# Patient Record
Sex: FEMALE | Race: BLACK OR AFRICAN AMERICAN | NOT HISPANIC OR LATINO | Employment: OTHER | URBAN - METROPOLITAN AREA
[De-identification: names, ages, dates, MRNs, and addresses within clinical notes are randomized per-mention and may not be internally consistent; named-entity substitution may affect disease eponyms.]

---

## 2017-05-02 ENCOUNTER — HISTORICAL (OUTPATIENT)
Dept: RADIOLOGY | Facility: HOSPITAL | Age: 82
End: 2017-05-02

## 2017-05-09 ENCOUNTER — HISTORICAL (OUTPATIENT)
Dept: INTERNAL MEDICINE | Facility: CLINIC | Age: 82
End: 2017-05-09

## 2017-09-11 ENCOUNTER — HISTORICAL (OUTPATIENT)
Dept: INTERNAL MEDICINE | Facility: CLINIC | Age: 82
End: 2017-09-11

## 2017-09-18 ENCOUNTER — HISTORICAL (OUTPATIENT)
Dept: RADIOLOGY | Facility: HOSPITAL | Age: 82
End: 2017-09-18

## 2017-09-29 ENCOUNTER — HISTORICAL (OUTPATIENT)
Dept: RADIOLOGY | Facility: HOSPITAL | Age: 82
End: 2017-09-29

## 2017-10-30 ENCOUNTER — HISTORICAL (OUTPATIENT)
Dept: INTERNAL MEDICINE | Facility: CLINIC | Age: 82
End: 2017-10-30

## 2017-11-10 ENCOUNTER — HISTORICAL (OUTPATIENT)
Dept: INTERNAL MEDICINE | Facility: CLINIC | Age: 82
End: 2017-11-10

## 2017-12-27 ENCOUNTER — HISTORICAL (OUTPATIENT)
Dept: LAB | Facility: HOSPITAL | Age: 82
End: 2017-12-27

## 2018-05-28 ENCOUNTER — HISTORICAL (OUTPATIENT)
Dept: INTERNAL MEDICINE | Facility: CLINIC | Age: 83
End: 2018-05-28

## 2018-07-06 ENCOUNTER — HISTORICAL (OUTPATIENT)
Dept: RADIOLOGY | Facility: HOSPITAL | Age: 83
End: 2018-07-06

## 2018-11-26 ENCOUNTER — HISTORICAL (OUTPATIENT)
Dept: INTERNAL MEDICINE | Facility: CLINIC | Age: 83
End: 2018-11-26

## 2018-12-18 ENCOUNTER — HISTORICAL (OUTPATIENT)
Dept: INTERNAL MEDICINE | Facility: CLINIC | Age: 83
End: 2018-12-18

## 2018-12-19 ENCOUNTER — HISTORICAL (OUTPATIENT)
Dept: LAB | Facility: HOSPITAL | Age: 83
End: 2018-12-19

## 2019-02-11 ENCOUNTER — HISTORICAL (OUTPATIENT)
Dept: ADMINISTRATIVE | Facility: HOSPITAL | Age: 84
End: 2019-02-11

## 2019-05-27 ENCOUNTER — HISTORICAL (OUTPATIENT)
Dept: INTERNAL MEDICINE | Facility: CLINIC | Age: 84
End: 2019-05-27

## 2019-06-07 ENCOUNTER — HISTORICAL (OUTPATIENT)
Dept: INTERNAL MEDICINE | Facility: CLINIC | Age: 84
End: 2019-06-07

## 2019-08-30 ENCOUNTER — HISTORICAL (OUTPATIENT)
Dept: RADIOLOGY | Facility: HOSPITAL | Age: 84
End: 2019-08-30

## 2019-12-02 ENCOUNTER — HISTORICAL (OUTPATIENT)
Dept: RADIOLOGY | Facility: HOSPITAL | Age: 84
End: 2019-12-02

## 2020-01-27 ENCOUNTER — HISTORICAL (OUTPATIENT)
Dept: INTERNAL MEDICINE | Facility: CLINIC | Age: 85
End: 2020-01-27

## 2020-07-29 ENCOUNTER — HISTORICAL (OUTPATIENT)
Dept: INTERNAL MEDICINE | Facility: CLINIC | Age: 85
End: 2020-07-29

## 2020-07-31 LAB — FINAL CULTURE: NO GROWTH

## 2020-08-12 ENCOUNTER — HISTORICAL (OUTPATIENT)
Dept: RADIOLOGY | Facility: HOSPITAL | Age: 85
End: 2020-08-12

## 2020-12-24 ENCOUNTER — HISTORICAL (OUTPATIENT)
Dept: INTERNAL MEDICINE | Facility: CLINIC | Age: 85
End: 2020-12-24

## 2021-01-27 ENCOUNTER — HISTORICAL (OUTPATIENT)
Dept: RADIOLOGY | Facility: HOSPITAL | Age: 86
End: 2021-01-27

## 2021-04-21 ENCOUNTER — HISTORICAL (OUTPATIENT)
Dept: INTERNAL MEDICINE | Facility: CLINIC | Age: 86
End: 2021-04-21

## 2021-05-17 ENCOUNTER — HISTORICAL (OUTPATIENT)
Dept: RADIOLOGY | Facility: HOSPITAL | Age: 86
End: 2021-05-17

## 2021-06-03 ENCOUNTER — HISTORICAL (OUTPATIENT)
Dept: CARDIOLOGY | Facility: HOSPITAL | Age: 86
End: 2021-06-03

## 2021-06-11 ENCOUNTER — HISTORICAL (OUTPATIENT)
Dept: CARDIOLOGY | Facility: HOSPITAL | Age: 86
End: 2021-06-11

## 2021-08-26 ENCOUNTER — HISTORICAL (OUTPATIENT)
Dept: INTERNAL MEDICINE | Facility: CLINIC | Age: 86
End: 2021-08-26

## 2022-01-25 ENCOUNTER — HISTORICAL (OUTPATIENT)
Dept: LAB | Facility: HOSPITAL | Age: 87
End: 2022-01-25

## 2022-04-09 ENCOUNTER — HISTORICAL (OUTPATIENT)
Dept: ADMINISTRATIVE | Facility: HOSPITAL | Age: 87
End: 2022-04-09
Payer: MEDICARE

## 2022-04-26 VITALS
DIASTOLIC BLOOD PRESSURE: 70 MMHG | BODY MASS INDEX: 34.36 KG/M2 | SYSTOLIC BLOOD PRESSURE: 160 MMHG | OXYGEN SATURATION: 99 % | HEIGHT: 64 IN | WEIGHT: 201.25 LBS

## 2022-05-06 ENCOUNTER — LAB VISIT (OUTPATIENT)
Dept: LAB | Facility: HOSPITAL | Age: 87
End: 2022-05-06
Attending: NURSE PRACTITIONER
Payer: MEDICARE

## 2022-05-06 DIAGNOSIS — E78.5 HYPERLIPIDEMIA, UNSPECIFIED HYPERLIPIDEMIA TYPE: ICD-10-CM

## 2022-05-06 DIAGNOSIS — C82.03 FOLLICULAR LYMPHOMA GRADE I OF INTRA-ABDOMINAL LYMPH NODES: ICD-10-CM

## 2022-05-06 DIAGNOSIS — C61 MALIGNANT NEOPLASM OF PROSTATE: ICD-10-CM

## 2022-05-06 DIAGNOSIS — I48.91 ATRIAL FIBRILLATION: ICD-10-CM

## 2022-05-06 DIAGNOSIS — C83.31 RETICULOSARCOMA OF LYMPH NODES OF HEAD, FACE, AND NECK: Primary | ICD-10-CM

## 2022-05-06 LAB
ALBUMIN SERPL-MCNC: 3.6 GM/DL (ref 3.4–4.8)
ALBUMIN/GLOB SERPL: 1 RATIO (ref 1.1–2)
ALP SERPL-CCNC: 63 UNIT/L (ref 40–150)
ALT SERPL-CCNC: 11 UNIT/L (ref 0–55)
APPEARANCE UR: CLEAR
AST SERPL-CCNC: 18 UNIT/L (ref 5–34)
BACTERIA #/AREA URNS AUTO: ABNORMAL /HPF
BASOPHILS # BLD AUTO: 0.05 X10(3)/MCL (ref 0–0.2)
BASOPHILS NFR BLD AUTO: 0.7 %
BILIRUB UR QL STRIP.AUTO: NEGATIVE MG/DL
BILIRUBIN DIRECT+TOT PNL SERPL-MCNC: 0.4 MG/DL (ref 0–0.5)
BILIRUBIN DIRECT+TOT PNL SERPL-MCNC: 0.5 MG/DL (ref 0–0.8)
BILIRUBIN DIRECT+TOT PNL SERPL-MCNC: 0.9 MG/DL
BUN SERPL-MCNC: 20.2 MG/DL (ref 9.8–20.1)
CALCIUM SERPL-MCNC: 9.6 MG/DL (ref 8.4–10.2)
CHLORIDE SERPL-SCNC: 105 MMOL/L (ref 98–107)
CHOLEST SERPL-MCNC: 159 MG/DL
CHOLEST/HDLC SERPL: 3 {RATIO} (ref 0–5)
CO2 SERPL-SCNC: 29 MMOL/L (ref 23–31)
COLOR UR AUTO: YELLOW
CREAT SERPL-MCNC: 0.81 MG/DL (ref 0.55–1.02)
DEPRECATED CALCIDIOL+CALCIFEROL SERPL-MC: 39.5 NG/ML (ref 30–80)
EOSINOPHIL # BLD AUTO: 0.77 X10(3)/MCL (ref 0–0.9)
EOSINOPHIL NFR BLD AUTO: 11.1 %
ERYTHROCYTE [DISTWIDTH] IN BLOOD BY AUTOMATED COUNT: 13.5 % (ref 11.5–17)
GLOBULIN SER-MCNC: 3.6 GM/DL (ref 2.4–3.5)
GLUCOSE SERPL-MCNC: 102 MG/DL (ref 82–115)
GLUCOSE UR QL STRIP.AUTO: NEGATIVE MG/DL
HCT VFR BLD AUTO: 36.6 % (ref 37–47)
HDLC SERPL-MCNC: 46 MG/DL (ref 35–60)
HGB BLD-MCNC: 11.2 GM/DL (ref 12–16)
IMM GRANULOCYTES # BLD AUTO: 0.01 X10(3)/MCL (ref 0–0.02)
IMM GRANULOCYTES NFR BLD AUTO: 0.1 % (ref 0–0.43)
KETONES UR QL STRIP.AUTO: NEGATIVE MG/DL
LDLC SERPL CALC-MCNC: 90 MG/DL (ref 50–140)
LEUKOCYTE ESTERASE UR QL STRIP.AUTO: NEGATIVE UNIT/L
LYMPHOCYTES # BLD AUTO: 2.26 X10(3)/MCL (ref 0.6–4.6)
LYMPHOCYTES NFR BLD AUTO: 32.7 %
MCH RBC QN AUTO: 29.6 PG (ref 27–31)
MCHC RBC AUTO-ENTMCNC: 30.6 MG/DL (ref 33–36)
MCV RBC AUTO: 96.8 FL (ref 80–94)
MONOCYTES # BLD AUTO: 0.58 X10(3)/MCL (ref 0.1–1.3)
MONOCYTES NFR BLD AUTO: 8.4 %
NEUTROPHILS # BLD AUTO: 3.2 X10(3)/MCL (ref 2.1–9.2)
NEUTROPHILS NFR BLD AUTO: 47 %
NITRITE UR QL STRIP.AUTO: NEGATIVE
PH UR STRIP.AUTO: 7 [PH]
PLATELET # BLD AUTO: 322 X10(3)/MCL (ref 130–400)
PMV BLD AUTO: 9.7 FL (ref 9.4–12.4)
POTASSIUM SERPL-SCNC: 3.6 MMOL/L (ref 3.5–5.1)
PROT SERPL-MCNC: 7.2 GM/DL (ref 5.8–7.6)
PROT UR QL STRIP.AUTO: NEGATIVE MG/DL
RBC # BLD AUTO: 3.78 X10(6)/MCL (ref 4.2–5.4)
RBC #/AREA URNS AUTO: ABNORMAL /HPF
RBC UR QL AUTO: ABNORMAL UNIT/L
SODIUM SERPL-SCNC: 144 MMOL/L (ref 136–145)
SP GR UR STRIP.AUTO: 1.02
SQUAMOUS #/AREA URNS AUTO: ABNORMAL /LPF
TRIGL SERPL-MCNC: 114 MG/DL (ref 37–140)
UROBILINOGEN UR STRIP-ACNC: 0.2 MG/DL
VLDLC SERPL CALC-MCNC: 23 MG/DL
WBC # SPEC AUTO: 6.9 X10(3)/MCL (ref 4.5–11.5)
WBC #/AREA URNS AUTO: ABNORMAL /HPF

## 2022-05-06 PROCEDURE — 81003 URINALYSIS AUTO W/O SCOPE: CPT

## 2022-05-06 PROCEDURE — 80061 LIPID PANEL: CPT

## 2022-05-06 PROCEDURE — 85025 COMPLETE CBC W/AUTO DIFF WBC: CPT

## 2022-05-06 PROCEDURE — 81015 MICROSCOPIC EXAM OF URINE: CPT

## 2022-05-06 PROCEDURE — 36415 COLL VENOUS BLD VENIPUNCTURE: CPT

## 2022-05-06 PROCEDURE — 80053 COMPREHEN METABOLIC PANEL: CPT

## 2022-05-06 PROCEDURE — 82306 VITAMIN D 25 HYDROXY: CPT

## 2022-05-27 ENCOUNTER — OFFICE VISIT (OUTPATIENT)
Dept: INTERNAL MEDICINE | Facility: CLINIC | Age: 87
End: 2022-05-27
Payer: MEDICARE

## 2022-05-27 VITALS
DIASTOLIC BLOOD PRESSURE: 60 MMHG | SYSTOLIC BLOOD PRESSURE: 142 MMHG | RESPIRATION RATE: 16 BRPM | HEART RATE: 50 BPM | TEMPERATURE: 98 F | WEIGHT: 204.19 LBS | BODY MASS INDEX: 35.29 KG/M2

## 2022-05-27 DIAGNOSIS — I10 HYPERTENSION, UNSPECIFIED TYPE: ICD-10-CM

## 2022-05-27 DIAGNOSIS — E66.9 OBESITY, UNSPECIFIED CLASSIFICATION, UNSPECIFIED OBESITY TYPE, UNSPECIFIED WHETHER SERIOUS COMORBIDITY PRESENT: ICD-10-CM

## 2022-05-27 DIAGNOSIS — E55.9 VITAMIN D DEFICIENCY: ICD-10-CM

## 2022-05-27 DIAGNOSIS — E78.5 HYPERLIPIDEMIA, UNSPECIFIED HYPERLIPIDEMIA TYPE: ICD-10-CM

## 2022-05-27 DIAGNOSIS — N28.89 RENAL MASS, LEFT: ICD-10-CM

## 2022-05-27 PROCEDURE — 3288F PR FALLS RISK ASSESSMENT DOCUMENTED: ICD-10-PCS | Mod: CPTII,,, | Performed by: NURSE PRACTITIONER

## 2022-05-27 PROCEDURE — 1159F PR MEDICATION LIST DOCUMENTED IN MEDICAL RECORD: ICD-10-PCS | Mod: CPTII,,, | Performed by: NURSE PRACTITIONER

## 2022-05-27 PROCEDURE — 3077F SYST BP >= 140 MM HG: CPT | Mod: CPTII,,, | Performed by: NURSE PRACTITIONER

## 2022-05-27 PROCEDURE — 1126F AMNT PAIN NOTED NONE PRSNT: CPT | Mod: CPTII,,, | Performed by: NURSE PRACTITIONER

## 2022-05-27 PROCEDURE — 1159F MED LIST DOCD IN RCRD: CPT | Mod: CPTII,,, | Performed by: NURSE PRACTITIONER

## 2022-05-27 PROCEDURE — 1126F PR PAIN SEVERITY QUANTIFIED, NO PAIN PRESENT: ICD-10-PCS | Mod: CPTII,,, | Performed by: NURSE PRACTITIONER

## 2022-05-27 PROCEDURE — 3288F FALL RISK ASSESSMENT DOCD: CPT | Mod: CPTII,,, | Performed by: NURSE PRACTITIONER

## 2022-05-27 PROCEDURE — 1101F PT FALLS ASSESS-DOCD LE1/YR: CPT | Mod: CPTII,,, | Performed by: NURSE PRACTITIONER

## 2022-05-27 PROCEDURE — 3078F PR MOST RECENT DIASTOLIC BLOOD PRESSURE < 80 MM HG: ICD-10-PCS | Mod: CPTII,,, | Performed by: NURSE PRACTITIONER

## 2022-05-27 PROCEDURE — 99214 OFFICE O/P EST MOD 30 MIN: CPT | Mod: S$PBB,,, | Performed by: NURSE PRACTITIONER

## 2022-05-27 PROCEDURE — 99214 OFFICE O/P EST MOD 30 MIN: CPT | Mod: PBBFAC | Performed by: NURSE PRACTITIONER

## 2022-05-27 PROCEDURE — 1160F RVW MEDS BY RX/DR IN RCRD: CPT | Mod: CPTII,,, | Performed by: NURSE PRACTITIONER

## 2022-05-27 PROCEDURE — 3077F PR MOST RECENT SYSTOLIC BLOOD PRESSURE >= 140 MM HG: ICD-10-PCS | Mod: CPTII,,, | Performed by: NURSE PRACTITIONER

## 2022-05-27 PROCEDURE — 1160F PR REVIEW ALL MEDS BY PRESCRIBER/CLIN PHARMACIST DOCUMENTED: ICD-10-PCS | Mod: CPTII,,, | Performed by: NURSE PRACTITIONER

## 2022-05-27 PROCEDURE — 99214 PR OFFICE/OUTPT VISIT, EST, LEVL IV, 30-39 MIN: ICD-10-PCS | Mod: S$PBB,,, | Performed by: NURSE PRACTITIONER

## 2022-05-27 PROCEDURE — 3078F DIAST BP <80 MM HG: CPT | Mod: CPTII,,, | Performed by: NURSE PRACTITIONER

## 2022-05-27 PROCEDURE — 1101F PR PT FALLS ASSESS DOC 0-1 FALLS W/OUT INJ PAST YR: ICD-10-PCS | Mod: CPTII,,, | Performed by: NURSE PRACTITIONER

## 2022-05-27 RX ORDER — AMLODIPINE BESYLATE 10 MG/1
10 TABLET ORAL
COMMUNITY
Start: 2022-01-27 | End: 2022-05-27 | Stop reason: SDUPTHER

## 2022-05-27 RX ORDER — FAMOTIDINE 20 MG/1
20 TABLET, FILM COATED ORAL
COMMUNITY
Start: 2021-09-22 | End: 2022-05-27 | Stop reason: SDUPTHER

## 2022-05-27 RX ORDER — DICLOFENAC SODIUM 50 MG/1
50 TABLET, DELAYED RELEASE ORAL 2 TIMES DAILY PRN
Qty: 45 TABLET | Refills: 0 | Status: SHIPPED | OUTPATIENT
Start: 2022-05-27 | End: 2023-01-23 | Stop reason: SDUPTHER

## 2022-05-27 RX ORDER — FAMOTIDINE 20 MG/1
20 TABLET, FILM COATED ORAL NIGHTLY PRN
Qty: 30 TABLET | Refills: 2 | Status: SHIPPED | OUTPATIENT
Start: 2022-05-27 | End: 2022-09-22 | Stop reason: SDUPTHER

## 2022-05-27 RX ORDER — SIMVASTATIN 40 MG/1
TABLET, FILM COATED ORAL
COMMUNITY
Start: 2022-05-02 | End: 2022-05-27 | Stop reason: SDUPTHER

## 2022-05-27 RX ORDER — AMLODIPINE BESYLATE 10 MG/1
10 TABLET ORAL DAILY
Qty: 90 TABLET | Refills: 1 | Status: SHIPPED | OUTPATIENT
Start: 2022-05-27 | End: 2023-01-23 | Stop reason: SDUPTHER

## 2022-05-27 RX ORDER — TRIAMTERENE AND HYDROCHLOROTHIAZIDE 37.5; 25 MG/1; MG/1
CAPSULE ORAL
COMMUNITY
Start: 2022-01-27 | End: 2022-05-27 | Stop reason: SDUPTHER

## 2022-05-27 RX ORDER — SIMVASTATIN 40 MG/1
40 TABLET, FILM COATED ORAL NIGHTLY
Qty: 90 TABLET | Refills: 1 | Status: SHIPPED | OUTPATIENT
Start: 2022-05-27 | End: 2022-09-22 | Stop reason: SDUPTHER

## 2022-05-27 RX ORDER — BENAZEPRIL HYDROCHLORIDE 40 MG/1
40 TABLET ORAL
COMMUNITY
Start: 2022-01-27 | End: 2022-05-27 | Stop reason: SDUPTHER

## 2022-05-27 RX ORDER — TRIAMTERENE AND HYDROCHLOROTHIAZIDE 37.5; 25 MG/1; MG/1
1 CAPSULE ORAL DAILY
Qty: 90 CAPSULE | Refills: 1 | Status: SHIPPED | OUTPATIENT
Start: 2022-05-27 | End: 2022-09-22 | Stop reason: SDUPTHER

## 2022-05-27 RX ORDER — BENAZEPRIL HYDROCHLORIDE 40 MG/1
40 TABLET ORAL DAILY
Qty: 90 TABLET | Refills: 1 | Status: SHIPPED | OUTPATIENT
Start: 2022-05-27 | End: 2022-09-22 | Stop reason: SDUPTHER

## 2022-05-27 NOTE — PROGRESS NOTES
Patient, Leda Gallardo (MRN #[id number]), presented with a recorded BMI of 35.29 kg/m^2 and a documented comorbidity(s):  - Hypertension  - Hyperlipidemia  to which the severe obesity is a contributing factor. This is consistent with the definition of severe obesity (BMI 35.0-39.9) with comorbidity (ICD-10 E66.01, Z68.35). The patient's severe obesity was monitored, evaluated, addressed and/or treated. This addendum to the medical record is made on 05/27/2022.

## 2022-05-27 NOTE — PROGRESS NOTES
[facility] Medicine Clinic  [facility]     Patient Name: Leda Gallardo   : 1935  MRN:[id number]     CC:  Chief Complaint   Patient presents with    Follow-up     Lab results        HPI  86 year old AAF, presents in clinic for routine lab f/u. No acute complaints.   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 Nonsmoker. On diclofenac prn for chronic david foot/toe /ankle pain. Not taking diclofenac currently. Pain to left great toe only with walking. Denies chest pain, shortness of breath at rest, cough, fever, headache, dizziness, weakness, abdominal pain, nausea, vomiting, diarrhea, constipation, edema, dysuria, depression, anxiety.  Pneumo vac UTD.  2nd COVD 19 vaccine completed 2021  She was referred to UROLOGY at [facility] for bosniak IIF or III lesion in L superior pole.  Asymptomatic. The Urologist recommended refer to [facility] for surgical eval.  She continues to refuse urology referral to [facility].  Surveillance with CT/MRI regardless every 6 months. CT ordered at last apt and was never scheduled. Will reorder; note there is shortage of contrast.  Family history of primary malignant neoplasm of breast: Mother and Sister  MMG 2019 BIRADS 1; scheduled 2021;no showed and declines MMG in future    ECHO  EF 65%  ECHO  EF 60-65%  2021:coronary angiogram  Coronary stenosis:  discussed the option of CABG versus high risk PCI versus medical management.  Started on Imdur 20 mg p.o. daily; no longer taking. Missed f/u cardio apt and advised to reschedule.      Mild aortic stenosis/mild aortic regurgitation per TTE 8.12.20  LVEF -60-65% (TTE 8..20)    Abnormal nuclear stress test  Chest pain/DONAHUE  Lexiscan stress test -Apical defect, medium size, moderate intensity, partially reversible (1.27.21)  LVEF-60-65%  (TTE 8..20)    CT abd with contrast 2021;Enhancing mass again seen involving the posterior aspect of the  superior pole of  the left kidney. While this could represent a  neoplasm, it has not changed in size or appearance when compared to  the exam from 08/30/2019.            ROS  Review of Systems   Constitutional: Negative.    HENT: Negative.    Eyes: Negative.    Respiratory: Negative.    Cardiovascular: Negative.    Gastrointestinal: Negative.    Endocrine: Negative.    Genitourinary: Negative.    Musculoskeletal: Negative.    Integumentary:  Negative.   Allergic/Immunologic: Negative.    Neurological: Negative.    Hematological: Negative.    Psychiatric/Behavioral: Negative.    All other systems reviewed and are negative.       Physical Examination:  Vitals:    05/27/22 0930   BP: (!) 142/60   Pulse: (!) 50   Resp: 16   Temp: 97.5 °F (36.4 °C)          Physical Exam  Vitals and nursing note reviewed.   Constitutional:       Appearance: Normal appearance.   HENT:      Head: Normocephalic and atraumatic.      Right Ear: Tympanic membrane, ear canal and external ear normal.      Left Ear: Tympanic membrane, ear canal and external ear normal.      Nose: Nose normal.      Mouth/Throat:      Mouth: Mucous membranes are moist.      Pharynx: Oropharynx is clear.   Eyes:      Extraocular Movements: Extraocular movements intact.      Conjunctiva/sclera: Conjunctivae normal.      Pupils: Pupils are equal, round, and reactive to light.   Cardiovascular:      Rate and Rhythm: Normal rate and regular rhythm.   Pulmonary:      Effort: Pulmonary effort is normal.      Breath sounds: Normal breath sounds.   Abdominal:      General: Abdomen is flat. Bowel sounds are normal.      Palpations: Abdomen is soft.   Musculoskeletal:         General: Normal range of motion.      Cervical back: Normal range of motion and neck supple.   Skin:     General: Skin is warm and dry.      Capillary Refill: Capillary refill takes less than 2 seconds.   Neurological:      General: No focal deficit present.      Mental Status: She is alert and oriented to person, place,  and time. Mental status is at baseline.   Psychiatric:         Mood and Affect: Mood normal.         Behavior: Behavior normal.         Thought Content: Thought content normal.         Judgment: Judgment normal.            Labs/Imaging:  Reviewed    Assessment/Plan:  1. Renal mass, left  - Ambulatory referral/consult to Urology; Future  - Urinalysis, Reflex to Urine Culture Urine, Clean Catch; Future  Re-refer to UROLOGY at [facility]. Refusing URO referral to [facility].  F/u CT abd was ordered at prior apt and not scheduled, will have staff check with scheduling.  Asymptomatic    2. Hyperlipidemia, unspecified hyperlipidemia type  - CBC Auto Differential; Future  - Comprehensive Metabolic Panel; Future  - Lipid Panel; Future  - Urinalysis, Reflex to Urine Culture Urine, Clean Catch; Future  Lab Results   Component Value Date    LDL 90.00 05/06/2022    HDL 46 05/06/2022    TRIG 114 05/06/2022       Cont RX daily; refills given. Take Omega 3 daily.   Stressed importance of dietary modifications. Follow a low cholesterol, low saturated fat diet with less that 200mg of cholesterol a day.  Avoid fried foods and high saturated fats (high saturated fats less than 7% of calories).  Add Flax Seed/Fish Oil supplements to diet. Increase dietary fiber.  Regular exercise can reduce LDL and raise HDL. Stressed importance of physical activity 5 times per week for 30 minutes per day.     3. Hypertension, unspecified type  - CBC Auto Differential; Future  - Comprehensive Metabolic Panel; Future  - Lipid Panel; Future  - Urinalysis, Reflex to Urine Culture Urine, Clean Catch; Future  Meds continued.  DASH diet: Eat more fruits, vegetables, and low fat dairy foods.  (Less than 2 grams of sodium per day).  Maintain healthy weight with goal BMI <30.   Exercise 30 minutes per day 5 days per week.  Home medications refilled and continued.   Home BP monitoring encouraged with BP parameters given.       4. Obesity, unspecified classification,  unspecified obesity type, unspecified whether serious comorbidity present  Goal BMI <30.  Exercise 5 times a week for 30 minutes per day.  Avoid soda, simple sugars, excessive rice, potatoes or bread. Limit fast foods and fried foods.  Choose complex carbs in moderation (example: green vegetables, beans, oatmeal). Eat plenty of fresh fruits and vegetables with lean meats daily.  Do not skip meals. Eat a balanced portion size.  Avoid fad diets. Consider permanent healthy life style changes.     5. Vitamin D deficiency  - Vitamin D; Future  Vit D at goal, continue otc vit d    Medication List with Changes/Refills   Changed and/or Refilled Medications    Modified Medication Previous Medication    AMLODIPINE (NORVASC) 10 MG TABLET amLODIPine (NORVASC) 10 MG tablet       Take 1 tablet (10 mg total) by mouth once daily.    Take 10 mg by mouth.    BENAZEPRIL (LOTENSIN) 40 MG TABLET benazepriL (LOTENSIN) 40 MG tablet       Take 1 tablet (40 mg total) by mouth once daily.    Take 40 mg by mouth.    DICLOFENAC (VOLTAREN) 50 MG EC TABLET diclofenac (VOLTAREN) 50 MG EC tablet       Take 1 tablet (50 mg total) by mouth 2 (two) times daily as needed (pain).    TAKE 1 TABLET BY MOUTH TWICE DAILY AS NEEDED FOR PAIN WITH FOOD    FAMOTIDINE (PEPCID) 20 MG TABLET famotidine (PEPCID) 20 MG tablet       Take 1 tablet (20 mg total) by mouth nightly as needed for Heartburn.    Take 20 mg by mouth.    SIMVASTATIN (ZOCOR) 40 MG TABLET simvastatin (ZOCOR) 40 MG tablet       Take 1 tablet (40 mg total) by mouth every evening.    TAKE 1 TABLET BY MOUTH ONCE DAILY AT BEDTIME FOR 90 DAYS    TRIAMTERENE-HYDROCHLOROTHIAZIDE 37.5-25 MG (DYAZIDE) 37.5-25 MG PER CAPSULE triamterene-hydrochlorothiazide 37.5-25 mg (DYAZIDE) 37.5-25 mg per capsule       Take 1 capsule by mouth once daily.    Take by mouth.        Labs thoroughly reviewed with patient. Medication refills addressed today.  RTC prn and 3-4 months, with labs 1 week prior to the apt.  COVID  19 precautions given to patient.  Patient voices understanding of all discharge instructions.

## 2022-07-01 ENCOUNTER — HOSPITAL ENCOUNTER (EMERGENCY)
Facility: HOSPITAL | Age: 87
Discharge: HOME | End: 2022-07-01
Attending: STUDENT IN AN ORGANIZED HEALTH CARE EDUCATION/TRAINING PROGRAM
Payer: MEDICARE

## 2022-07-01 VITALS
HEIGHT: 64 IN | TEMPERATURE: 98 F | WEIGHT: 203 LBS | OXYGEN SATURATION: 95 % | SYSTOLIC BLOOD PRESSURE: 117 MMHG | BODY MASS INDEX: 34.66 KG/M2 | RESPIRATION RATE: 18 BRPM | HEART RATE: 87 BPM | DIASTOLIC BLOOD PRESSURE: 63 MMHG

## 2022-07-01 DIAGNOSIS — B34.9 VIRAL SYNDROME: Primary | ICD-10-CM

## 2022-07-01 DIAGNOSIS — R50.9 FEVER: ICD-10-CM

## 2022-07-01 LAB
ALBUMIN SERPL-MCNC: 3.5 GM/DL (ref 3.4–4.8)
ALBUMIN/GLOB SERPL: 1.2 RATIO (ref 1.1–2)
ALP SERPL-CCNC: 72 UNIT/L (ref 40–150)
ALT SERPL-CCNC: 21 UNIT/L (ref 0–55)
APPEARANCE UR: ABNORMAL
AST SERPL-CCNC: 36 UNIT/L (ref 5–34)
BACTERIA #/AREA URNS AUTO: ABNORMAL /HPF
BASOPHILS # BLD AUTO: 0.03 X10(3)/MCL (ref 0–0.2)
BASOPHILS NFR BLD AUTO: 0.3 %
BILIRUB UR QL STRIP.AUTO: NEGATIVE MG/DL
BILIRUBIN DIRECT+TOT PNL SERPL-MCNC: 1.3 MG/DL
BUN SERPL-MCNC: 24.2 MG/DL (ref 9.8–20.1)
CALCIUM SERPL-MCNC: 9 MG/DL (ref 8.4–10.2)
CHLORIDE SERPL-SCNC: 105 MMOL/L (ref 98–107)
CO2 SERPL-SCNC: 26 MMOL/L (ref 23–31)
COLOR UR AUTO: YELLOW
CREAT SERPL-MCNC: 0.81 MG/DL (ref 0.55–1.02)
EOSINOPHIL # BLD AUTO: 0.17 X10(3)/MCL (ref 0–0.9)
EOSINOPHIL NFR BLD AUTO: 1.9 %
ERYTHROCYTE [DISTWIDTH] IN BLOOD BY AUTOMATED COUNT: 13.4 % (ref 11.5–17)
FLUAV AG UPPER RESP QL IA.RAPID: NOT DETECTED
FLUBV AG UPPER RESP QL IA.RAPID: NOT DETECTED
GLOBULIN SER-MCNC: 2.9 GM/DL (ref 2.4–3.5)
GLUCOSE SERPL-MCNC: 122 MG/DL (ref 82–115)
GLUCOSE UR QL STRIP.AUTO: NEGATIVE MG/DL
HCT VFR BLD AUTO: 34.4 % (ref 37–47)
HGB BLD-MCNC: 10.7 GM/DL (ref 12–16)
IMM GRANULOCYTES # BLD AUTO: 0.02 X10(3)/MCL (ref 0–0.04)
IMM GRANULOCYTES NFR BLD AUTO: 0.2 %
KETONES UR QL STRIP.AUTO: NEGATIVE MG/DL
LACTATE SERPL-SCNC: 2.1 MMOL/L (ref 0.5–2.2)
LEUKOCYTE ESTERASE UR QL STRIP.AUTO: NEGATIVE UNIT/L
LYMPHOCYTES # BLD AUTO: 0.47 X10(3)/MCL (ref 0.6–4.6)
LYMPHOCYTES NFR BLD AUTO: 5.4 %
MCH RBC QN AUTO: 29.5 PG (ref 27–31)
MCHC RBC AUTO-ENTMCNC: 31.1 MG/DL (ref 33–36)
MCV RBC AUTO: 94.8 FL (ref 80–94)
MONOCYTES # BLD AUTO: 0.17 X10(3)/MCL (ref 0.1–1.3)
MONOCYTES NFR BLD AUTO: 1.9 %
NEUTROPHILS # BLD AUTO: 7.9 X10(3)/MCL (ref 2.1–9.2)
NEUTROPHILS NFR BLD AUTO: 90.3 %
NITRITE UR QL STRIP.AUTO: NEGATIVE
PH UR STRIP.AUTO: 7 [PH]
PLATELET # BLD AUTO: 270 X10(3)/MCL (ref 130–400)
PMV BLD AUTO: 9.9 FL (ref 7.4–10.4)
POC CARDIAC TROPONIN I: 0 NG/ML
POCT GLUCOSE: 128 MG/DL (ref 70–110)
POTASSIUM SERPL-SCNC: 3.7 MMOL/L (ref 3.5–5.1)
PROT SERPL-MCNC: 6.4 GM/DL (ref 5.8–7.6)
PROT UR QL STRIP.AUTO: NEGATIVE MG/DL
RBC # BLD AUTO: 3.63 X10(6)/MCL (ref 4.2–5.4)
RBC #/AREA URNS AUTO: ABNORMAL /HPF
RBC UR QL AUTO: ABNORMAL UNIT/L
SAMPLE: NORMAL
SARS-COV-2 RNA RESP QL NAA+PROBE: NOT DETECTED
SODIUM SERPL-SCNC: 144 MMOL/L (ref 136–145)
SP GR UR STRIP.AUTO: 1.02
SQUAMOUS #/AREA URNS AUTO: ABNORMAL /HPF
UROBILINOGEN UR STRIP-ACNC: 1 MG/DL
WBC # SPEC AUTO: 8.8 X10(3)/MCL (ref 4.5–11.5)
WBC #/AREA URNS AUTO: ABNORMAL /HPF

## 2022-07-01 PROCEDURE — 87636 SARSCOV2 & INF A&B AMP PRB: CPT | Performed by: STUDENT IN AN ORGANIZED HEALTH CARE EDUCATION/TRAINING PROGRAM

## 2022-07-01 PROCEDURE — 81001 URINALYSIS AUTO W/SCOPE: CPT | Performed by: STUDENT IN AN ORGANIZED HEALTH CARE EDUCATION/TRAINING PROGRAM

## 2022-07-01 PROCEDURE — 99285 EMERGENCY DEPT VISIT HI MDM: CPT | Mod: 25

## 2022-07-01 PROCEDURE — 96360 HYDRATION IV INFUSION INIT: CPT

## 2022-07-01 PROCEDURE — 83605 ASSAY OF LACTIC ACID: CPT | Performed by: STUDENT IN AN ORGANIZED HEALTH CARE EDUCATION/TRAINING PROGRAM

## 2022-07-01 PROCEDURE — 80053 COMPREHEN METABOLIC PANEL: CPT | Performed by: STUDENT IN AN ORGANIZED HEALTH CARE EDUCATION/TRAINING PROGRAM

## 2022-07-01 PROCEDURE — 25000003 PHARM REV CODE 250: Performed by: STUDENT IN AN ORGANIZED HEALTH CARE EDUCATION/TRAINING PROGRAM

## 2022-07-01 PROCEDURE — 87184 SC STD DISK METHOD PER PLATE: CPT | Performed by: STUDENT IN AN ORGANIZED HEALTH CARE EDUCATION/TRAINING PROGRAM

## 2022-07-01 PROCEDURE — 81003 URINALYSIS AUTO W/O SCOPE: CPT | Performed by: STUDENT IN AN ORGANIZED HEALTH CARE EDUCATION/TRAINING PROGRAM

## 2022-07-01 PROCEDURE — 87077 CULTURE AEROBIC IDENTIFY: CPT | Performed by: STUDENT IN AN ORGANIZED HEALTH CARE EDUCATION/TRAINING PROGRAM

## 2022-07-01 PROCEDURE — 85025 COMPLETE CBC W/AUTO DIFF WBC: CPT | Performed by: STUDENT IN AN ORGANIZED HEALTH CARE EDUCATION/TRAINING PROGRAM

## 2022-07-01 PROCEDURE — 36415 COLL VENOUS BLD VENIPUNCTURE: CPT | Performed by: STUDENT IN AN ORGANIZED HEALTH CARE EDUCATION/TRAINING PROGRAM

## 2022-07-01 RX ORDER — LIDOCAINE HYDROCHLORIDE 20 MG/ML
10 SOLUTION OROPHARYNGEAL ONCE
Status: COMPLETED | OUTPATIENT
Start: 2022-07-01 | End: 2022-07-01

## 2022-07-01 RX ORDER — SODIUM CHLORIDE 9 MG/ML
1000 INJECTION, SOLUTION INTRAVENOUS
Status: COMPLETED | OUTPATIENT
Start: 2022-07-01 | End: 2022-07-01

## 2022-07-01 RX ORDER — MAG HYDROX/ALUMINUM HYD/SIMETH 200-200-20
30 SUSPENSION, ORAL (FINAL DOSE FORM) ORAL ONCE
Status: COMPLETED | OUTPATIENT
Start: 2022-07-01 | End: 2022-07-01

## 2022-07-01 RX ORDER — ACETAMINOPHEN 500 MG
1000 TABLET ORAL
Status: COMPLETED | OUTPATIENT
Start: 2022-07-01 | End: 2022-07-01

## 2022-07-01 RX ADMIN — ALUMINUM HYDROXIDE, MAGNESIUM HYDROXIDE, AND SIMETHICONE 30 ML: 200; 200; 20 SUSPENSION ORAL at 03:07

## 2022-07-01 RX ADMIN — SODIUM CHLORIDE 1000 ML: 9 INJECTION, SOLUTION INTRAVENOUS at 02:07

## 2022-07-01 RX ADMIN — ACETAMINOPHEN 1000 MG: 500 TABLET, FILM COATED ORAL at 12:07

## 2022-07-01 RX ADMIN — LIDOCAINE HYDROCHLORIDE 10 ML: 20 SOLUTION ORAL; TOPICAL at 04:07

## 2022-07-01 NOTE — DISCHARGE INSTRUCTIONS
Continue to monitor fevers at home and for any new or worsening symptoms  FU with PCP  Er precautions given

## 2022-07-01 NOTE — ED NOTES
Pt c/o shaking and chills that started this morning. Daughter at bed side states pt was sob upon arrival to her house.  Pt denies CP, or sob. No distress noted. Pt AAO x 3.

## 2022-07-01 NOTE — ED PROVIDER NOTES
Encounter Date: 7/1/2022       History     Chief Complaint   Patient presents with    Chills     Reports she started with chills this morning around 8:00 am. Denies pain or any other symptoms     87-year-old female presents to ED after EMS was called when family noted that she was having chills.  Patient's daughter states that she was shaking a lot this morning however temperature without taking.  Denies any other issues or complaints.  Denies any fevers, chills, nausea, vomiting, abdominal pain, headaches, cough, sinus condition.  Patient is very active for age cares for 2 small children who live with her.  Live with numerous other family members however denies any known sick contacts.    Patients daughter states she has chronic issues with constiaption however took some mag citrate yesterday and had soft BM this morning        Review of patient's allergies indicates:  No Known Allergies  No past medical history on file.  Past Surgical History:   Procedure Laterality Date    HYSTERECTOMY      LEFT HEART CATHETERIZATION  06/11/2021     Family History   Problem Relation Age of Onset    Breast cancer Mother     Breast cancer Sister      Social History     Tobacco Use    Smoking status: Never Smoker    Smokeless tobacco: Never Used     Review of Systems   Constitutional: Positive for chills. Negative for fever.   HENT: Negative for sore throat.    Respiratory: Negative for shortness of breath.    Cardiovascular: Negative for chest pain.   Gastrointestinal: Negative for nausea.   Genitourinary: Negative for dysuria.   Musculoskeletal: Negative for back pain.   Skin: Negative for rash.   Neurological: Negative for weakness.   Hematological: Does not bruise/bleed easily.       Physical Exam     Initial Vitals [07/01/22 1127]   BP Pulse Resp Temp SpO2   (!) 177/75 93 16 (!) 100.5 °F (38.1 °C) 98 %      MAP       --         Physical Exam    Nursing note and vitals reviewed.  Constitutional: She appears well-developed  and well-nourished. No distress.   In good spirits, laughing, talkative   HENT:   Head: Normocephalic and atraumatic.   Eyes: EOM are normal. Pupils are equal, round, and reactive to light.   Neck: Neck supple.   Normal range of motion.  Cardiovascular: Normal rate, regular rhythm, normal heart sounds and intact distal pulses.   Pulmonary/Chest: Breath sounds normal. No respiratory distress. She has no wheezes.   Abdominal: Abdomen is soft. Bowel sounds are normal. There is no abdominal tenderness. There is no rebound and no guarding.   Musculoskeletal:         General: No tenderness or edema. Normal range of motion.      Cervical back: Normal range of motion and neck supple.     Neurological: She is alert and oriented to person, place, and time. She has normal strength.   Skin: Skin is warm and dry. No erythema.   Psychiatric: She has a normal mood and affect. Her behavior is normal. Judgment and thought content normal.         ED Course   Procedures  Labs Reviewed   URINALYSIS, REFLEX TO URINE CULTURE - Abnormal; Notable for the following components:       Result Value    Appearance, UA Slightly Cloudy (*)     Blood, UA Small (*)     All other components within normal limits   COMPREHENSIVE METABOLIC PANEL - Abnormal; Notable for the following components:    Glucose Level 122 (*)     Blood Urea Nitrogen 24.2 (*)     Aspartate Aminotransferase 36 (*)     All other components within normal limits   CBC WITH DIFFERENTIAL - Abnormal; Notable for the following components:    RBC 3.63 (*)     Hgb 10.7 (*)     Hct 34.4 (*)     MCV 94.8 (*)     MCHC 31.1 (*)     Lymph # 0.47 (*)     All other components within normal limits   URINALYSIS, MICROSCOPIC - Abnormal; Notable for the following components:    Squamous Epithelial Cells, UA Many (*)     All other components within normal limits   POCT GLUCOSE - Abnormal; Notable for the following components:    POCT Glucose 128 (*)     All other components within normal limits    COVID/FLU A&B PCR - Normal   LACTIC ACID, PLASMA - Normal   BLOOD CULTURE [facility]   BLOOD CULTURE [facility]   CBC W/ AUTO DIFFERENTIAL    Narrative:     The following orders were created for panel order CBC Auto Differential.  Procedure                               Abnormality         Status                     ---------                               -----------         ------                     CBC with Differential[[id number]]        Abnormal            Final result                 Please view results for these tests on the individual orders.   POCT TROPONIN     EKG Readings: (Independently Interpreted)   Initial Reading: No STEMI. Rhythm: Normal Sinus Rhythm. Heart Rate: 81. Ectopy: No Ectopy.   nonspecific t wave abnormality       Imaging Results          CT Abdomen Pelvis  Without Contrast (Final result)  Result time 07/01/22 14:55:35    Final result by Deja Thompson MD (07/01/22 14:55:35)                 Impression:      No acute abnormality of the abdomen or pelvis.      Electronically signed by: Deja Thompson  Date:    07/01/2022  Time:    14:55             Narrative:    EXAMINATION:  CT ABDOMEN PELVIS WITHOUT CONTRAST    CLINICAL HISTORY:  fever, constipation;    TECHNIQUE:  CT imaging was performed of the abdomen and pelvis without intravenous contrast. Dose length product is 1004 mGycm. Automatic exposure control, adjustment of mA/kV or iterative reconstruction technique was used to limit radiation dose.    COMPARISON:  CT abdomen pelvis dated 05/17/2021    FINDINGS:  Assessment of the visceral organs and vasculature is limited by the lack of IV contrast.    Liver/biliary: No concerning hepatic findings. No radiodense gallstone or biliary dilatation appreciated.    Pancreas: Normal.    Spleen: Normal.    Adrenals: Normal.    Kidneys, ureters and bladder: There are bilateral renal cysts better seen on comparison CT.  The bladder is within normal limits.    Reproductive organs: The uterus has been  surgically resected.    Stomach/bowel: No evidence of bowel obstruction. Normal appendix. Colonic diverticulosis without inflammatory changes.    Lymph nodes: No pathologically enlarged lymph node identified with noncontrast technique.    Peritoneum: No ascites or free air.    Vessels: Moderate scattered vascular calcifications.    Abdominal wall: Normal.    Lung bases: No consolidation or pleural effusion.    Bones: No acute osseous findings.                               X-Ray Chest 1 View (Final result)  Result time 07/01/22 12:33:37    Final result by Shasta White MD (07/01/22 12:33:37)                 Impression:      No acute cardiopulmonary abnormality.      Electronically signed by: Shasta White  Date:    07/01/2022  Time:    12:33             Narrative:    EXAMINATION:  XR CHEST 1 VIEW    CLINICAL HISTORY:  Fever, unspecified    TECHNIQUE:  Single frontal view of the chest was performed.    COMPARISON:  08/12/2020    FINDINGS:  LINES AND TUBES: EKG/telemetry leads overlie the chest.    MEDIASTINUM AND POLINA: The cardiac silhouette is normal. Aortic atherosclerosis.    LUNGS: No lobar consolidation. No edema.    PLEURA:No pleural effusion. No pneumothorax.    BONES: No acute osseous abnormality.                              X-Rays:   Independently Interpreted Readings:   Chest X-Ray: No acute abnormalities.   Abdomen: No acute changes.     Medications   acetaminophen tablet 1,000 mg (1,000 mg Oral Given 7/1/22 1255)   0.9%  NaCl infusion (0 mLs Intravenous Stopped 7/1/22 1537)   aluminum-magnesium hydroxide-simethicone 200-200-20 mg/5 mL suspension 30 mL (30 mLs Oral Given 7/1/22 1521)     And   LIDOcaine HCl 2% oral solution 10 mL (10 mLs Oral Given 7/1/22 1630)     Medical Decision Making:   Differential Diagnosis:   Viral illness, covid, flu, UTI  ED Management:  Workup for fever currently negative, discussed results with patient and daughter at bedside- deny any open wounds, bedsores, other  sources of skin infection.  CT abdomen pelvis negative.  Patient still has her spleen, not on any steroid medications or immunosuppresants.  Inquired regarding kidney lesion on imaging noted in patient's chart- referred to UROLOGY at [facility] for bosniak IIF or III lesion in L superior pole.  Asymptomatic. The Urologist recommended refer to [facility] for surgical eval. Pt continues to refuse urology referral to [facility]. Surveillance with CT/MRI recommended every 6 months. Patient's daughter states  due to patient's age they were not going to further investigate.  Patient has not had any night sweats or weight loss or signs of malignancy.  Patient does live with multiple people in her home including small children so discussed with the patient's daughter that this could be a source of viral illness and to continue to monitor fevers for the next 24 hours as well as for any new symptoms.  patient advised to follow-up with PCP for any concerns and strict ER precaution given.All questions and concerns addressed. VSS, pt  afebrile at time of DC             ED Course as of 07/01/22 1644   Fri Jul 01, 2022   1438 Given acetaminophen 1000mg for fever [MG]      ED Course User Index  [MG] Lacy Bain MD             Clinical Impression:   Final diagnoses:  [R50.9] Fever  [B34.9] Viral syndrome (Primary)          ED Disposition Condition    Discharge Stable        ED Prescriptions     None        Follow-up Information     Follow up With Specialties Details Why Contact Info    [facility] Nurse Practitioner In 1 week As needed, If symptoms worsen [address] [facility] [id number] [phone]      [facility] - Emergency Dept Emergency Medicine  As needed [address] [facility] [id number] [phone]           Lacy Bain MD  07/01/22 1644       Lacy Bain MD  07/01/22 1640

## 2022-07-06 LAB
BACTERIA BLD CULT: ABNORMAL
BACTERIA BLD CULT: NORMAL
GRAM STN SPEC: ABNORMAL

## 2022-07-25 ENCOUNTER — OFFICE VISIT (OUTPATIENT)
Dept: UROLOGY | Facility: CLINIC | Age: 87
End: 2022-07-25
Attending: NURSE PRACTITIONER
Payer: MEDICARE

## 2022-07-25 VITALS
DIASTOLIC BLOOD PRESSURE: 82 MMHG | HEIGHT: 64 IN | RESPIRATION RATE: 18 BRPM | WEIGHT: 200.38 LBS | TEMPERATURE: 98 F | SYSTOLIC BLOOD PRESSURE: 160 MMHG | HEART RATE: 68 BPM | OXYGEN SATURATION: 97 % | BODY MASS INDEX: 34.21 KG/M2

## 2022-07-25 DIAGNOSIS — N28.89 RENAL MASS, LEFT: ICD-10-CM

## 2022-07-25 DIAGNOSIS — N28.89 OTHER SPECIFIED DISORDERS OF KIDNEY AND URETER: ICD-10-CM

## 2022-07-25 DIAGNOSIS — N28.1 BILATERAL RENAL CYSTS: Primary | ICD-10-CM

## 2022-07-25 PROCEDURE — 99214 OFFICE O/P EST MOD 30 MIN: CPT | Mod: PBBFAC

## 2022-07-25 NOTE — PROGRESS NOTES
Chief Complaint:   Chief Complaint   Patient presents with    left renal mass       HPI:   86 yo F HTN, HLD presented to the Urology Clinic to follow up on bilateral renal cysts,     She was referred to UROLOGY at [facility] for bosniak IIF or III lesion in L superior pole. Asymptomatic. Surveillance with CT/MRI regardless every 6 months.    Patient has been asymptomatic thus far. Denies fatigue, weight loss, change in appetite.       Allergies:  Review of patient's allergies indicates:  No Known Allergies    Medications:  Current Outpatient Medications   Medication Sig Dispense Refill    amLODIPine (NORVASC) 10 MG tablet Take 1 tablet (10 mg total) by mouth once daily. 90 tablet 1    benazepriL (LOTENSIN) 40 MG tablet Take 1 tablet (40 mg total) by mouth once daily. 90 tablet 1    diclofenac (VOLTAREN) 50 MG EC tablet Take 1 tablet (50 mg total) by mouth 2 (two) times daily as needed (pain). 45 tablet 0    famotidine (PEPCID) 20 MG tablet Take 1 tablet (20 mg total) by mouth nightly as needed for Heartburn. 30 tablet 2    simvastatin (ZOCOR) 40 MG tablet Take 1 tablet (40 mg total) by mouth every evening. 90 tablet 1    triamterene-hydrochlorothiazide 37.5-25 mg (DYAZIDE) 37.5-25 mg per capsule Take 1 capsule by mouth once daily. 90 capsule 1     No current facility-administered medications for this visit.       Review of Systems:  General: No fever, chills, fatigability, or weight loss.  Skin: No rashes, itching, or changes in color or texture of skin.  Chest: Denies DONAHUE, cyanosis, wheezing, cough, and sputum production.  Abdomen: Appetite fine. No weight loss. Denies diarrhea, abdominal pain, hematemesis, or blood in stool.  Musculoskeletal: No joint stiffness or swelling. Denies back pain.  : As above.  All other review of systems negative.    PMH:  Past Medical History:   Diagnosis Date    Essential (primary) hypertension     Heart disease        PSH:  Past Surgical History:   Procedure Laterality Date     HYSTERECTOMY      LEFT HEART CATHETERIZATION  06/11/2021       FamHx:  Family History   Problem Relation Age of Onset    Breast cancer Mother     Breast cancer Sister        SocHx:  Social History     Socioeconomic History    Marital status:     Number of children: 10   Occupational History    Occupation: retired   Tobacco Use    Smoking status: Never Smoker    Smokeless tobacco: Never Used       Physical Exam:  Vitals:    07/25/22 0851   BP: (!) 160/82   Pulse: 68   Resp: 18   Temp: 97.9 °F (36.6 °C)     General: A&Ox3, no apparent distress, no deformities  Neck: No masses, normal thyroid  Lungs: CTA david, no use of accessory muscles  Heart: RRR, no arrhythmias  Abdomen: Soft, NT, ND, no masses, no hernias, no hepatosplenomegaly  Lymphatic: Neck and groin nodes negative  Skin: The skin is warm and dry. No jaundice.  Ext: No c/c/e.      Labs:     Imaging:   CT abdomen pelvis w wo contrast: 1. Enhancing mass again seen involving the posterior aspect of the superior pole of the left kidney. While this could represent a neoplasm, it has not changed in size or appearance when compared to the exam from 08/30/2019. 2. Chronic findings as above.       Impression:  Bilateral kidney cyst/mass    Plan:  - Surveillance in 6 months with repeat CT abdomen/pelvis with and without contrast.   - RTC in 6 months

## 2022-09-19 ENCOUNTER — LAB VISIT (OUTPATIENT)
Dept: LAB | Facility: HOSPITAL | Age: 87
End: 2022-09-19
Attending: NURSE PRACTITIONER
Payer: MEDICARE

## 2022-09-19 DIAGNOSIS — E78.5 HYPERLIPIDEMIA, UNSPECIFIED HYPERLIPIDEMIA TYPE: ICD-10-CM

## 2022-09-19 DIAGNOSIS — N28.89 RENAL MASS, LEFT: ICD-10-CM

## 2022-09-19 DIAGNOSIS — I10 HYPERTENSION, UNSPECIFIED TYPE: ICD-10-CM

## 2022-09-19 DIAGNOSIS — E55.9 VITAMIN D DEFICIENCY: ICD-10-CM

## 2022-09-19 LAB
ALBUMIN SERPL-MCNC: 3.5 GM/DL (ref 3.4–4.8)
ALBUMIN/GLOB SERPL: 0.9 RATIO (ref 1.1–2)
ALP SERPL-CCNC: 67 UNIT/L (ref 40–150)
ALT SERPL-CCNC: 12 UNIT/L (ref 0–55)
AST SERPL-CCNC: 18 UNIT/L (ref 5–34)
BASOPHILS # BLD AUTO: 0.04 X10(3)/MCL (ref 0–0.2)
BASOPHILS NFR BLD AUTO: 0.6 %
BILIRUBIN DIRECT+TOT PNL SERPL-MCNC: 0.8 MG/DL
BUN SERPL-MCNC: 27.9 MG/DL (ref 9.8–20.1)
CALCIUM SERPL-MCNC: 9.7 MG/DL (ref 8.4–10.2)
CHLORIDE SERPL-SCNC: 105 MMOL/L (ref 98–107)
CHOLEST SERPL-MCNC: 165 MG/DL
CHOLEST/HDLC SERPL: 4 {RATIO} (ref 0–5)
CO2 SERPL-SCNC: 28 MMOL/L (ref 23–31)
CREAT SERPL-MCNC: 0.99 MG/DL (ref 0.55–1.02)
DEPRECATED CALCIDIOL+CALCIFEROL SERPL-MC: 38 NG/ML (ref 30–80)
EOSINOPHIL # BLD AUTO: 0.78 X10(3)/MCL (ref 0–0.9)
EOSINOPHIL NFR BLD AUTO: 11.5 %
ERYTHROCYTE [DISTWIDTH] IN BLOOD BY AUTOMATED COUNT: 13.5 % (ref 11.5–17)
GFR SERPLBLD CREATININE-BSD FMLA CKD-EPI: 56 MLS/MIN/1.73/M2
GLOBULIN SER-MCNC: 3.7 GM/DL (ref 2.4–3.5)
GLUCOSE SERPL-MCNC: 104 MG/DL (ref 82–115)
HCT VFR BLD AUTO: 36.2 % (ref 37–47)
HDLC SERPL-MCNC: 42 MG/DL (ref 35–60)
HGB BLD-MCNC: 11 GM/DL (ref 12–16)
IMM GRANULOCYTES # BLD AUTO: 0.01 X10(3)/MCL (ref 0–0.04)
IMM GRANULOCYTES NFR BLD AUTO: 0.1 %
LDLC SERPL CALC-MCNC: 99 MG/DL (ref 50–140)
LYMPHOCYTES # BLD AUTO: 2.33 X10(3)/MCL (ref 0.6–4.6)
LYMPHOCYTES NFR BLD AUTO: 34.2 %
MCH RBC QN AUTO: 28.6 PG (ref 27–31)
MCHC RBC AUTO-ENTMCNC: 30.4 MG/DL (ref 33–36)
MCV RBC AUTO: 94.3 FL (ref 80–94)
MONOCYTES # BLD AUTO: 0.56 X10(3)/MCL (ref 0.1–1.3)
MONOCYTES NFR BLD AUTO: 8.2 %
NEUTROPHILS # BLD AUTO: 3.1 X10(3)/MCL (ref 2.1–9.2)
NEUTROPHILS NFR BLD AUTO: 45.4 %
PLATELET # BLD AUTO: 335 X10(3)/MCL (ref 130–400)
PMV BLD AUTO: 9.5 FL (ref 7.4–10.4)
POTASSIUM SERPL-SCNC: 3.6 MMOL/L (ref 3.5–5.1)
PROT SERPL-MCNC: 7.2 GM/DL (ref 5.8–7.6)
RBC # BLD AUTO: 3.84 X10(6)/MCL (ref 4.2–5.4)
SODIUM SERPL-SCNC: 142 MMOL/L (ref 136–145)
TRIGL SERPL-MCNC: 120 MG/DL (ref 37–140)
VLDLC SERPL CALC-MCNC: 24 MG/DL
WBC # SPEC AUTO: 6.8 X10(3)/MCL (ref 4.5–11.5)

## 2022-09-19 PROCEDURE — 80061 LIPID PANEL: CPT

## 2022-09-19 PROCEDURE — 36415 COLL VENOUS BLD VENIPUNCTURE: CPT

## 2022-09-19 PROCEDURE — 85025 COMPLETE CBC W/AUTO DIFF WBC: CPT

## 2022-09-19 PROCEDURE — 82306 VITAMIN D 25 HYDROXY: CPT

## 2022-09-19 PROCEDURE — 80053 COMPREHEN METABOLIC PANEL: CPT

## 2022-09-22 ENCOUNTER — OFFICE VISIT (OUTPATIENT)
Dept: INTERNAL MEDICINE | Facility: CLINIC | Age: 87
End: 2022-09-22
Payer: MEDICARE

## 2022-09-22 VITALS
DIASTOLIC BLOOD PRESSURE: 56 MMHG | SYSTOLIC BLOOD PRESSURE: 133 MMHG | WEIGHT: 202.63 LBS | BODY MASS INDEX: 34.59 KG/M2 | HEART RATE: 60 BPM | RESPIRATION RATE: 16 BRPM | HEIGHT: 64 IN | TEMPERATURE: 98 F

## 2022-09-22 DIAGNOSIS — E66.9 OBESITY, UNSPECIFIED CLASSIFICATION, UNSPECIFIED OBESITY TYPE, UNSPECIFIED WHETHER SERIOUS COMORBIDITY PRESENT: ICD-10-CM

## 2022-09-22 DIAGNOSIS — N28.89 RENAL MASS: ICD-10-CM

## 2022-09-22 DIAGNOSIS — E55.9 VITAMIN D DEFICIENCY: ICD-10-CM

## 2022-09-22 DIAGNOSIS — E78.5 HYPERLIPIDEMIA, UNSPECIFIED HYPERLIPIDEMIA TYPE: ICD-10-CM

## 2022-09-22 DIAGNOSIS — I10 HYPERTENSION, UNSPECIFIED TYPE: ICD-10-CM

## 2022-09-22 DIAGNOSIS — R09.82 PND (POST-NASAL DRIP): ICD-10-CM

## 2022-09-22 PROCEDURE — 3078F PR MOST RECENT DIASTOLIC BLOOD PRESSURE < 80 MM HG: ICD-10-PCS | Mod: CPTII,,, | Performed by: NURSE PRACTITIONER

## 2022-09-22 PROCEDURE — 1126F PR PAIN SEVERITY QUANTIFIED, NO PAIN PRESENT: ICD-10-PCS | Mod: CPTII,,, | Performed by: NURSE PRACTITIONER

## 2022-09-22 PROCEDURE — 3075F SYST BP GE 130 - 139MM HG: CPT | Mod: CPTII,,, | Performed by: NURSE PRACTITIONER

## 2022-09-22 PROCEDURE — 1160F PR REVIEW ALL MEDS BY PRESCRIBER/CLIN PHARMACIST DOCUMENTED: ICD-10-PCS | Mod: CPTII,,, | Performed by: NURSE PRACTITIONER

## 2022-09-22 PROCEDURE — 1159F PR MEDICATION LIST DOCUMENTED IN MEDICAL RECORD: ICD-10-PCS | Mod: CPTII,,, | Performed by: NURSE PRACTITIONER

## 2022-09-22 PROCEDURE — 1126F AMNT PAIN NOTED NONE PRSNT: CPT | Mod: CPTII,,, | Performed by: NURSE PRACTITIONER

## 2022-09-22 PROCEDURE — 99214 OFFICE O/P EST MOD 30 MIN: CPT | Mod: PBBFAC | Performed by: NURSE PRACTITIONER

## 2022-09-22 PROCEDURE — 1160F RVW MEDS BY RX/DR IN RCRD: CPT | Mod: CPTII,,, | Performed by: NURSE PRACTITIONER

## 2022-09-22 PROCEDURE — 1101F PT FALLS ASSESS-DOCD LE1/YR: CPT | Mod: CPTII,,, | Performed by: NURSE PRACTITIONER

## 2022-09-22 PROCEDURE — 99214 PR OFFICE/OUTPT VISIT, EST, LEVL IV, 30-39 MIN: ICD-10-PCS | Mod: S$PBB,,, | Performed by: NURSE PRACTITIONER

## 2022-09-22 PROCEDURE — 99214 OFFICE O/P EST MOD 30 MIN: CPT | Mod: S$PBB,,, | Performed by: NURSE PRACTITIONER

## 2022-09-22 PROCEDURE — 3288F FALL RISK ASSESSMENT DOCD: CPT | Mod: CPTII,,, | Performed by: NURSE PRACTITIONER

## 2022-09-22 PROCEDURE — 1159F MED LIST DOCD IN RCRD: CPT | Mod: CPTII,,, | Performed by: NURSE PRACTITIONER

## 2022-09-22 PROCEDURE — 3075F PR MOST RECENT SYSTOLIC BLOOD PRESS GE 130-139MM HG: ICD-10-PCS | Mod: CPTII,,, | Performed by: NURSE PRACTITIONER

## 2022-09-22 PROCEDURE — 1101F PR PT FALLS ASSESS DOC 0-1 FALLS W/OUT INJ PAST YR: ICD-10-PCS | Mod: CPTII,,, | Performed by: NURSE PRACTITIONER

## 2022-09-22 PROCEDURE — 3288F PR FALLS RISK ASSESSMENT DOCUMENTED: ICD-10-PCS | Mod: CPTII,,, | Performed by: NURSE PRACTITIONER

## 2022-09-22 PROCEDURE — 3078F DIAST BP <80 MM HG: CPT | Mod: CPTII,,, | Performed by: NURSE PRACTITIONER

## 2022-09-22 RX ORDER — TRIAMTERENE AND HYDROCHLOROTHIAZIDE 37.5; 25 MG/1; MG/1
1 CAPSULE ORAL DAILY
Qty: 90 CAPSULE | Refills: 1 | Status: SHIPPED | OUTPATIENT
Start: 2022-09-22 | End: 2023-01-23 | Stop reason: SDUPTHER

## 2022-09-22 RX ORDER — SIMVASTATIN 40 MG/1
40 TABLET, FILM COATED ORAL NIGHTLY
Qty: 90 TABLET | Refills: 1 | Status: SHIPPED | OUTPATIENT
Start: 2022-09-22 | End: 2023-01-23 | Stop reason: SDUPTHER

## 2022-09-22 RX ORDER — BENAZEPRIL HYDROCHLORIDE 40 MG/1
40 TABLET ORAL DAILY
Qty: 90 TABLET | Refills: 1 | Status: SHIPPED | OUTPATIENT
Start: 2022-09-22 | End: 2023-01-23 | Stop reason: SDUPTHER

## 2022-09-22 RX ORDER — FLUTICASONE PROPIONATE 50 MCG
1 SPRAY, SUSPENSION (ML) NASAL DAILY
Qty: 16 G | Refills: 4 | Status: SHIPPED | OUTPATIENT
Start: 2022-09-22 | End: 2023-01-23

## 2022-09-22 RX ORDER — FAMOTIDINE 20 MG/1
20 TABLET, FILM COATED ORAL NIGHTLY PRN
Qty: 30 TABLET | Refills: 2 | Status: SHIPPED | OUTPATIENT
Start: 2022-09-22 | End: 2023-01-23 | Stop reason: SDUPTHER

## 2022-09-22 NOTE — PROGRESS NOTES
Internal Medicine Clinic  [facility]     Patient Name: Leda Gallardo   : 1935  MRN:[id number]     CC:  Chief Complaint   Patient presents with    Follow-up     Labs results        HPI  87 year old AAF, presents in clinic with daughter for routine lab f/u. C/o hoarseness for several days, having to clear throat often. Afebrile.  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 Nonsmoker. On diclofenac prn for chronic david foot/toe /ankle pain. Not taking diclofenac currently. Pain to left great toe only with walking. Denies chest pain, shortness of breath at rest, cough, fever, headache, dizziness, weakness, abdominal pain, nausea, vomiting, diarrhea, constipation, edema, dysuria, depression, anxiety.  Pneumo vac UTD.    Following UROLOGY at [facility] for bosniak IIF or III lesion in L superior pole.  Asymptomatic. The Urologist recommended refer to [facility] for surgical eval.  She continues to refuse urology referral to [facility].  Surveillance with CT/MRI regardless every 6 months.     Family history of primary malignant neoplasm of breast: Mother and Sister  MMG 2019 BIRADS 1; scheduled 2021;no showed and declines MMG in future     ECHO  EF 65%  ECHO  EF 60-65%  2021:coronary angiogram  Coronary stenosis:  discussed the option of CABG versus high risk PCI versus medical management.  Started on Imdur 20 mg p.o. daily; no longer taking. Missed f/u cardio apt and advised to reschedule.        Mild aortic stenosis/mild aortic regurgitation per TTE 8.12.20  LVEF -60-65% (TTE 8.12.20)     Abnormal nuclear stress test  Chest pain/DONAHUE  Lexiscan stress test -Apical defect, medium size, moderate intensity, partially reversible (1.27.21)  LVEF-60-65%  (TTE 8.12.20)     CT abd with contrast 2021;Enhancing mass again seen involving the posterior aspect of the  superior pole of the left kidney. While this could represent a  neoplasm, it has not  changed in size or appearance when compared to  the exam from 08/30/2019.           ROS  Review of Systems   Constitutional: Negative.    HENT: Negative.     Eyes: Negative.    Respiratory: Negative.     Cardiovascular: Negative.    Gastrointestinal: Negative.    Endocrine: Negative.    Genitourinary: Negative.    Musculoskeletal: Negative.    Integumentary:  Negative.   Allergic/Immunologic: Negative.    Neurological: Negative.    Hematological: Negative.    Psychiatric/Behavioral: Negative.     All other systems reviewed and are negative.     Physical Examination:  Vitals:    09/22/22 0911   BP: (!) 133/56   Pulse: 60   Resp: 16   Temp: 97.6 °F (36.4 °C)          Physical Exam  Vitals and nursing note reviewed.   Constitutional:       Appearance: Normal appearance.   HENT:      Head: Normocephalic and atraumatic.      Right Ear: Tympanic membrane, ear canal and external ear normal.      Left Ear: Tympanic membrane, ear canal and external ear normal.      Nose: Nose normal.      Mouth/Throat:      Mouth: Mucous membranes are moist.      Pharynx: Oropharynx is clear.   Eyes:      Extraocular Movements: Extraocular movements intact.      Conjunctiva/sclera: Conjunctivae normal.      Pupils: Pupils are equal, round, and reactive to light.   Cardiovascular:      Rate and Rhythm: Normal rate and regular rhythm.      Pulses: Normal pulses.      Heart sounds: Normal heart sounds.   Pulmonary:      Effort: Pulmonary effort is normal.      Breath sounds: Normal breath sounds.   Abdominal:      General: Abdomen is flat. Bowel sounds are normal.      Palpations: Abdomen is soft.   Musculoskeletal:         General: Normal range of motion.      Cervical back: Normal range of motion and neck supple.   Skin:     General: Skin is warm and dry.      Capillary Refill: Capillary refill takes less than 2 seconds.   Neurological:      General: No focal deficit present.      Mental Status: She is alert and oriented to person, place, and  time. Mental status is at baseline.   Psychiatric:         Mood and Affect: Mood normal.         Behavior: Behavior normal.         Thought Content: Thought content normal.         Judgment: Judgment normal.          Labs/Imaging:  Reviewed    EXAMINATION:  CT ABDOMEN PELVIS WITHOUT CONTRAST     CLINICAL HISTORY:  fever, constipation;     TECHNIQUE:  CT imaging was performed of the abdomen and pelvis without intravenous contrast. Dose length product is 1004 mGycm. Automatic exposure control, adjustment of mA/kV or iterative reconstruction technique was used to limit radiation dose.     COMPARISON:  CT abdomen pelvis dated 05/17/2021     FINDINGS:  Assessment of the visceral organs and vasculature is limited by the lack of IV contrast.     Liver/biliary: No concerning hepatic findings. No radiodense gallstone or biliary dilatation appreciated.     Pancreas: Normal.     Spleen: Normal.     Adrenals: Normal.     Kidneys, ureters and bladder: There are bilateral renal cysts better seen on comparison CT.  The bladder is within normal limits.     Reproductive organs: The uterus has been surgically resected.     Stomach/bowel: No evidence of bowel obstruction. Normal appendix. Colonic diverticulosis without inflammatory changes.     Lymph nodes: No pathologically enlarged lymph node identified with noncontrast technique.     Peritoneum: No ascites or free air.     Vessels: Moderate scattered vascular calcifications.     Abdominal wall: Normal.     Lung bases: No consolidation or pleural effusion.     Bones: No acute osseous findings.     Impression:     No acute abnormality of the abdomen or pelvis.        Electronically signed by: Deja Thompson  Date:                                            07/01/2022    Assessment/Plan:  1. Hyperlipidemia, unspecified hyperlipidemia type  - CBC Auto Differential; Future  - Comprehensive Metabolic Panel; Future  - Lipid Panel; Future  Lab Results   Component Value Date    LDL 99.00  09/19/2022    HDL 42 09/19/2022    TRIG 120 09/19/2022       Cont RX daily; refills given. Take Omega 3 daily.   Stressed importance of dietary modifications. Follow a low cholesterol, low saturated fat diet with less that 200mg of cholesterol a day.  Avoid fried foods and high saturated fats (high saturated fats less than 7% of calories).  Add Flax Seed/Fish Oil supplements to diet. Increase dietary fiber.  Regular exercise can reduce LDL and raise HDL. Stressed importance of physical activity 5 times per week for 30 minutes per day.      2. Hypertension, unspecified type  - CBC Auto Differential; Future  - Comprehensive Metabolic Panel; Future  - Lipid Panel; Future    BP stable. Med refills.  DASH diet: Eat more fruits, vegetables, and low fat dairy foods.  (Less than 2 grams of sodium per day).  Maintain healthy weight with goal BMI <30.   Exercise 30 minutes per day 5 days per week.  Home medications refilled and continued.   Home BP monitoring encouraged with BP parameters given.    Sodium Level   Date Value Ref Range Status   09/19/2022 142 136 - 145 mmol/L Final     Potassium Level   Date Value Ref Range Status   09/19/2022 3.6 3.5 - 5.1 mmol/L Final     Blood Urea Nitrogen   Date Value Ref Range Status   09/19/2022 27.9 (H) 9.8 - 20.1 mg/dL Final     Creatinine   Date Value Ref Range Status   09/19/2022 0.99 0.55 - 1.02 mg/dL Final       3. Renal mass  Following UROLOGY, CT every 6 months    4. PND (post-nasal drip)  Flonase started  Notify clinic if no improvement or change in symptoms    5. Obesity, unspecified classification, unspecified obesity type, unspecified whether serious comorbidity present  Goal BMI <30.  Exercise 5 times a week for 30 minutes per day.  Avoid soda, simple sugars, excessive rice, potatoes or bread. Limit fast foods and fried foods.  Choose complex carbs in moderation (example: green vegetables, beans, oatmeal). Eat plenty of fresh fruits and vegetables with lean meats daily.  Do  not skip meals. Eat a balanced portion size.  Avoid fad diets. Consider permanent healthy life style changes.     6. Vitamin D deficiency  - Vitamin D; Future   VIT D at goal. Take otc vit d once a day.    Medication List with Changes/Refills   New Medications    FLUTICASONE PROPIONATE (FLONASE) 50 MCG/ACTUATION NASAL SPRAY    1 spray (50 mcg total) by Each Nostril route once daily.   Current Medications    AMLODIPINE (NORVASC) 10 MG TABLET    Take 1 tablet (10 mg total) by mouth once daily.    DICLOFENAC (VOLTAREN) 50 MG EC TABLET    Take 1 tablet (50 mg total) by mouth 2 (two) times daily as needed (pain).   Changed and/or Refilled Medications    Modified Medication Previous Medication    BENAZEPRIL (LOTENSIN) 40 MG TABLET benazepriL (LOTENSIN) 40 MG tablet       Take 1 tablet (40 mg total) by mouth once daily.    Take 1 tablet (40 mg total) by mouth once daily.    FAMOTIDINE (PEPCID) 20 MG TABLET famotidine (PEPCID) 20 MG tablet       Take 1 tablet (20 mg total) by mouth nightly as needed for Heartburn.    Take 1 tablet (20 mg total) by mouth nightly as needed for Heartburn.    SIMVASTATIN (ZOCOR) 40 MG TABLET simvastatin (ZOCOR) 40 MG tablet       Take 1 tablet (40 mg total) by mouth every evening.    Take 1 tablet (40 mg total) by mouth every evening.    TRIAMTERENE-HYDROCHLOROTHIAZIDE 37.5-25 MG (DYAZIDE) 37.5-25 MG PER CAPSULE triamterene-hydrochlorothiazide 37.5-25 mg (DYAZIDE) 37.5-25 mg per capsule       Take 1 capsule by mouth once daily.    Take 1 capsule by mouth once daily.        Orders Placed This Encounter   Procedures    CBC Auto Differential    Comprehensive Metabolic Panel    Vitamin D    Lipid Panel           Future Appointments   Date Time Provider Department Center   1/23/2023  8:15 AM [facility] Albarado [facility]   1/30/2023 10:00 AM Blaire Mendez DO [facility] ALFRED [facility]         Labs thoroughly reviewed with patient. Medication refills addressed today.  RTC prn and 4  months, with labs 1 week prior to the apt.  COVID 19 precautions given to patient.  Patient voices understanding of all discharge instructions.

## 2023-01-11 ENCOUNTER — LAB VISIT (OUTPATIENT)
Dept: LAB | Facility: HOSPITAL | Age: 88
End: 2023-01-11
Attending: NURSE PRACTITIONER
Payer: MEDICARE

## 2023-01-11 DIAGNOSIS — I10 HYPERTENSION, UNSPECIFIED TYPE: ICD-10-CM

## 2023-01-11 DIAGNOSIS — E78.5 HYPERLIPIDEMIA, UNSPECIFIED HYPERLIPIDEMIA TYPE: ICD-10-CM

## 2023-01-11 DIAGNOSIS — E55.9 VITAMIN D DEFICIENCY: ICD-10-CM

## 2023-01-11 LAB
ALBUMIN SERPL-MCNC: 3.7 G/DL (ref 3.4–4.8)
ALBUMIN/GLOB SERPL: 1.2 RATIO (ref 1.1–2)
ALP SERPL-CCNC: 64 UNIT/L (ref 40–150)
ALT SERPL-CCNC: 15 UNIT/L (ref 0–55)
AST SERPL-CCNC: 18 UNIT/L (ref 5–34)
BASOPHILS # BLD AUTO: 0.03 X10(3)/MCL (ref 0–0.2)
BASOPHILS NFR BLD AUTO: 0.4 %
BILIRUBIN DIRECT+TOT PNL SERPL-MCNC: 0.8 MG/DL
BUN SERPL-MCNC: 29.4 MG/DL (ref 9.8–20.1)
CALCIUM SERPL-MCNC: 9.2 MG/DL (ref 8.4–10.2)
CHLORIDE SERPL-SCNC: 102 MMOL/L (ref 98–107)
CHOLEST SERPL-MCNC: 143 MG/DL
CHOLEST/HDLC SERPL: 3 {RATIO} (ref 0–5)
CO2 SERPL-SCNC: 29 MMOL/L (ref 23–31)
CREAT SERPL-MCNC: 0.86 MG/DL (ref 0.55–1.02)
DEPRECATED CALCIDIOL+CALCIFEROL SERPL-MC: 42.2 NG/ML (ref 30–80)
EOSINOPHIL # BLD AUTO: 0.81 X10(3)/MCL (ref 0–0.9)
EOSINOPHIL NFR BLD AUTO: 11.1 %
ERYTHROCYTE [DISTWIDTH] IN BLOOD BY AUTOMATED COUNT: 13.3 % (ref 11.5–17)
GFR SERPLBLD CREATININE-BSD FMLA CKD-EPI: >60 MLS/MIN/1.73/M2
GLOBULIN SER-MCNC: 3.2 GM/DL (ref 2.4–3.5)
GLUCOSE SERPL-MCNC: 107 MG/DL (ref 82–115)
HCT VFR BLD AUTO: 35.8 % (ref 37–47)
HDLC SERPL-MCNC: 46 MG/DL (ref 35–60)
HGB BLD-MCNC: 10.7 GM/DL (ref 12–16)
IMM GRANULOCYTES # BLD AUTO: 0.01 X10(3)/MCL (ref 0–0.04)
IMM GRANULOCYTES NFR BLD AUTO: 0.1 %
LDLC SERPL CALC-MCNC: 78 MG/DL (ref 50–140)
LYMPHOCYTES # BLD AUTO: 2.71 X10(3)/MCL (ref 0.6–4.6)
LYMPHOCYTES NFR BLD AUTO: 37.1 %
MCH RBC QN AUTO: 28.3 PG
MCHC RBC AUTO-ENTMCNC: 29.9 MG/DL (ref 33–36)
MCV RBC AUTO: 94.7 FL (ref 80–94)
MONOCYTES # BLD AUTO: 0.65 X10(3)/MCL (ref 0.1–1.3)
MONOCYTES NFR BLD AUTO: 8.9 %
NEUTROPHILS # BLD AUTO: 3.1 X10(3)/MCL (ref 2.1–9.2)
NEUTROPHILS NFR BLD AUTO: 42.4 %
PLATELET # BLD AUTO: 328 X10(3)/MCL (ref 130–400)
PMV BLD AUTO: 9.4 FL (ref 7.4–10.4)
POTASSIUM SERPL-SCNC: 4 MMOL/L (ref 3.5–5.1)
PROT SERPL-MCNC: 6.9 GM/DL (ref 5.8–7.6)
RBC # BLD AUTO: 3.78 X10(6)/MCL (ref 4.2–5.4)
SODIUM SERPL-SCNC: 140 MMOL/L (ref 136–145)
TRIGL SERPL-MCNC: 96 MG/DL (ref 37–140)
VLDLC SERPL CALC-MCNC: 19 MG/DL
WBC # SPEC AUTO: 7.3 X10(3)/MCL (ref 4.5–11.5)

## 2023-01-11 PROCEDURE — 36415 COLL VENOUS BLD VENIPUNCTURE: CPT

## 2023-01-11 PROCEDURE — 80061 LIPID PANEL: CPT

## 2023-01-11 PROCEDURE — 82306 VITAMIN D 25 HYDROXY: CPT

## 2023-01-11 PROCEDURE — 85025 COMPLETE CBC W/AUTO DIFF WBC: CPT

## 2023-01-11 PROCEDURE — 80053 COMPREHEN METABOLIC PANEL: CPT

## 2023-01-23 ENCOUNTER — OFFICE VISIT (OUTPATIENT)
Dept: INTERNAL MEDICINE | Facility: CLINIC | Age: 88
End: 2023-01-23
Payer: MEDICARE

## 2023-01-23 VITALS
HEIGHT: 64 IN | SYSTOLIC BLOOD PRESSURE: 129 MMHG | BODY MASS INDEX: 35.34 KG/M2 | HEART RATE: 60 BPM | DIASTOLIC BLOOD PRESSURE: 62 MMHG | WEIGHT: 207 LBS | TEMPERATURE: 98 F | RESPIRATION RATE: 16 BRPM

## 2023-01-23 DIAGNOSIS — Z23 NEED FOR INFLUENZA VACCINATION: ICD-10-CM

## 2023-01-23 DIAGNOSIS — E66.9 OBESITY, UNSPECIFIED CLASSIFICATION, UNSPECIFIED OBESITY TYPE, UNSPECIFIED WHETHER SERIOUS COMORBIDITY PRESENT: ICD-10-CM

## 2023-01-23 DIAGNOSIS — N28.89 RENAL MASS: ICD-10-CM

## 2023-01-23 DIAGNOSIS — M25.572 CHRONIC PAIN OF LEFT ANKLE: ICD-10-CM

## 2023-01-23 DIAGNOSIS — I10 HYPERTENSION, UNSPECIFIED TYPE: ICD-10-CM

## 2023-01-23 DIAGNOSIS — Z78.0 OSTEOPENIA AFTER MENOPAUSE: ICD-10-CM

## 2023-01-23 DIAGNOSIS — E78.5 HYPERLIPIDEMIA, UNSPECIFIED HYPERLIPIDEMIA TYPE: ICD-10-CM

## 2023-01-23 DIAGNOSIS — E55.9 VITAMIN D DEFICIENCY: ICD-10-CM

## 2023-01-23 DIAGNOSIS — M85.80 OSTEOPENIA AFTER MENOPAUSE: ICD-10-CM

## 2023-01-23 DIAGNOSIS — G89.29 CHRONIC PAIN OF LEFT ANKLE: ICD-10-CM

## 2023-01-23 PROCEDURE — 99214 OFFICE O/P EST MOD 30 MIN: CPT | Mod: PBBFAC,25 | Performed by: NURSE PRACTITIONER

## 2023-01-23 PROCEDURE — 1159F PR MEDICATION LIST DOCUMENTED IN MEDICAL RECORD: ICD-10-PCS | Mod: CPTII,,, | Performed by: NURSE PRACTITIONER

## 2023-01-23 PROCEDURE — 1126F AMNT PAIN NOTED NONE PRSNT: CPT | Mod: CPTII,,, | Performed by: NURSE PRACTITIONER

## 2023-01-23 PROCEDURE — 1126F PR PAIN SEVERITY QUANTIFIED, NO PAIN PRESENT: ICD-10-PCS | Mod: CPTII,,, | Performed by: NURSE PRACTITIONER

## 2023-01-23 PROCEDURE — 3288F PR FALLS RISK ASSESSMENT DOCUMENTED: ICD-10-PCS | Mod: CPTII,,, | Performed by: NURSE PRACTITIONER

## 2023-01-23 PROCEDURE — 1159F MED LIST DOCD IN RCRD: CPT | Mod: CPTII,,, | Performed by: NURSE PRACTITIONER

## 2023-01-23 PROCEDURE — 1101F PT FALLS ASSESS-DOCD LE1/YR: CPT | Mod: CPTII,,, | Performed by: NURSE PRACTITIONER

## 2023-01-23 PROCEDURE — 3288F FALL RISK ASSESSMENT DOCD: CPT | Mod: CPTII,,, | Performed by: NURSE PRACTITIONER

## 2023-01-23 PROCEDURE — 3078F DIAST BP <80 MM HG: CPT | Mod: CPTII,,, | Performed by: NURSE PRACTITIONER

## 2023-01-23 PROCEDURE — 90694 VACC AIIV4 NO PRSRV 0.5ML IM: CPT | Mod: PBBFAC

## 2023-01-23 PROCEDURE — 1160F RVW MEDS BY RX/DR IN RCRD: CPT | Mod: CPTII,,, | Performed by: NURSE PRACTITIONER

## 2023-01-23 PROCEDURE — 3074F SYST BP LT 130 MM HG: CPT | Mod: CPTII,,, | Performed by: NURSE PRACTITIONER

## 2023-01-23 PROCEDURE — 1160F PR REVIEW ALL MEDS BY PRESCRIBER/CLIN PHARMACIST DOCUMENTED: ICD-10-PCS | Mod: CPTII,,, | Performed by: NURSE PRACTITIONER

## 2023-01-23 PROCEDURE — 99215 PR OFFICE/OUTPT VISIT, EST, LEVL V, 40-54 MIN: ICD-10-PCS | Mod: S$PBB,,, | Performed by: NURSE PRACTITIONER

## 2023-01-23 PROCEDURE — 1101F PR PT FALLS ASSESS DOC 0-1 FALLS W/OUT INJ PAST YR: ICD-10-PCS | Mod: CPTII,,, | Performed by: NURSE PRACTITIONER

## 2023-01-23 PROCEDURE — 99215 OFFICE O/P EST HI 40 MIN: CPT | Mod: S$PBB,,, | Performed by: NURSE PRACTITIONER

## 2023-01-23 PROCEDURE — 3074F PR MOST RECENT SYSTOLIC BLOOD PRESSURE < 130 MM HG: ICD-10-PCS | Mod: CPTII,,, | Performed by: NURSE PRACTITIONER

## 2023-01-23 PROCEDURE — 3078F PR MOST RECENT DIASTOLIC BLOOD PRESSURE < 80 MM HG: ICD-10-PCS | Mod: CPTII,,, | Performed by: NURSE PRACTITIONER

## 2023-01-23 PROCEDURE — G0008 ADMIN INFLUENZA VIRUS VAC: HCPCS | Mod: PBBFAC

## 2023-01-23 RX ORDER — TRIAMTERENE AND HYDROCHLOROTHIAZIDE 37.5; 25 MG/1; MG/1
1 CAPSULE ORAL DAILY
Qty: 90 CAPSULE | Refills: 1 | Status: SHIPPED | OUTPATIENT
Start: 2023-01-23 | End: 2023-07-26 | Stop reason: SDUPTHER

## 2023-01-23 RX ORDER — DICLOFENAC SODIUM 50 MG/1
50 TABLET, DELAYED RELEASE ORAL 2 TIMES DAILY PRN
Qty: 45 TABLET | Refills: 0 | Status: SHIPPED | OUTPATIENT
Start: 2023-01-23 | End: 2023-07-26 | Stop reason: SDUPTHER

## 2023-01-23 RX ORDER — AMLODIPINE BESYLATE 10 MG/1
10 TABLET ORAL DAILY
Qty: 90 TABLET | Refills: 1 | Status: SHIPPED | OUTPATIENT
Start: 2023-01-23 | End: 2023-07-26 | Stop reason: SDUPTHER

## 2023-01-23 RX ORDER — FAMOTIDINE 20 MG/1
20 TABLET, FILM COATED ORAL NIGHTLY PRN
Qty: 30 TABLET | Refills: 2 | Status: SHIPPED | OUTPATIENT
Start: 2023-01-23 | End: 2024-01-31 | Stop reason: SDUPTHER

## 2023-01-23 RX ORDER — DICLOFENAC SODIUM 10 MG/G
2 GEL TOPICAL 3 TIMES DAILY PRN
Qty: 100 G | Refills: 1 | Status: SHIPPED | OUTPATIENT
Start: 2023-01-23 | End: 2023-07-26 | Stop reason: SDUPTHER

## 2023-01-23 RX ORDER — SIMVASTATIN 40 MG/1
40 TABLET, FILM COATED ORAL NIGHTLY
Qty: 90 TABLET | Refills: 1 | Status: SHIPPED | OUTPATIENT
Start: 2023-01-23 | End: 2023-07-26 | Stop reason: SDUPTHER

## 2023-01-23 RX ORDER — BENAZEPRIL HYDROCHLORIDE 40 MG/1
40 TABLET ORAL DAILY
Qty: 90 TABLET | Refills: 1 | Status: SHIPPED | OUTPATIENT
Start: 2023-01-23 | End: 2023-07-26 | Stop reason: SDUPTHER

## 2023-01-23 RX ADMIN — INFLUENZA A VIRUS A/VICTORIA/2570/2019 IVR-215 (H1N1) ANTIGEN (FORMALDEHYDE INACTIVATED), INFLUENZA A VIRUS A/DARWIN/6/2021 IVR-227 (H3N2) ANTIGEN (FORMALDEHYDE INACTIVATED), INFLUENZA B VIRUS B/AUSTRIA/1359417/2021 BVR-26 ANTIGEN (FORMALDEHYDE INACTIVATED), INFLUENZA B VIRUS B/PHUKET/3073/2013 BVR-1B ANTIGEN (FORMALDEHYDE INACTIVATED) 0.5 ML: 15; 15; 15; 15 INJECTION, SUSPENSION INTRAMUSCULAR at 08:01

## 2023-01-23 NOTE — PROGRESS NOTES
Patient, Leda Gallardo (MRN #[id number]), presented with a recorded BMI of 35.53 kg/m^2 and a documented comorbidity(s):  - Hypertension  - Hyperlipidemia  to which the severe obesity is a contributing factor. This is consistent with the definition of severe obesity (BMI 35.0-39.9) with comorbidity (ICD-10 E66.01, Z68.35). The patient's severe obesity was monitored, evaluated, addressed and/or treated. This addendum to the medical record is made on 01/23/2023.

## 2023-01-23 NOTE — PROGRESS NOTES
Internal Medicine Clinic  [facility]     Patient Name: Leda Gallardo   : 1935  MRN:[id number]     CC:  Chief Complaint   Patient presents with    Follow-up     Lab results        HPI  87 year old AAF, presents in clinic with granddaughter for routine lab f/u. Reports left ankle pain, chronic without swelling. 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 Nonsmoker. On diclofenac prn for chronic david foot/toe /ankle pain. Not taking diclofenac currently. Pain to left great toe only with walking. Denies chest pain, shortness of breath at rest, cough, fever, headache, dizziness, weakness, abdominal pain, nausea, vomiting, diarrhea, constipation, edema, dysuria, depression, anxiety.  Pneumo vac UTD.    Following UROLOGY at [facility] for bosniak IIF or III lesion in L superior pole.  Asymptomatic. The Urologist recommended refer to [facility] for surgical eval.  She continues to refuse urology referral to [facility].  Surveillance with CT/MRI regardless every 6 months.      Family history of primary malignant neoplasm of breast: Mother and Sister  MMG 2019 BIRADS 1; scheduled 2021;no showed and declines MMG in future     ECHO  EF 65%  ECHO  EF 60-65%  2021:coronary angiogram  Coronary stenosis:  discussed the option of CABG versus high risk PCI versus medical management.  Started on Imdur 20 mg p.o. daily; no longer taking. Missed f/u cardio apt and advised to reschedule.        Mild aortic stenosis/mild aortic regurgitation per TTE 8.12.20  LVEF -60-65% (TTE 8.12.20)     Abnormal nuclear stress test  Chest pain/DONAHUE  Lexiscan stress test -Apical defect, medium size, moderate intensity, partially reversible (1..21)  LVEF-60-65%  (TTE 8.12.20)     CT abd with contrast 2021;Enhancing mass again seen involving the posterior aspect of the  superior pole of the left kidney. While this could represent a  neoplasm, it has not changed in size or  appearance when compared to  the exam from 08/30/2019.        ROS  Review of Systems   Constitutional: Negative.    HENT: Negative.     Eyes: Negative.    Respiratory: Negative.     Cardiovascular: Negative.    Gastrointestinal: Negative.    Endocrine: Negative.    Genitourinary: Negative.    Musculoskeletal: Negative.    Integumentary:  Negative.   Allergic/Immunologic: Negative.    Neurological: Negative.    Hematological: Negative.    Psychiatric/Behavioral: Negative.     All other systems reviewed and are negative.     Physical Examination:  Vitals:    01/23/23 0826   BP: 129/62   Pulse: 60   Resp: 16   Temp: 97.7 °F (36.5 °C)          Physical Exam  Vitals and nursing note reviewed.   Constitutional:       Appearance: Normal appearance.   HENT:      Head: Normocephalic and atraumatic.      Right Ear: Tympanic membrane, ear canal and external ear normal.      Left Ear: Tympanic membrane, ear canal and external ear normal.      Nose: Nose normal.      Mouth/Throat:      Mouth: Mucous membranes are moist.      Pharynx: Oropharynx is clear.   Eyes:      Extraocular Movements: Extraocular movements intact.      Conjunctiva/sclera: Conjunctivae normal.      Pupils: Pupils are equal, round, and reactive to light.   Cardiovascular:      Rate and Rhythm: Normal rate and regular rhythm.      Pulses: Normal pulses.      Heart sounds: Normal heart sounds.   Pulmonary:      Effort: Pulmonary effort is normal.      Breath sounds: Normal breath sounds.   Abdominal:      General: Abdomen is flat. Bowel sounds are normal.      Palpations: Abdomen is soft.   Musculoskeletal:         General: Normal range of motion.      Cervical back: Normal range of motion and neck supple.   Skin:     General: Skin is warm and dry.      Capillary Refill: Capillary refill takes less than 2 seconds.   Neurological:      General: No focal deficit present.      Mental Status: She is alert and oriented to person, place, and time. Mental status is at  baseline.   Psychiatric:         Mood and Affect: Mood normal.         Behavior: Behavior normal.         Thought Content: Thought content normal.         Judgment: Judgment normal.          Labs/Imaging:    Chemistry:  Lab Results   Component Value Date     01/11/2023    K 4.0 01/11/2023    CHLORIDE 102 01/11/2023    BUN 29.4 (H) 01/11/2023    CREATININE 0.86 01/11/2023    EGFRNORACEVR >60 01/11/2023    GLUCOSE 107 01/11/2023    CALCIUM 9.2 01/11/2023    ALKPHOS 64 01/11/2023    LABPROT 6.9 01/11/2023    ALBUMIN 3.7 01/11/2023    BILIDIR 0.4 05/06/2022    IBILI 0.50 05/06/2022    AST 18 01/11/2023    ALT 15 01/11/2023    OIVBNORJ48IK 42.2 01/11/2023        No results found for: HGBA1C, MICROALBCREA     Hematology:  Lab Results   Component Value Date    WBC 7.3 01/11/2023    HGB 10.7 (L) 01/11/2023    HCT 35.8 (L) 01/11/2023     01/11/2023       Lipid Panel:  Lab Results   Component Value Date    CHOL 143 01/11/2023    HDL 46 01/11/2023    LDL 78.00 01/11/2023    TRIG 96 01/11/2023    TOTALCHOLEST 3 01/11/2023        Urine:  Lab Results   Component Value Date    COLORUA Yellow 09/19/2022    APPEARANCEUA Clear 09/19/2022    SGUA 1.015 09/19/2022    PHUA 6.5 09/19/2022    PROTEINUA Negative 09/19/2022    GLUCOSEUA Negative 09/19/2022    KETONESUA Negative 09/19/2022    BLOODUA Small (A) 09/19/2022    NITRITESUA Negative 09/19/2022    LEUKOCYTESUR Negative 09/19/2022    RBCUA 0-5 09/19/2022    WBCUA None Seen 09/19/2022    BACTERIA None Seen 09/19/2022        Assessment/Plan:  1. Hyperlipidemia, unspecified hyperlipidemia type  - CBC Auto Differential; Future  - Comprehensive Metabolic Panel; Future  - Lipid Panel; Future  - TSH; Future  - Urinalysis; Future  - Urinalysis  Lab Results   Component Value Date    LDL 78.00 01/11/2023    HDL 46 01/11/2023    TRIG 96 01/11/2023       Cont RX daily; refills given. Take Omega 3 daily.   Stressed importance of dietary modifications. Follow a low cholesterol, low  saturated fat diet with less that 200mg of cholesterol a day.  Avoid fried foods and high saturated fats (high saturated fats less than 7% of calories).  Add Flax Seed/Fish Oil supplements to diet. Increase dietary fiber.  Regular exercise can reduce LDL and raise HDL. Stressed importance of physical activity 5 times per week for 30 minutes per day.      2. Hypertension, unspecified type  - CBC Auto Differential; Future  - Comprehensive Metabolic Panel; Future  - Lipid Panel; Future  - TSH; Future  - Urinalysis; Future  - Urinalysis  BP at goal.  Med refills.  DASH diet: Eat more fruits, vegetables, and low fat dairy foods.  (Less than 2 grams of sodium per day).  Maintain healthy weight with goal BMI <30.   Exercise 30 minutes per day 5 days per week.  Home medications refilled and continued.   Home BP monitoring encouraged with BP parameters given.     3. Chronic pain of left ankle  - Uric Acid; Future  - diclofenac sodium (VOLTAREN) 1 % Gel; Apply 2 g topically 3 (three) times daily as needed (ankle pain).  Dispense: 100 g; Refill: 1    Stay active. Having strong muscles takes the strain off of your joints, which can help reduce your pain.  Rest for several minutes when your pain is at its worst.  Goal BMI <30.   Alternate hot and cold packs as needed for pain relief.     4. 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 Avoid smoking, no more than 2 ETOH drinks per day, limit caffeine to 1 serving per day, adequate protein intake, regular weight bearing/muscle strengthening exercise as tolerated.    Examples of weight bearing exercises include; walking, hiking, jogging, climbing stairs, playing tennis, and dancing.     5. Renal mass  Keep URO apt as scheduled this month    6. Obesity, unspecified classification, unspecified obesity type, unspecified whether serious comorbidity present  Goal BMI <30.  Exercise 5 times a week for 30  minutes per day.  Avoid soda, simple sugars, excessive rice, potatoes or bread. Limit fast foods and fried foods.  Choose complex carbs in moderation (example: green vegetables, beans, oatmeal). Eat plenty of fresh fruits and vegetables with lean meats daily.  Do not skip meals. Eat a balanced portion size.  Avoid fad diets. Consider permanent healthy life style changes.     7. Vitamin D deficiency  - Vitamin D; Future  Vitamin D level reviewed and is currently at goal, between 30-80 ng/mL. Continue OTC Vitamin D3 2000 IU daily.     8. Need for influenza vaccination  - influenza 65up-adj (QUADRIVALENT ADJUVANTED PF) vaccine 0.5 mL   Admin today      Medication List with Changes/Refills   New Medications    DICLOFENAC SODIUM (VOLTAREN) 1 % GEL    Apply 2 g topically 3 (three) times daily as needed (ankle pain).   Current Medications    MAGNESIUM HYDROXIDE (MILK OF MAGNESIA ORAL)    Take by mouth.   Changed and/or Refilled Medications    Modified Medication Previous Medication    AMLODIPINE (NORVASC) 10 MG TABLET amLODIPine (NORVASC) 10 MG tablet       Take 1 tablet (10 mg total) by mouth once daily.    Take 1 tablet (10 mg total) by mouth once daily.    BENAZEPRIL (LOTENSIN) 40 MG TABLET benazepriL (LOTENSIN) 40 MG tablet       Take 1 tablet (40 mg total) by mouth once daily.    Take 1 tablet (40 mg total) by mouth once daily.    DICLOFENAC (VOLTAREN) 50 MG EC TABLET diclofenac (VOLTAREN) 50 MG EC tablet       Take 1 tablet (50 mg total) by mouth 2 (two) times daily as needed (pain).    Take 1 tablet (50 mg total) by mouth 2 (two) times daily as needed (pain).    FAMOTIDINE (PEPCID) 20 MG TABLET famotidine (PEPCID) 20 MG tablet       Take 1 tablet (20 mg total) by mouth nightly as needed for Heartburn.    Take 1 tablet (20 mg total) by mouth nightly as needed for Heartburn.    SIMVASTATIN (ZOCOR) 40 MG TABLET simvastatin (ZOCOR) 40 MG tablet       Take 1 tablet (40 mg total) by mouth every evening.    Take 1 tablet  (40 mg total) by mouth every evening.    TRIAMTERENE-HYDROCHLOROTHIAZIDE 37.5-25 MG (DYAZIDE) 37.5-25 MG PER CAPSULE triamterene-hydrochlorothiazide 37.5-25 mg (DYAZIDE) 37.5-25 mg per capsule       Take 1 capsule by mouth once daily.    Take 1 capsule by mouth once daily.   Discontinued Medications    FLUTICASONE PROPIONATE (FLONASE) 50 MCG/ACTUATION NASAL SPRAY    1 spray (50 mcg total) by Each Nostril route once daily.        Orders Placed This Encounter   Procedures    DXA Bone Density Spine And Hip    CBC Auto Differential    Comprehensive Metabolic Panel    Lipid Panel    TSH    Urinalysis    Vitamin D    Uric Acid         Future Appointments   Date Time Provider Department Center   1[facility]30[facility]2023 10:[facility] [place]    7/24/2023  8:00 AM NHUNG Albarado [facility]        Labs thoroughly reviewed with patient. Medication refills addressed today.  RTC prn and 6 months, with labs 1 week prior to the apt.  COVID 19 precautions given to patient.  Patient voices understanding of all discharge instructions.

## 2023-01-27 ENCOUNTER — TELEPHONE (OUTPATIENT)
Dept: UROLOGY | Facility: CLINIC | Age: 88
End: 2023-01-27
Payer: MEDICARE

## 2023-01-27 NOTE — TELEPHONE ENCOUNTER
Thank you!    ----- Message from Arnulfo Tatum sent at 1/27/2023  2:39 PM CST -----  Regarding: RE: Reschedule Urology Appt  Appt rescheduled. Pt knows to r/s CT  ----- Message -----  From: Rea Lozano RN  Sent: 1/27/2023   2:24 PM CST  To: Arnulfo Tatum  Subject: Reschedule Urology Appt                          Pt has appt on 30Jan2023 but did not get CT done. Please reschedule appt & remind pt to get CT done. Thanks!

## 2023-01-31 ENCOUNTER — HOSPITAL ENCOUNTER (OUTPATIENT)
Dept: RADIOLOGY | Facility: HOSPITAL | Age: 88
Discharge: HOME | End: 2023-01-31
Attending: NURSE PRACTITIONER
Payer: MEDICARE

## 2023-01-31 DIAGNOSIS — M85.80 OSTEOPENIA AFTER MENOPAUSE: ICD-10-CM

## 2023-01-31 DIAGNOSIS — Z78.0 OSTEOPENIA AFTER MENOPAUSE: ICD-10-CM

## 2023-01-31 PROCEDURE — 77080 DXA BONE DENSITY AXIAL: CPT | Mod: TC

## 2023-03-09 ENCOUNTER — TELEPHONE (OUTPATIENT)
Dept: UROLOGY | Facility: CLINIC | Age: 88
End: 2023-03-09
Payer: MEDICAID

## 2023-03-09 NOTE — TELEPHONE ENCOUNTER
Thank you!    ----- Message from Arnulfo Tatum sent at 3/9/2023 10:18 AM CST -----  Regarding: RE: Reschedule Urology Appt  Spoke w/ pt, appt rescheduled  ----- Message -----  From: Rea Lozano RN  Sent: 3/9/2023  10:04 AM CST  To: Arnulfo Tatum  Subject: Reschedule Urology Appt                          Pt has appt on 10Mar2023 but did not get CT done. This will be 2nd reschedule due to CT not being completed. Please reschedule appt & remind pt to get CT done. Thanks!

## 2023-03-20 ENCOUNTER — HOSPITAL ENCOUNTER (OUTPATIENT)
Dept: RADIOLOGY | Facility: HOSPITAL | Age: 88
Discharge: HOME | End: 2023-03-20
Attending: UROLOGY
Payer: MEDICARE

## 2023-03-20 DIAGNOSIS — N28.89 OTHER SPECIFIED DISORDERS OF KIDNEY AND URETER: ICD-10-CM

## 2023-03-20 LAB
CREAT SERPL-MCNC: 0.99 MG/DL (ref 0.55–1.02)
GFR SERPLBLD CREATININE-BSD FMLA CKD-EPI: 56 MLS/MIN/1.73/M2

## 2023-03-20 PROCEDURE — 25500020 PHARM REV CODE 255: Performed by: UROLOGY

## 2023-03-20 PROCEDURE — 74170 CT ABD WO CNTRST FLWD CNTRST: CPT | Mod: TC

## 2023-03-20 PROCEDURE — 82565 ASSAY OF CREATININE: CPT | Performed by: UROLOGY

## 2023-03-20 RX ADMIN — IOHEXOL 100 ML: 350 INJECTION, SOLUTION INTRAVENOUS at 10:03

## 2023-03-23 ENCOUNTER — HOSPITAL ENCOUNTER (EMERGENCY)
Facility: HOSPITAL | Age: 88
Discharge: HOME | End: 2023-03-23
Attending: STUDENT IN AN ORGANIZED HEALTH CARE EDUCATION/TRAINING PROGRAM
Payer: MEDICARE

## 2023-03-23 VITALS
RESPIRATION RATE: 24 BRPM | TEMPERATURE: 98 F | HEART RATE: 62 BPM | DIASTOLIC BLOOD PRESSURE: 64 MMHG | SYSTOLIC BLOOD PRESSURE: 132 MMHG | OXYGEN SATURATION: 96 %

## 2023-03-23 DIAGNOSIS — R42 DIZZY: ICD-10-CM

## 2023-03-23 DIAGNOSIS — H81.10 BENIGN PAROXYSMAL POSITIONAL VERTIGO, UNSPECIFIED LATERALITY: Primary | ICD-10-CM

## 2023-03-23 LAB
ALBUMIN SERPL-MCNC: 3.9 G/DL (ref 3.4–4.8)
ALBUMIN/GLOB SERPL: 1 RATIO (ref 1.1–2)
ALP SERPL-CCNC: 68 UNIT/L (ref 40–150)
ALT SERPL-CCNC: 14 UNIT/L (ref 0–55)
APPEARANCE UR: CLEAR
AST SERPL-CCNC: 19 UNIT/L (ref 5–34)
BACTERIA #/AREA URNS AUTO: ABNORMAL /HPF
BASOPHILS # BLD AUTO: 0.04 X10(3)/MCL (ref 0–0.2)
BASOPHILS NFR BLD AUTO: 0.4 %
BILIRUB UR QL STRIP.AUTO: NEGATIVE MG/DL
BILIRUBIN DIRECT+TOT PNL SERPL-MCNC: 0.8 MG/DL
BUN SERPL-MCNC: 29.2 MG/DL (ref 9.8–20.1)
CALCIUM SERPL-MCNC: 9.6 MG/DL (ref 8.4–10.2)
CHLORIDE SERPL-SCNC: 104 MMOL/L (ref 98–107)
CO2 SERPL-SCNC: 27 MMOL/L (ref 23–31)
COLOR UR AUTO: YELLOW
CREAT SERPL-MCNC: 0.91 MG/DL (ref 0.55–1.02)
EOSINOPHIL # BLD AUTO: 0.69 X10(3)/MCL (ref 0–0.9)
EOSINOPHIL NFR BLD AUTO: 6.9 %
ERYTHROCYTE [DISTWIDTH] IN BLOOD BY AUTOMATED COUNT: 13.6 % (ref 11.5–17)
GFR SERPLBLD CREATININE-BSD FMLA CKD-EPI: >60 MLS/MIN/1.73/M2
GLOBULIN SER-MCNC: 3.8 GM/DL (ref 2.4–3.5)
GLUCOSE SERPL-MCNC: 135 MG/DL (ref 82–115)
GLUCOSE UR QL STRIP.AUTO: NEGATIVE MG/DL
HCT VFR BLD AUTO: 35.1 % (ref 37–47)
HGB BLD-MCNC: 11 G/DL (ref 12–16)
IMM GRANULOCYTES # BLD AUTO: 0.02 X10(3)/MCL (ref 0–0.04)
IMM GRANULOCYTES NFR BLD AUTO: 0.2 %
KETONES UR QL STRIP.AUTO: NEGATIVE MG/DL
LEUKOCYTE ESTERASE UR QL STRIP.AUTO: NEGATIVE UNIT/L
LYMPHOCYTES # BLD AUTO: 2.63 X10(3)/MCL (ref 0.6–4.6)
LYMPHOCYTES NFR BLD AUTO: 26.3 %
MAGNESIUM SERPL-MCNC: 2.1 MG/DL (ref 1.6–2.6)
MCH RBC QN AUTO: 29 PG
MCHC RBC AUTO-ENTMCNC: 31.3 G/DL (ref 33–36)
MCV RBC AUTO: 92.6 FL (ref 80–94)
MONOCYTES # BLD AUTO: 0.59 X10(3)/MCL (ref 0.1–1.3)
MONOCYTES NFR BLD AUTO: 5.9 %
NEUTROPHILS # BLD AUTO: 6.04 X10(3)/MCL (ref 2.1–9.2)
NEUTROPHILS NFR BLD AUTO: 60.3 %
NITRITE UR QL STRIP.AUTO: NEGATIVE
PH UR STRIP.AUTO: 5.5 [PH]
PLATELET # BLD AUTO: 315 X10(3)/MCL (ref 130–400)
PMV BLD AUTO: 9.8 FL (ref 7.4–10.4)
POC CARDIAC TROPONIN I: 0.02 NG/ML (ref 0–0.08)
POTASSIUM SERPL-SCNC: 3.7 MMOL/L (ref 3.5–5.1)
PROT SERPL-MCNC: 7.7 GM/DL (ref 5.8–7.6)
PROT UR QL STRIP.AUTO: NEGATIVE MG/DL
RBC # BLD AUTO: 3.79 X10(6)/MCL (ref 4.2–5.4)
RBC #/AREA URNS AUTO: ABNORMAL /HPF
RBC UR QL AUTO: ABNORMAL UNIT/L
SAMPLE: NORMAL
SODIUM SERPL-SCNC: 141 MMOL/L (ref 136–145)
SP GR UR STRIP.AUTO: 1.01
SQUAMOUS #/AREA URNS AUTO: ABNORMAL /HPF
UROBILINOGEN UR STRIP-ACNC: 0.2 MG/DL
WBC # SPEC AUTO: 10 X10(3)/MCL (ref 4.5–11.5)
WBC #/AREA URNS AUTO: ABNORMAL /HPF

## 2023-03-23 PROCEDURE — 85025 COMPLETE CBC W/AUTO DIFF WBC: CPT | Performed by: STUDENT IN AN ORGANIZED HEALTH CARE EDUCATION/TRAINING PROGRAM

## 2023-03-23 PROCEDURE — 93010 ELECTROCARDIOGRAM REPORT: CPT | Mod: ,,, | Performed by: INTERNAL MEDICINE

## 2023-03-23 PROCEDURE — 93005 ELECTROCARDIOGRAM TRACING: CPT

## 2023-03-23 PROCEDURE — 83735 ASSAY OF MAGNESIUM: CPT | Performed by: STUDENT IN AN ORGANIZED HEALTH CARE EDUCATION/TRAINING PROGRAM

## 2023-03-23 PROCEDURE — 99284 EMERGENCY DEPT VISIT MOD MDM: CPT

## 2023-03-23 PROCEDURE — 81001 URINALYSIS AUTO W/SCOPE: CPT | Performed by: STUDENT IN AN ORGANIZED HEALTH CARE EDUCATION/TRAINING PROGRAM

## 2023-03-23 PROCEDURE — 80053 COMPREHEN METABOLIC PANEL: CPT | Performed by: STUDENT IN AN ORGANIZED HEALTH CARE EDUCATION/TRAINING PROGRAM

## 2023-03-23 PROCEDURE — 93010 EKG 12-LEAD: ICD-10-PCS | Mod: ,,, | Performed by: INTERNAL MEDICINE

## 2023-03-23 PROCEDURE — 25000003 PHARM REV CODE 250: Performed by: STUDENT IN AN ORGANIZED HEALTH CARE EDUCATION/TRAINING PROGRAM

## 2023-03-23 RX ORDER — MECLIZINE HYDROCHLORIDE 25 MG/1
25 TABLET ORAL 3 TIMES DAILY PRN
Qty: 10 TABLET | Refills: 0 | Status: SHIPPED | OUTPATIENT
Start: 2023-03-23

## 2023-03-23 RX ORDER — ONDANSETRON 4 MG/1
4 TABLET, ORALLY DISINTEGRATING ORAL
Status: COMPLETED | OUTPATIENT
Start: 2023-03-23 | End: 2023-03-23

## 2023-03-23 RX ORDER — MECLIZINE HYDROCHLORIDE 25 MG/1
25 TABLET ORAL
Status: COMPLETED | OUTPATIENT
Start: 2023-03-23 | End: 2023-03-23

## 2023-03-23 RX ADMIN — MECLIZINE HYDROCHLORIDE 25 MG: 25 TABLET ORAL at 03:03

## 2023-03-23 RX ADMIN — ONDANSETRON 4 MG: 4 TABLET, ORALLY DISINTEGRATING ORAL at 03:03

## 2023-03-23 NOTE — ED PROVIDER NOTES
Encounter Date: 3/23/2023       History     Chief Complaint   Patient presents with    Dizziness     Patient c/o dizziness nausea no vomiting states felt like she could not get out of bed for 2 hours denies chest pain or SOB skin warm dry      Pt is a 86 yo AAF with a PMHx of HTN who presented to the ED today for intermittent dizziness.  Patient states this occurred years ago but resolved on its own.  Patient states that she went to rise from her bed to go to the restroom and noticed that she was dizzy at that point.  Patient states was associated with nausea but without any ataxia.  Patient states it is not present at rest.  Patient denies any diplopia, dysarthria, dysphagia, focal deficits, changes in sensation, facial asymmetry or drooling.  Patient states she does not have a headache.  Patient denies any ear pain or tinnitus.  Patient denies any fevers, chills, cough, congestion, chest pain, shortness of breath abdominal pain.    Review of patient's allergies indicates:  No Known Allergies  Past Medical History:   Diagnosis Date    Essential (primary) hypertension     Heart disease      Past Surgical History:   Procedure Laterality Date    HYSTERECTOMY      LEFT HEART CATHETERIZATION  06/11/2021     Family History   Problem Relation Age of Onset    Breast cancer Mother     Breast cancer Sister      Social History     Tobacco Use    Smoking status: Never    Smokeless tobacco: Never   Substance Use Topics    Alcohol use: Never    Drug use: Never     Review of Systems   Constitutional:  Negative for chills, fatigue and fever.   HENT:  Negative for congestion, sore throat and trouble swallowing.    Eyes:  Negative for pain and visual disturbance.   Respiratory:  Negative for cough, shortness of breath and wheezing.    Cardiovascular:  Negative for chest pain and palpitations.   Gastrointestinal:  Positive for nausea. Negative for abdominal pain, blood in stool, constipation, diarrhea and vomiting.   Genitourinary:   Negative for dysuria and hematuria.   Musculoskeletal:  Negative for back pain and myalgias.   Skin:  Negative for rash and wound.   Neurological:  Positive for dizziness. Negative for tremors, seizures, syncope, facial asymmetry, speech difficulty, weakness, light-headedness, numbness and headaches.   Psychiatric/Behavioral:  Negative for confusion. The patient is not nervous/anxious.      Physical Exam     Initial Vitals [03/23/23 0307]   BP Pulse Resp Temp SpO2   (!) 168/73 70 16 98 °F (36.7 °C) 96 %      MAP       --         Physical Exam    Nursing note and vitals reviewed.  Constitutional: She appears well-developed and well-nourished. She is not diaphoretic. No distress.   HENT:   Head: Normocephalic.   Right Ear: External ear normal.   Left Ear: External ear normal.   Nose: Nose normal.   Ears:  TMs intact no erythema and positive light reflex bilaterally.   Eyes: Conjunctivae and EOM are normal. Right eye exhibits no discharge. Left eye exhibits no discharge. No scleral icterus.   Neck:   Normal range of motion.  Cardiovascular:  Normal rate, regular rhythm and normal heart sounds.     Exam reveals no gallop and no friction rub.       No murmur heard.  Pulmonary/Chest: Breath sounds normal. No stridor. No respiratory distress. She has no wheezes. She has no rhonchi. She has no rales.   Abdominal: Abdomen is soft. She exhibits no distension. There is no abdominal tenderness. There is no rebound and no guarding.   Musculoskeletal:         General: Normal range of motion.      Cervical back: Normal range of motion.     Neurological: She is alert and oriented to person, place, and time. She has normal strength. No cranial nerve deficit or sensory deficit. GCS score is 15. GCS eye subscore is 4. GCS verbal subscore is 5. GCS motor subscore is 6.   CN II-XII intact bilaterally, PERRLA, EOMI, AO, no facial asymmetry noted, no abnormalities of vision loss or peripheral vision loss, strength 5/5 x 4 extremities,  sensation intact throughout, no focal neurological deficits noted on exam.  Gait stable without assistance. Negative Pronator drift bilaterally.       Skin: Skin is warm. No rash noted. No erythema.   Psychiatric: She has a normal mood and affect. Her behavior is normal.       ED Course   Procedures  Labs Reviewed   COMPREHENSIVE METABOLIC PANEL - Abnormal; Notable for the following components:       Result Value    Glucose Level 135 (*)     Blood Urea Nitrogen 29.2 (*)     Protein Total 7.7 (*)     Globulin 3.8 (*)     Albumin/Globulin Ratio 1.0 (*)     All other components within normal limits   URINALYSIS, REFLEX TO URINE CULTURE - Abnormal; Notable for the following components:    Blood, UA Moderate (*)     All other components within normal limits   CBC WITH DIFFERENTIAL - Abnormal; Notable for the following components:    RBC 3.79 (*)     Hgb 11.0 (*)     Hct 35.1 (*)     MCHC 31.3 (*)     All other components within normal limits   URINALYSIS, MICROSCOPIC - Abnormal; Notable for the following components:    Squamous Epithelial Cells, UA Few (*)     All other components within normal limits   MAGNESIUM - Normal   CBC W/ AUTO DIFFERENTIAL    Narrative:     The following orders were created for panel order CBC Auto Differential.  Procedure                               Abnormality         Status                     ---------                               -----------         ------                     CBC with Differential[[id number]]        Abnormal            Final result                 Please view results for these tests on the individual orders.   TROPONIN ISTAT   POCT TROPONIN     EKG Readings: (Independently Interpreted)   Initial Reading: No STEMI. Rhythm: Normal Sinus Rhythm. Heart Rate: 70. Ectopy: No Ectopy. Conduction: Normal. ST Segments: Normal ST Segments. T Waves: Normal. Axis: Normal.   ECG Results              EKG 12-lead (In process)  Result time 03/23/23 04:09:58      In process by Interface,  Lab In [facility] (03/23/23 04:09:58)                   Narrative:    Test Reason : R42,    Vent. Rate : 070 BPM     Atrial Rate : 070 BPM     P-R Int : 206 ms          QRS Dur : 094 ms      QT Int : 378 ms       P-R-T Axes : 052 006 020 degrees     QTc Int : 408 ms    Normal sinus rhythm with sinus arrhythmia  Nonspecific ST and T wave abnormality  Abnormal ECG  When compared with ECG of 01-JUL-2022 12:12,  Nonspecific T wave abnormality no longer evident in Anterior leads    Referred By: [email or web address]   SELF           Confirmed By:                                   Imaging Results    None          Medications   meclizine tablet 25 mg (25 mg Oral Given 3/23/23 0315)   ondansetron disintegrating tablet 4 mg (4 mg Oral Given 3/23/23 0315)     Medical Decision Making:   Initial Assessment:   Overall well-appearing 87-year-old female  Differential Diagnosis:   Central versus peripheral dizziness  Clinical Tests:   Lab Tests: Ordered and Reviewed  The following lab test(s) were unremarkable: CBC, CMP, Urinalysis and Troponin  ED Management:  Vital signs stable patient is afebrile   Neuro exam is unremarkable   Denies any other red flag symptoms of dizziness   EKG showed no ischemic changes  All symptoms point to a peripheral cause   Also no upbeating/vertical nystagmus   Meclizine and Zofran given in the ER  Patient states all her dizziness seems this subside   Basic labs unremarkable  Urine studies unremarkable and troponin negative   Discussed vestibular exercises with both patient and daughter and they both agree  Meclizine sent pharmacy to be used as needed   Return precautions discussed and follow up with PCP is recommended                          Clinical Impression:   Final diagnoses:  [R42] Dizzy  [H81.10] Benign paroxysmal positional vertigo, unspecified laterality (Primary)        ED Disposition Condition    Discharge Stable          ED Prescriptions       Medication Sig Dispense Start Date End Date Auth. Provider     meclizine (ANTIVERT) 25 mg tablet Take 1 tablet (25 mg total) by mouth 3 (three) times daily as needed for Dizziness. 10 tablet 3/23/2023 -- Nacho Baumann MD          Follow-up Information       Follow up With Specialties Details Why Contact Info    [facility] - Emergency Dept Emergency Medicine  If symptoms worsen [address] [facility] [id number] [phone]    NHUNG Albarado Nurse Practitioner Schedule an appointment as soon as possible for a visit   [address] [facility] [address]  [phone]               Nacho Baumann MD  03/23/23 0507

## 2023-07-26 ENCOUNTER — OFFICE VISIT (OUTPATIENT)
Dept: INTERNAL MEDICINE | Facility: CLINIC | Age: 88
End: 2023-07-26
Payer: MEDICARE

## 2023-07-26 ENCOUNTER — TELEPHONE (OUTPATIENT)
Dept: INTERNAL MEDICINE | Facility: CLINIC | Age: 88
End: 2023-07-26

## 2023-07-26 VITALS
BODY MASS INDEX: 35.34 KG/M2 | TEMPERATURE: 98 F | RESPIRATION RATE: 16 BRPM | HEART RATE: 60 BPM | WEIGHT: 207 LBS | SYSTOLIC BLOOD PRESSURE: 136 MMHG | DIASTOLIC BLOOD PRESSURE: 60 MMHG | HEIGHT: 64 IN

## 2023-07-26 DIAGNOSIS — G89.29 CHRONIC PAIN OF LEFT ANKLE: ICD-10-CM

## 2023-07-26 DIAGNOSIS — M25.572 CHRONIC PAIN OF LEFT ANKLE: ICD-10-CM

## 2023-07-26 DIAGNOSIS — E78.5 HYPERLIPIDEMIA, UNSPECIFIED HYPERLIPIDEMIA TYPE: ICD-10-CM

## 2023-07-26 DIAGNOSIS — E66.9 OBESITY, UNSPECIFIED CLASSIFICATION, UNSPECIFIED OBESITY TYPE, UNSPECIFIED WHETHER SERIOUS COMORBIDITY PRESENT: ICD-10-CM

## 2023-07-26 DIAGNOSIS — I10 HYPERTENSION, UNSPECIFIED TYPE: ICD-10-CM

## 2023-07-26 DIAGNOSIS — E55.9 VITAMIN D DEFICIENCY: ICD-10-CM

## 2023-07-26 DIAGNOSIS — R35.0 INCREASED FREQUENCY OF URINATION: ICD-10-CM

## 2023-07-26 DIAGNOSIS — M10.9 GOUT, UNSPECIFIED CAUSE, UNSPECIFIED CHRONICITY, UNSPECIFIED SITE: ICD-10-CM

## 2023-07-26 PROCEDURE — 99214 OFFICE O/P EST MOD 30 MIN: CPT | Mod: S$PBB,,, | Performed by: NURSE PRACTITIONER

## 2023-07-26 PROCEDURE — 99213 OFFICE O/P EST LOW 20 MIN: CPT | Mod: PBBFAC | Performed by: NURSE PRACTITIONER

## 2023-07-26 PROCEDURE — 1160F RVW MEDS BY RX/DR IN RCRD: CPT | Mod: CPTII,,, | Performed by: NURSE PRACTITIONER

## 2023-07-26 PROCEDURE — 1159F PR MEDICATION LIST DOCUMENTED IN MEDICAL RECORD: ICD-10-PCS | Mod: CPTII,,, | Performed by: NURSE PRACTITIONER

## 2023-07-26 PROCEDURE — 1160F PR REVIEW ALL MEDS BY PRESCRIBER/CLIN PHARMACIST DOCUMENTED: ICD-10-PCS | Mod: CPTII,,, | Performed by: NURSE PRACTITIONER

## 2023-07-26 PROCEDURE — 1159F MED LIST DOCD IN RCRD: CPT | Mod: CPTII,,, | Performed by: NURSE PRACTITIONER

## 2023-07-26 PROCEDURE — 99214 PR OFFICE/OUTPT VISIT, EST, LEVL IV, 30-39 MIN: ICD-10-PCS | Mod: S$PBB,,, | Performed by: NURSE PRACTITIONER

## 2023-07-26 PROCEDURE — 1126F PR PAIN SEVERITY QUANTIFIED, NO PAIN PRESENT: ICD-10-PCS | Mod: CPTII,,, | Performed by: NURSE PRACTITIONER

## 2023-07-26 PROCEDURE — 1126F AMNT PAIN NOTED NONE PRSNT: CPT | Mod: CPTII,,, | Performed by: NURSE PRACTITIONER

## 2023-07-26 RX ORDER — TRIAMTERENE AND HYDROCHLOROTHIAZIDE 37.5; 25 MG/1; MG/1
1 CAPSULE ORAL DAILY
Qty: 90 CAPSULE | Refills: 1 | Status: SHIPPED | OUTPATIENT
Start: 2023-07-26 | End: 2024-01-31 | Stop reason: SDUPTHER

## 2023-07-26 RX ORDER — BENAZEPRIL HYDROCHLORIDE 40 MG/1
40 TABLET ORAL DAILY
Qty: 90 TABLET | Refills: 1 | Status: SHIPPED | OUTPATIENT
Start: 2023-07-26 | End: 2024-01-31 | Stop reason: SDUPTHER

## 2023-07-26 RX ORDER — DICLOFENAC SODIUM 50 MG/1
50 TABLET, DELAYED RELEASE ORAL 2 TIMES DAILY PRN
Qty: 45 TABLET | Refills: 1 | Status: SHIPPED | OUTPATIENT
Start: 2023-07-26 | End: 2024-01-31 | Stop reason: SDUPTHER

## 2023-07-26 RX ORDER — AMLODIPINE BESYLATE 10 MG/1
10 TABLET ORAL DAILY
Qty: 90 TABLET | Refills: 1 | Status: SHIPPED | OUTPATIENT
Start: 2023-07-26 | End: 2024-01-31 | Stop reason: SDUPTHER

## 2023-07-26 RX ORDER — ALLOPURINOL 100 MG/1
100 TABLET ORAL DAILY
Qty: 90 TABLET | Refills: 1 | Status: SHIPPED | OUTPATIENT
Start: 2023-07-26 | End: 2024-01-31 | Stop reason: SDUPTHER

## 2023-07-26 RX ORDER — SIMVASTATIN 40 MG/1
40 TABLET, FILM COATED ORAL NIGHTLY
Qty: 90 TABLET | Refills: 1 | Status: SHIPPED | OUTPATIENT
Start: 2023-07-26 | End: 2024-01-31 | Stop reason: SDUPTHER

## 2023-07-26 RX ORDER — DICLOFENAC SODIUM 10 MG/G
2 GEL TOPICAL 3 TIMES DAILY PRN
Qty: 100 G | Refills: 1 | Status: SHIPPED | OUTPATIENT
Start: 2023-07-26

## 2023-07-26 NOTE — PROGRESS NOTES
Internal Medicine Clinic  NHUNG Albarado     Patient Name: Leda Gallardo   : 1935  MRN:[id number]     Chief Complaint     Chief Complaint   Patient presents with    Follow-up     Lab review        History of Present Illness     88 year old AAF, presents in clinic with granddaughter for routine lab f/u.       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 Nonsmoker. On diclofenac prn for chronic david foot/toe /ankle pain. Not taking diclofenac currently. Pain to left great toe only with walking. Denies chest pain, shortness of breath at rest, cough, fever, headache, dizziness, weakness, abdominal pain, nausea, vomiting, diarrhea, constipation, edema, dysuria, depression, anxiety.    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  Asymptomatic. The Urologist recommended refer to [facility] for surgical eval.  She continues to refuse urology referral to [facility].  Surveillance with CT/MRI regardless every 6 months.      Family history of primary malignant neoplasm of breast: Mother and Sister  MMG 2019 BIRADS 1; scheduled 2021;no showed and declines MMG in future     ECHO  EF 65%  ECHO  EF 60-65%  2021:coronary angiogram  Coronary stenosis:  discussed the option of CABG versus high risk PCI versus medical management.  Started on Imdur 20 mg p.o. daily; no longer taking. Missed f/u cardio apt and advised to reschedule.        Mild aortic stenosis/mild aortic regurgitation per TTE 8.12.20  LVEF -60-65% (TTE 8.12.20)     Abnormal nuclear stress test  Chest pain/DONAHUE  Lexiscan stress test -Apical defect, medium size, moderate intensity, partially reversible (1.27.21)  LVEF-60-65%  (TTE 8.12.20)     CT abd with contrast 2021;Enhancing mass again seen involving the posterior aspect of the  superior pole of the left kidney. While this could represent a  neoplasm, it has not changed in size or appearance when compared  "to  the exam from 08/30/2019.        Review of Systems     Review of Systems   Constitutional: Negative.    HENT: Negative.     Eyes: Negative.    Respiratory: Negative.     Cardiovascular: Negative.    Gastrointestinal: Negative.    Endocrine: Negative.    Genitourinary: Negative.    Musculoskeletal: Negative.    Integumentary:  Negative.   Allergic/Immunologic: Negative.    Neurological: Negative.    Hematological: Negative.    Psychiatric/Behavioral: Negative.     All other systems reviewed and are negative.     Physical Examination     Visit Vitals  /60 (BP Location: Left arm, Patient Position: Sitting, BP Method: Large (Manual))   Pulse 60   Temp 98 °F (36.7 °C) (Oral)   Resp 16   Ht 5' 4" (1.626 m)   Wt 93.9 kg (207 lb)   BMI 35.53 kg/m²        BP Readings from Last 6 Encounters:   07/26/23 136/60   03/23/23 132/64   01/23/23 129/62   09/22/22 (!) 133/56   07/25/22 (!) 160/82   07/01/22 117/63   ]    Wt Readings from Last 6 Encounters:   07/26/23 93.9 kg (207 lb)   01/23/23 93.9 kg (207 lb)   09/22/22 91.9 kg (202 lb 9.6 oz)   07/25/22 90.9 kg (200 lb 6.4 oz)   07/01/22 92.1 kg (203 lb)   05/27/22 92.6 kg (204 lb 3.2 oz)   ]      Physical Exam  Vitals and nursing note reviewed.   Constitutional:       Appearance: Normal appearance.   HENT:      Head: Normocephalic and atraumatic.      Right Ear: Tympanic membrane, ear canal and external ear normal.      Left Ear: Tympanic membrane, ear canal and external ear normal.      Nose: Nose normal.      Mouth/Throat:      Mouth: Mucous membranes are moist.      Pharynx: Oropharynx is clear.   Eyes:      Extraocular Movements: Extraocular movements intact.      Conjunctiva/sclera: Conjunctivae normal.      Pupils: Pupils are equal, round, and reactive to light.   Cardiovascular:      Rate and Rhythm: Normal rate and regular rhythm.      Pulses: Normal pulses.      Heart sounds: Normal heart sounds.   Pulmonary:      Effort: Pulmonary effort is normal.      Breath " sounds: Normal breath sounds.   Abdominal:      General: Abdomen is flat. Bowel sounds are normal.      Palpations: Abdomen is soft.   Musculoskeletal:         General: Normal range of motion.      Cervical back: Normal range of motion and neck supple.   Skin:     General: Skin is warm and dry.      Capillary Refill: Capillary refill takes less than 2 seconds.   Neurological:      General: No focal deficit present.      Mental Status: She is alert and oriented to person, place, and time. Mental status is at baseline.   Psychiatric:         Mood and Affect: Mood normal.         Behavior: Behavior normal.         Thought Content: Thought content normal.         Judgment: Judgment normal.        Labs / Imaging     Chemistry:  Lab Results   Component Value Date     07/26/2023    K 3.6 07/26/2023    CHLORIDE 106 07/26/2023    BUN 29.4 (H) 07/26/2023    CREATININE 0.96 07/26/2023    EGFRNORACEVR 58 07/26/2023    GLUCOSE 101 07/26/2023    CALCIUM 9.5 07/26/2023    ALKPHOS 65 07/26/2023    LABPROT 7.4 07/26/2023    ALBUMIN 3.8 07/26/2023    BILIDIR 0.4 05/06/2022    IBILI 0.50 05/06/2022    AST 19 07/26/2023    ALT 11 07/26/2023    MG 2.10 03/23/2023    EYPKCNLH75XS 36.9 07/26/2023        No results found for: HGBA1C, MICROALBCREA     Hematology:  Lab Results   Component Value Date    WBC 7.60 07/26/2023    RBC 3.62 (L) 07/26/2023    HGB 10.8 (L) 07/26/2023    HCT 33.7 (L) 07/26/2023    MCV 93.1 07/26/2023    MCH 29.8 07/26/2023    MCHC 32.0 (L) 07/26/2023    RDW 13.3 07/26/2023     07/26/2023    MPV 9.9 07/26/2023        Lipid Panel:  Lab Results   Component Value Date    CHOL 163 07/26/2023    HDL 41 07/26/2023    LDL 97.00 07/26/2023    TRIG 126 07/26/2023    TOTALCHOLEST 4 07/26/2023        Urine:  Lab Results   Component Value Date    COLORUA Colorless (A) 07/26/2023    APPEARANCEUA Clear 07/26/2023    SGUA 1.009 07/26/2023    PHUA 7.5 07/26/2023    PROTEINUA Negative 07/26/2023    GLUCOSEUA Normal  07/26/2023    KETONESUA Negative 07/26/2023    BLOODUA 1+ (A) 07/26/2023    NITRITESUA Negative 07/26/2023    LEUKOCYTESUR Negative 07/26/2023    RBCUA 0-5 07/26/2023    WBCUA 0-5 07/26/2023    BACTERIA None Seen 07/26/2023    SQEPUA Occ (A) 07/26/2023    HYALINECASTS None Seen 07/26/2023          Assessment       ICD-10-CM ICD-9-CM   1. Hyperlipidemia, unspecified hyperlipidemia type  E78.5 272.4   2. Hypertension, unspecified type  I10 401.9   3. Obesity, unspecified classification, unspecified obesity type, unspecified whether serious comorbidity present  E66.9 278.00   4. Vitamin D deficiency  E55.9 268.9   5. Increased frequency of urination  R35.0 788.41   6. Chronic pain of left ankle  M25.572 719.47    G89.29 338.29   7. Gout, unspecified cause, unspecified chronicity, unspecified site  M10.9 274.9        Plan   1. Hyperlipidemia, unspecified hyperlipidemia type  Lab Results   Component Value Date    LDL 97.00 07/26/2023    HDL 41 07/26/2023    TRIG 126 07/26/2023       Cont RX daily; refills given. Take Omega 3 daily.   Stressed importance of dietary modifications. Follow a low cholesterol, low saturated fat diet with less that 200mg of cholesterol a day.  Avoid fried foods and high saturated fats (high saturated fats less than 7% of calories).  Add Flax Seed/Fish Oil supplements to diet. Increase dietary fiber.  Regular exercise can reduce LDL and raise HDL. Stressed importance of physical activity 5 times per week for 30 minutes per day.    - CBC Auto Differential; Future  - Comprehensive Metabolic Panel; Future  - Lipid Panel; Future  - Urinalysis; Future  - Urinalysis    2. Hypertension, unspecified type  BP and HR stable. Med refills. DASH diet: Eat more fruits, vegetables, and low fat dairy foods.  (Less than 2 grams of sodium per day).  Maintain healthy weight with goal BMI <30.   Exercise 30 minutes per day 5 days per week.  Home medications refilled and continued.   Home BP monitoring encouraged  with BP parameters given.    - CBC Auto Differential; Future  - Comprehensive Metabolic Panel; Future  - Lipid Panel; Future  - Urinalysis; Future  - Urinalysis    3. Obesity, unspecified classification, unspecified obesity type, unspecified whether serious comorbidity present  Goal BMI <30.  Exercise 5 times a week for 30 minutes per day.  Avoid soda, simple sugars, excessive rice, potatoes or bread. Limit fast foods and fried foods.  Choose complex carbs in moderation (example: green vegetables, beans, oatmeal). Eat plenty of fresh fruits and vegetables with lean meats daily.  Do not skip meals. Eat a balanced portion size.  Avoid fad diets. Consider permanent healthy life style changes.       4. Vitamin D deficiency  Vitamin D level reviewed and is currently at goal, between 30-80 ng/mL. Continue OTC Vitamin D3 2000 IU daily.   - Vitamin D; Future    5. Increased frequency of urination  Following URO   CT reviewed without concerning finding  - Urinalysis, Reflex to Urine Culture; Future    6. Chronic pain of left ankle    - diclofenac sodium (VOLTAREN) 1 % Gel; Apply 2 g topically 3 (three) times daily as needed (ankle pain).  Dispense: 100 g; Refill: 1    7. Gout, unspecified cause, unspecified chronicity, unspecified site  RX allopurinol 100 qd started  URIC acid elevated at 9.8H  Low purine diet        Current Outpatient Medications   Medication Instructions    allopurinoL (ZYLOPRIM) 100 mg, Oral, Daily    amLODIPine (NORVASC) 10 mg, Oral, Daily    benazepriL (LOTENSIN) 40 mg, Oral, Daily    diclofenac (VOLTAREN) 50 mg, Oral, 2 times daily PRN    diclofenac sodium (VOLTAREN) 2 g, Topical (Top), 3 times daily PRN    famotidine (PEPCID) 20 mg, Oral, Nightly PRN    magnesium hydroxide (MILK OF MAGNESIA ORAL) Oral    meclizine (ANTIVERT) 25 mg, Oral, 3 times daily PRN    simvastatin (ZOCOR) 40 mg, Oral, Nightly    triamterene-hydrochlorothiazide 37.5-25 mg (DYAZIDE) 37.5-25 mg per capsule 1 capsule, Oral, Daily        Orders Placed This Encounter   Procedures    CBC Auto Differential    Comprehensive Metabolic Panel    Lipid Panel    Urinalysis    Vitamin D    Urinalysis, Reflex to Urine Culture    Uric Acid         Future Appointments   Date Time Provider Department Center   8/9/2023  7:45 AM Blaire Mendez DO [facility] UROLO [place]    1/29/2024  9:30 AM NHUNG Albarado [facility] INTMED [place]         Follow up in about 6 months (around 1/26/2024) for lab review.    Labs thoroughly reviewed with patient. Medication refills addressed today.  RTC prn and 6 months, with labs 1 week prior to the apt.  COVID 19 precautions given to patient.  Patient voices understanding of all discharge instructions.      NHUNG Albarado

## 2023-07-27 NOTE — TELEPHONE ENCOUNTER
Please inform pt that her urine test from today was negative for infection. Keep UROLOGY apt as scheduled. No changes in meds today.thx

## 2023-08-09 ENCOUNTER — OFFICE VISIT (OUTPATIENT)
Dept: UROLOGY | Facility: CLINIC | Age: 88
End: 2023-08-09
Payer: MEDICARE

## 2023-08-09 VITALS
OXYGEN SATURATION: 99 % | WEIGHT: 205 LBS | HEART RATE: 60 BPM | DIASTOLIC BLOOD PRESSURE: 60 MMHG | SYSTOLIC BLOOD PRESSURE: 132 MMHG | TEMPERATURE: 98 F | BODY MASS INDEX: 35 KG/M2 | HEIGHT: 64 IN

## 2023-08-09 DIAGNOSIS — N28.89 RENAL MASS, LEFT: Primary | ICD-10-CM

## 2023-08-09 DIAGNOSIS — N28.1 RENAL CYST: ICD-10-CM

## 2023-08-09 LAB
BILIRUB SERPL-MCNC: NEGATIVE MG/DL
BLOOD URINE, POC: NORMAL
COLOR, POC UA: YELLOW
GLUCOSE UR QL STRIP: NEGATIVE
KETONES UR QL STRIP: NEGATIVE
LEUKOCYTE ESTERASE URINE, POC: NEGATIVE
NITRITE, POC UA: NEGATIVE
PH, POC UA: 7
PROTEIN, POC: NEGATIVE
SPECIFIC GRAVITY, POC UA: 1.01
UROBILINOGEN, POC UA: 0.2

## 2023-08-09 PROCEDURE — 1159F PR MEDICATION LIST DOCUMENTED IN MEDICAL RECORD: ICD-10-PCS | Mod: CPTII,,, | Performed by: UROLOGY

## 2023-08-09 PROCEDURE — 1159F MED LIST DOCD IN RCRD: CPT | Mod: CPTII,,, | Performed by: UROLOGY

## 2023-08-09 PROCEDURE — 1160F RVW MEDS BY RX/DR IN RCRD: CPT | Mod: CPTII,,, | Performed by: UROLOGY

## 2023-08-09 PROCEDURE — 1126F AMNT PAIN NOTED NONE PRSNT: CPT | Mod: CPTII,,, | Performed by: UROLOGY

## 2023-08-09 PROCEDURE — 1126F PR PAIN SEVERITY QUANTIFIED, NO PAIN PRESENT: ICD-10-PCS | Mod: CPTII,,, | Performed by: UROLOGY

## 2023-08-09 PROCEDURE — 99215 OFFICE O/P EST HI 40 MIN: CPT | Mod: PBBFAC | Performed by: UROLOGY

## 2023-08-09 PROCEDURE — 81001 URINALYSIS AUTO W/SCOPE: CPT | Mod: PBBFAC | Performed by: UROLOGY

## 2023-08-09 PROCEDURE — 99203 OFFICE O/P NEW LOW 30 MIN: CPT | Mod: S$PBB,,, | Performed by: UROLOGY

## 2023-08-09 PROCEDURE — 1160F PR REVIEW ALL MEDS BY PRESCRIBER/CLIN PHARMACIST DOCUMENTED: ICD-10-PCS | Mod: CPTII,,, | Performed by: UROLOGY

## 2023-08-09 PROCEDURE — 99203 PR OFFICE/OUTPT VISIT, NEW, LEVL III, 30-44 MIN: ICD-10-PCS | Mod: S$PBB,,, | Performed by: UROLOGY

## 2023-08-09 NOTE — PROGRESS NOTES
CC:  Imaging results    HPI:  [facility] is a 88 y.o. female here for follow-up of left renal mass.  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  This has been visible since at least 2019.  Her last follow-up was a year ago and the lesion on that CT scan had not changed.  She was supposed to return in six months but is now returning with a CT scan that was done in March of 2023.  She has no complaints.    Urinalysis:    Results for orders placed or performed in visit on 08/09/23   POCT URINE DIPSTICK WITH MICROSCOPE, AUTOMATED   Result Value Ref Range    Color, UA Yellow     Spec Grav UA 1.015     pH, UA 7.0     WBC, UA Negative     Nitrite, UA Negative     Protein, POC Negative     Glucose, UA Negative     Ketones, UA Negative     Urobilinogen, UA 0.2     Bilirubin, POC Negative     Blood, UA Small      Microscopic:  1-2 RBC per HPF    Lab Results:  Recent Labs     07/26/23  0758   CREATININE 0.96        Imaging:  CT - 20 March 2023:    -  The kidneys poorly visualized due to significant motion artifact.  There are multiple punctate hypoattenuated areas in the kidneys bilaterally consistent with simple cysts.  Delayed imaging demonstrates no abnormal enhancement.      Data Review:  Last urology note from Dr. Noam Tatum.  Creatinine.  CT.    ROS:  All systems reviewed and are negative except as documented in HPI and/or Assessment and Plan.     Patient Active Problem List:     Patient Active Problem List   Diagnosis    Hyperlipidemia    Hypertension    Obesity    Renal mass    Vitamin D deficiency        Past Medical History:  Past Medical History:   Diagnosis Date    Essential (primary) hypertension     Heart disease         Past Surgical History:  Past Surgical History:   Procedure Laterality Date    HYSTERECTOMY      LEFT HEART CATHETERIZATION  06/11/2021        Family History:  Family History   Problem Relation Age of Onset    Breast cancer Mother     Breast  cancer Sister         Social History:  Social History     Socioeconomic History    Marital status:     Number of children: 10   Occupational History    Occupation: Retired   Tobacco Use    Smoking status: Never    Smokeless tobacco: Never   Substance and Sexual Activity    Alcohol use: Never    Drug use: Never     Social Determinants of Health     Financial Resource Strain: Low Risk  (7/26/2023)    Overall Financial Resource Strain (CARDIA)     Difficulty of Paying Living Expenses: Not very hard   Food Insecurity: No Food Insecurity (7/26/2023)    Hunger Vital Sign     Worried About Running Out of Food in the Last Year: Never true     Ran Out of Food in the Last Year: Never true   Transportation Needs: No Transportation Needs (7/26/2023)    PRAPARE - Transportation     Lack of Transportation (Medical): No     Lack of Transportation (Non-Medical): No   Physical Activity: Inactive (7/26/2023)    Exercise Vital Sign     Days of Exercise per Week: 0 days     Minutes of Exercise per Session: 0 min   Social Connections: Unknown (7/26/2023)    Social Connection and Isolation Panel [NHANES]     Frequency of Communication with Friends and Family: More than three times a week     Frequency of Social Gatherings with Friends and Family: More than three times a week     Attends [employer] Services: Patient refused     Active Member of Clubs or Organizations: No     Attends Club or Organization Meetings: Never     Marital Status:    Housing Stability: Low Risk  (7/26/2023)    Housing Stability Vital Sign     Unable to Pay for Housing in the Last Year: No     Number of Places Lived in the Last Year: 1     Unstable Housing in the Last Year: No        Allergies:  Review of patient's allergies indicates:  No Known Allergies     Objective:  Vitals:    08/09/23 0739   BP: 132/60   Pulse: 60   Temp: 98.1 °F (36.7 °C)     General:  Well developed, well nourished adult female in no acute distress  Abdomen: Soft, nontender, no  masses  Extremities:  No clubbing, cyanosis, or edema  Neurologic:  Grossly intact  Musculoskeletal:  Normal tone      Assessment:  1. Renal mass, left  - CT Abdomen Pelvis W Wo Contrast; Future  - Creatinine, serum; Future    2. Renal cyst  - POCT URINE DIPSTICK WITH MICROSCOPE, AUTOMATED  - CT Abdomen Pelvis W Wo Contrast; Future  - Creatinine, serum; Future       Plan:  1 and 2.  The report from the most recent CT scan is a little confusing because it does not correlate well with the previous CT scans however there is mentioned that there is motion artifact which makes it more difficult to completely evaluate the lesion.  I am going to continue to follow this with a repeat CT scan one year from the last one.    Follow-up:    March with a CT prior.

## 2023-08-09 NOTE — PROGRESS NOTES
Patient seen by Dr. DAVID Mendez. Will return in march with CT scan. Written and verbal discharge instructions given.

## 2024-01-30 ENCOUNTER — LAB VISIT (OUTPATIENT)
Dept: LAB | Facility: HOSPITAL | Age: 89
End: 2024-01-30
Attending: NURSE PRACTITIONER
Payer: MEDICARE

## 2024-01-30 DIAGNOSIS — E78.5 HYPERLIPIDEMIA, UNSPECIFIED HYPERLIPIDEMIA TYPE: ICD-10-CM

## 2024-01-30 DIAGNOSIS — M10.9 GOUT, UNSPECIFIED CAUSE, UNSPECIFIED CHRONICITY, UNSPECIFIED SITE: ICD-10-CM

## 2024-01-30 DIAGNOSIS — I10 HYPERTENSION, UNSPECIFIED TYPE: ICD-10-CM

## 2024-01-30 DIAGNOSIS — E55.9 VITAMIN D DEFICIENCY: ICD-10-CM

## 2024-01-30 LAB
ALBUMIN SERPL-MCNC: 3.6 G/DL (ref 3.4–4.8)
ALBUMIN/GLOB SERPL: 1 RATIO (ref 1.1–2)
ALP SERPL-CCNC: 65 UNIT/L (ref 40–150)
ALT SERPL-CCNC: 10 UNIT/L (ref 0–55)
AST SERPL-CCNC: 18 UNIT/L (ref 5–34)
BASOPHILS # BLD AUTO: 0.04 X10(3)/MCL
BASOPHILS NFR BLD AUTO: 0.5 %
BILIRUB SERPL-MCNC: 0.8 MG/DL
BUN SERPL-MCNC: 27.9 MG/DL (ref 9.8–20.1)
CALCIUM SERPL-MCNC: 9.5 MG/DL (ref 8.4–10.2)
CHLORIDE SERPL-SCNC: 104 MMOL/L (ref 98–107)
CHOLEST SERPL-MCNC: 145 MG/DL
CHOLEST/HDLC SERPL: 3 {RATIO} (ref 0–5)
CO2 SERPL-SCNC: 27 MMOL/L (ref 23–31)
CREAT SERPL-MCNC: 0.94 MG/DL (ref 0.55–1.02)
DEPRECATED CALCIDIOL+CALCIFEROL SERPL-MC: 33.8 NG/ML (ref 30–80)
EOSINOPHIL # BLD AUTO: 0.92 X10(3)/MCL (ref 0–0.9)
EOSINOPHIL NFR BLD AUTO: 12.2 %
ERYTHROCYTE [DISTWIDTH] IN BLOOD BY AUTOMATED COUNT: 14.2 % (ref 11.5–17)
GFR SERPLBLD CREATININE-BSD FMLA CKD-EPI: 59 MLS/MIN/1.73/M2
GLOBULIN SER-MCNC: 3.6 GM/DL (ref 2.4–3.5)
GLUCOSE SERPL-MCNC: 101 MG/DL (ref 82–115)
HCT VFR BLD AUTO: 33.4 % (ref 37–47)
HDLC SERPL-MCNC: 45 MG/DL (ref 35–60)
HGB BLD-MCNC: 10.4 G/DL (ref 12–16)
IMM GRANULOCYTES # BLD AUTO: 0.02 X10(3)/MCL (ref 0–0.04)
IMM GRANULOCYTES NFR BLD AUTO: 0.3 %
LDLC SERPL CALC-MCNC: 82 MG/DL (ref 50–140)
LYMPHOCYTES # BLD AUTO: 2.59 X10(3)/MCL (ref 0.6–4.6)
LYMPHOCYTES NFR BLD AUTO: 34.4 %
MCH RBC QN AUTO: 28.9 PG (ref 27–31)
MCHC RBC AUTO-ENTMCNC: 31.1 G/DL (ref 33–36)
MCV RBC AUTO: 92.8 FL (ref 80–94)
MONOCYTES # BLD AUTO: 0.63 X10(3)/MCL (ref 0.1–1.3)
MONOCYTES NFR BLD AUTO: 8.4 %
NEUTROPHILS # BLD AUTO: 3.33 X10(3)/MCL (ref 2.1–9.2)
NEUTROPHILS NFR BLD AUTO: 44.2 %
PLATELET # BLD AUTO: 325 X10(3)/MCL (ref 130–400)
PMV BLD AUTO: 9.9 FL (ref 7.4–10.4)
POTASSIUM SERPL-SCNC: 3.9 MMOL/L (ref 3.5–5.1)
PROT SERPL-MCNC: 7.2 GM/DL (ref 5.8–7.6)
RBC # BLD AUTO: 3.6 X10(6)/MCL (ref 4.2–5.4)
SODIUM SERPL-SCNC: 139 MMOL/L (ref 136–145)
TRIGL SERPL-MCNC: 88 MG/DL (ref 37–140)
URATE SERPL-MCNC: 6.9 MG/DL (ref 2.6–6)
VLDLC SERPL CALC-MCNC: 18 MG/DL
WBC # SPEC AUTO: 7.53 X10(3)/MCL (ref 4.5–11.5)

## 2024-01-30 PROCEDURE — 84550 ASSAY OF BLOOD/URIC ACID: CPT

## 2024-01-30 PROCEDURE — 82306 VITAMIN D 25 HYDROXY: CPT

## 2024-01-30 PROCEDURE — 36415 COLL VENOUS BLD VENIPUNCTURE: CPT

## 2024-01-30 PROCEDURE — 85025 COMPLETE CBC W/AUTO DIFF WBC: CPT

## 2024-01-30 PROCEDURE — 80061 LIPID PANEL: CPT

## 2024-01-30 PROCEDURE — 80053 COMPREHEN METABOLIC PANEL: CPT

## 2024-01-31 ENCOUNTER — OFFICE VISIT (OUTPATIENT)
Dept: INTERNAL MEDICINE | Facility: CLINIC | Age: 89
End: 2024-01-31
Payer: MEDICARE

## 2024-01-31 VITALS
DIASTOLIC BLOOD PRESSURE: 60 MMHG | BODY MASS INDEX: 34.15 KG/M2 | HEIGHT: 64 IN | TEMPERATURE: 97 F | RESPIRATION RATE: 16 BRPM | WEIGHT: 200 LBS | SYSTOLIC BLOOD PRESSURE: 129 MMHG | HEART RATE: 60 BPM

## 2024-01-31 DIAGNOSIS — E78.5 HYPERLIPIDEMIA, UNSPECIFIED HYPERLIPIDEMIA TYPE: ICD-10-CM

## 2024-01-31 DIAGNOSIS — E66.9 OBESITY, UNSPECIFIED CLASSIFICATION, UNSPECIFIED OBESITY TYPE, UNSPECIFIED WHETHER SERIOUS COMORBIDITY PRESENT: ICD-10-CM

## 2024-01-31 DIAGNOSIS — I10 HYPERTENSION, UNSPECIFIED TYPE: ICD-10-CM

## 2024-01-31 DIAGNOSIS — E55.9 VITAMIN D DEFICIENCY: ICD-10-CM

## 2024-01-31 DIAGNOSIS — N28.89 RENAL MASS, LEFT: ICD-10-CM

## 2024-01-31 DIAGNOSIS — M10.9 GOUT, UNSPECIFIED CAUSE, UNSPECIFIED CHRONICITY, UNSPECIFIED SITE: ICD-10-CM

## 2024-01-31 DIAGNOSIS — Z23 NEED FOR INFLUENZA VACCINATION: ICD-10-CM

## 2024-01-31 PROCEDURE — 99214 OFFICE O/P EST MOD 30 MIN: CPT | Mod: S$PBB,,, | Performed by: NURSE PRACTITIONER

## 2024-01-31 PROCEDURE — 99214 OFFICE O/P EST MOD 30 MIN: CPT | Mod: PBBFAC | Performed by: NURSE PRACTITIONER

## 2024-01-31 PROCEDURE — G0008 ADMIN INFLUENZA VIRUS VAC: HCPCS | Mod: PBBFAC

## 2024-01-31 PROCEDURE — 90694 VACC AIIV4 NO PRSRV 0.5ML IM: CPT | Mod: PBBFAC

## 2024-01-31 RX ORDER — AMLODIPINE BESYLATE 10 MG/1
10 TABLET ORAL DAILY
Qty: 90 TABLET | Refills: 1 | Status: SHIPPED | OUTPATIENT
Start: 2024-01-31 | End: 2024-07-29

## 2024-01-31 RX ORDER — BENAZEPRIL HYDROCHLORIDE 40 MG/1
40 TABLET ORAL DAILY
Qty: 90 TABLET | Refills: 1 | Status: SHIPPED | OUTPATIENT
Start: 2024-01-31 | End: 2024-07-29

## 2024-01-31 RX ORDER — SIMVASTATIN 40 MG/1
40 TABLET, FILM COATED ORAL NIGHTLY
Qty: 90 TABLET | Refills: 1 | Status: SHIPPED | OUTPATIENT
Start: 2024-01-31

## 2024-01-31 RX ORDER — ALLOPURINOL 200 MG/1
200 TABLET ORAL DAILY
Qty: 90 TABLET | Refills: 1 | Status: SHIPPED | OUTPATIENT
Start: 2024-01-31

## 2024-01-31 RX ORDER — FAMOTIDINE 20 MG/1
20 TABLET, FILM COATED ORAL NIGHTLY PRN
Qty: 30 TABLET | Refills: 2 | Status: SHIPPED | OUTPATIENT
Start: 2024-01-31

## 2024-01-31 RX ORDER — TRIAMTERENE AND HYDROCHLOROTHIAZIDE 37.5; 25 MG/1; MG/1
1 CAPSULE ORAL DAILY
Qty: 90 CAPSULE | Refills: 1 | Status: SHIPPED | OUTPATIENT
Start: 2024-01-31 | End: 2024-07-29

## 2024-01-31 RX ORDER — DICLOFENAC SODIUM 50 MG/1
50 TABLET, DELAYED RELEASE ORAL 2 TIMES DAILY PRN
Qty: 45 TABLET | Refills: 1 | Status: SHIPPED | OUTPATIENT
Start: 2024-01-31

## 2024-01-31 RX ADMIN — INFLUENZA A VIRUS A/VICTORIA/4897/2022 IVR-238 (H1N1) ANTIGEN (FORMALDEHYDE INACTIVATED), INFLUENZA A VIRUS A/DARWIN/6/2021 IVR-227 (H3N2) ANTIGEN (FORMALDEHYDE INACTIVATED), INFLUENZA B VIRUS B/AUSTRIA/1359417/2021 BVR-26 ANTIGEN (FORMALDEHYDE INACTIVATED), INFLUENZA B VIRUS B/PHUKET/3073/2013 BVR-1B ANTIGEN (FORMALDEHYDE INACTIVATED) 0.5 ML: 15; 15; 15; 15 INJECTION, SUSPENSION INTRAMUSCULAR at 09:01

## 2024-01-31 NOTE — PROGRESS NOTES
[facility]  NHUNG Albarado     Patient Name: Leda Gallardo   : 1935  MRN:[id number]     Chief Complaint     Chief Complaint   Patient presents with    Follow-up     Lab review        History of Present Illness     88 year old AAF, presents in clinic with granddaughter for routine lab f/u. Voices intermittent left foot pain several times per week, no pain today.  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 Nonsmoker. On diclofenac prn for chronic david foot/toe /ankle pain. Not taking diclofenac currently. Pain to left great toe only with walking. Denies chest pain, shortness of breath at rest, cough, fever, headache, dizziness, weakness, abdominal pain, nausea, vomiting, diarrhea, constipation, edema, dysuria, depression, anxiety.     Following [facility] for bosniak IIF or III lesion in L superior pole.  Asymptomatic. The Urologist recommended refer to [facility] for surgical eval.  She continues to refuse urology referral to [facility].  Surveillance with CT/MRI regardless every 6-12 months.      Family history of primary malignant neoplasm of breast: Mother and Sister  MMG 2019 BIRADS 1; scheduled 2021;no showed and declines MMG in future     ECHO  EF 65%  ECHO  EF 60-65%  2021:coronary angiogram  Coronary stenosis:  discussed the option of CABG versus high risk PCI versus medical management.  Started on Imdur 20 mg p.o. daily; no longer taking. Missed f/u cardio apt and advised to reschedule.        Mild aortic stenosis/mild aortic regurgitation per TTE 8.12.20  LVEF -60-65% (TTE 8.12.20)     Abnormal nuclear stress test  Chest pain/DONAHUE  Lexiscan stress test -Apical defect, medium size, moderate intensity, partially reversible (1.27.21)  LVEF-60-65%  (TTE 8.12.20)     CT abd with contrast 2021;Enhancing mass again seen involving the posterior aspect of the  superior pole of the left kidney. While this could represent  "a  neoplasm, it has not changed in size or appearance when compared to  the exam from 08/30/2019.                   Review of Systems     Review of Systems   Constitutional: Negative.    HENT: Negative.     Eyes: Negative.    Respiratory: Negative.     Cardiovascular: Negative.    Gastrointestinal: Negative.    Endocrine: Negative.    Genitourinary: Negative.    Musculoskeletal:  Positive for arthralgias.        Left foot intermittent   Integumentary:  Negative.   Allergic/Immunologic: Negative.    Neurological: Negative.    Hematological: Negative.    Psychiatric/Behavioral: Negative.     All other systems reviewed and are negative.       Physical Examination     Visit Vitals  /60 (BP Location: Left arm, Patient Position: Sitting, BP Method: Large (Automatic))   Pulse 60   Temp 97.3 °F (36.3 °C) (Oral)   Resp 16   Ht 5' 4" (1.626 m)   Wt 90.7 kg (200 lb)   BMI 34.33 kg/m²        BP Readings from Last 6 Encounters:   01/31/24 129/60   08/09/23 132/60   07/26/23 136/60   03/23/23 132/64   01/23/23 129/62   09/22/22 (!) 133/56   ]    Wt Readings from Last 6 Encounters:   01/31/24 90.7 kg (200 lb)   08/09/23 93 kg (205 lb)   07/26/23 93.9 kg (207 lb)   01/23/23 93.9 kg (207 lb)   09/22/22 91.9 kg (202 lb 9.6 oz)   07/25/22 90.9 kg (200 lb 6.4 oz)   ]      Physical Exam  Vitals and nursing note reviewed.   Constitutional:       Appearance: Normal appearance.   HENT:      Head: Normocephalic and atraumatic.      Right Ear: Tympanic membrane, ear canal and external ear normal.      Left Ear: Tympanic membrane, ear canal and external ear normal.      Nose: Nose normal.      Mouth/Throat:      Mouth: Mucous membranes are moist.      Pharynx: Oropharynx is clear.   Eyes:      Extraocular Movements: Extraocular movements intact.      Conjunctiva/sclera: Conjunctivae normal.      Pupils: Pupils are equal, round, and reactive to light.   Cardiovascular:      Rate and Rhythm: Normal rate and regular rhythm.      Pulses: " "Normal pulses.      Heart sounds: Normal heart sounds.   Pulmonary:      Effort: Pulmonary effort is normal.      Breath sounds: Normal breath sounds.   Abdominal:      General: Abdomen is flat. Bowel sounds are normal.      Palpations: Abdomen is soft.   Musculoskeletal:         General: Normal range of motion.      Cervical back: Normal range of motion and neck supple.   Skin:     General: Skin is warm and dry.      Capillary Refill: Capillary refill takes less than 2 seconds.   Neurological:      General: No focal deficit present.      Mental Status: She is alert and oriented to person, place, and time. Mental status is at baseline.   Psychiatric:         Mood and Affect: Mood normal.         Behavior: Behavior normal.         Thought Content: Thought content normal.         Judgment: Judgment normal.          Labs / Imaging     Chemistry:  Lab Results   Component Value Date     01/30/2024    K 3.9 01/30/2024    CHLORIDE 104 01/30/2024    BUN 27.9 (H) 01/30/2024    CREATININE 0.94 01/30/2024    EGFRNORACEVR 59 01/30/2024    GLUCOSE 101 01/30/2024    CALCIUM 9.5 01/30/2024    ALKPHOS 65 01/30/2024    LABPROT 7.2 01/30/2024    ALBUMIN 3.6 01/30/2024    BILIDIR 0.4 05/06/2022    IBILI 0.50 05/06/2022    AST 18 01/30/2024    ALT 10 01/30/2024    MG 2.10 03/23/2023    [id number] 33.8 01/30/2024        No results found for: "HGBA1C", "MICROALBCREA"     Hematology:  Lab Results   Component Value Date    WBC 7.53 01/30/2024    RBC 3.60 (L) 01/30/2024    HGB 10.4 (L) 01/30/2024    HCT 33.4 (L) 01/30/2024    MCV 92.8 01/30/2024    MCH 28.9 01/30/2024    MCHC 31.1 (L) 01/30/2024    RDW 14.2 01/30/2024     01/30/2024    MPV 9.9 01/30/2024        Lipid Panel:  Lab Results   Component Value Date    CHOL 145 01/30/2024    HDL 45 01/30/2024    LDL 82.00 01/30/2024    TRIG 88 01/30/2024    TOTALCHOLEST 3 01/30/2024        Urine:  Lab Results   Component Value Date    COLORUA Colorless (A) 07/26/2023    APPEARANCEUA " Clear 07/26/2023    SGUA 1.009 07/26/2023    PHUA 7.5 07/26/2023    PROTEINUA Negative 07/26/2023    GLUCOSEUA Normal 07/26/2023    KETONESUA Negative 07/26/2023    BLOODUA 1+ (A) 07/26/2023    NITRITESUA Negative 07/26/2023    LEUKOCYTESUR Negative 07/26/2023    RBCUA 0-5 07/26/2023    WBCUA 0-5 07/26/2023    BACTERIA None Seen 07/26/2023    SQEPUA Occ (A) 07/26/2023    HYALINECASTS None Seen 07/26/2023          Assessment       ICD-10-CM ICD-9-CM   1. Hyperlipidemia, unspecified hyperlipidemia type  E78.5 272.4   2. Hypertension, unspecified type  I10 401.9   3. Obesity, unspecified classification, unspecified obesity type, unspecified whether serious comorbidity present  E66.9 278.00   4. Vitamin D deficiency  E55.9 268.9   5. Renal mass, left  N28.89 593.9   6. Need for influenza vaccination  Z23 V04.81   7. Gout, unspecified cause, unspecified chronicity, unspecified site  M10.9 274.9        Plan     1. Hyperlipidemia, unspecified hyperlipidemia type  Lab Results   Component Value Date    LDL 82.00 01/30/2024    CHOL 145 01/30/2024    HDL 45 01/30/2024    TRIG 88 01/30/2024       Cont RX daily; refills given. Take Omega 3 daily.   Stressed importance of dietary modifications. Follow a low cholesterol, low saturated fat diet with less that 200mg of cholesterol a day.  Avoid fried foods and high saturated fats (high saturated fats less than 7% of calories).  Add Flax Seed/Fish Oil supplements to diet. Increase dietary fiber.  Regular exercise can reduce LDL and raise HDL. Stressed importance of physical activity 5 times per week for 30 minutes per day.      2. Hypertension, unspecified type  BP and HR stable. Med refills. DASH diet: Eat more fruits, vegetables, and low fat dairy foods.  (Less than 2 grams of sodium per day).  Maintain healthy weight with goal BMI <30.   Exercise 30 minutes per day 5 days per week.  Home medications refilled and continued.   Home BP monitoring encouraged with BP parameters given.       3. Obesity, unspecified classification, unspecified obesity type, unspecified whether serious comorbidity present  Goal BMI <30.  Exercise 5 times a week for 30 minutes per day.  Avoid soda, simple sugars, excessive rice, potatoes or bread. Limit fast foods and fried foods.  Choose complex carbs in moderation (example: green vegetables, beans, oatmeal). Eat plenty of fresh fruits and vegetables with lean meats daily.  Do not skip meals. Eat a balanced portion size.  Avoid fad diets. Consider permanent healthy life style changes.       4. Vitamin D deficiency  Vitamin D level reviewed and is currently at goal, between 30-80 ng/mL. Continue OTC Vitamin D3 2000 IU daily.     5. Renal mass, left  Following [facility]RO for surveillance    6. Need for influenza vaccination  Admin today  - influenza 65up-adj (QUADRIVALENT ADJUVANTED PF) vaccine 0.5 mL    7. Gout, unspecified cause, unspecified chronicity, unspecified site  Uric acid 6.9. Increase allopurinol to 200 daily  Stay well hydrated by increasing water intake throughout the day.  Stressed importance of exercise and weight loss to maintain BMI <30.  Avoid alcohol, sodas, organ/glandular meats (liver, kidney, sweetbreads). Limit seafood and red meat intake.  Eat a balanced diet of fruits, vegetables, complex carbohydrates, and lean sources of protein (boneless/skinless chicken breasts, salmon, lentils, low fat dairy).  Discussed possible benefit of OTC Vitamin C supplementation.    - allopurinoL 200 mg Tab; Take 200 mg by mouth once daily.  Dispense: 90 tablet; Refill: 1        Current Outpatient Medications   Medication Instructions    allopurinoL 200 mg, Oral, Daily    amLODIPine (NORVASC) 10 mg, Oral, Daily    benazepriL (LOTENSIN) 40 mg, Oral, Daily    diclofenac (VOLTAREN) 50 mg, Oral, 2 times daily PRN    diclofenac sodium (VOLTAREN) 2 g, Topical (Top), 3 times daily PRN    famotidine (PEPCID) 20 mg, Oral, Nightly PRN    magnesium hydroxide (MILK OF MAGNESIA ORAL)  Oral    meclizine (ANTIVERT) 25 mg, Oral, 3 times daily PRN    simvastatin (ZOCOR) 40 mg, Oral, Nightly    triamterene-hydrochlorothiazide 37.5-25 mg (DYAZIDE) 37.5-25 mg per capsule 1 capsule, Oral, Daily       Orders Placed This Encounter   Procedures    CBC Auto Differential    Comprehensive Metabolic Panel    Lipid Panel    TSH    Urinalysis    Vitamin D    Uric Acid         Future Appointments   Date Time Provider Department Center   3/20/2024  7:45 AM Blaire Mendez DO [facility]   5/30/2024  8:20 AM Drea Ribeiro FNP [facility]        Follow up in about 4 months (around 5/31/2024) for lab review.    Labs thoroughly reviewed with patient. Medication refills addressed today.  RTC prn and 4 months, with labs 1 week prior to the apt.  COVID 19 precautions given to patient.  Patient voices understanding of all discharge instructions.      NHUNG Albarado

## 2024-01-31 NOTE — LETTER
I certify that (Name) Leda Gallardo meets the requirements as outlined in # 1 (shown on reverse side) and qualifies for a mobility impaired license plate/hang-tag. I further understand that willful and false certification shall subject me to fines/imprisonment as outlined in R.S. 47:463.4 (G) (4). The applicant's information is as follows:    YOB: 1935            Race:Black or         Gender:Female    Address:  [address] [place] [address]    City:[place]                               State:[place]     Zip Code:[address]     []Permanently Impaired - Applicant has a total or lifelong condition of mobility impairment from which little or no improvement or recovery can reasonably be expected. A medical examiners certification is required on initial application only.      [x] Temporarily Impaired - Applicant has a temporary condition of mobility impairment of which improvement or recovery can reasonably be expected. Applicant is entitled to a hangtag, which will be valid for one (1) year. A medical examiners certification is required for the renewal of the hangtag      [] Unable to appear in person at the Office of Motor Vehicles - Applicant must bring facial photo        Medical Examiner's Signature___ _________________                     Date:1/31/24_________________________    Printed Name:    Drea Ribeiro NP____________________________State License #_[id number]____________________________    Address: [address] [place] [address] [address]                                     Phone Number: [phone]                                                                                                                                                                                                                  City: [place]__________________________________ State: [place] __Zip Code: [address] _______________    TO BE COMPLETED BY MOTOR VEHICLE ANALYST ONLY    ABDULKADIR   Lic. Plate #      Hangtag Control #    Sarah Beth ID #      Date Issued:    #:   Office #:

## 2024-02-25 ENCOUNTER — HOSPITAL ENCOUNTER (EMERGENCY)
Facility: HOSPITAL | Age: 89
Discharge: HOME | End: 2024-02-25
Attending: STUDENT IN AN ORGANIZED HEALTH CARE EDUCATION/TRAINING PROGRAM
Payer: MEDICARE

## 2024-02-25 VITALS
HEART RATE: 58 BPM | DIASTOLIC BLOOD PRESSURE: 75 MMHG | SYSTOLIC BLOOD PRESSURE: 168 MMHG | OXYGEN SATURATION: 98 % | WEIGHT: 205 LBS | BODY MASS INDEX: 35 KG/M2 | RESPIRATION RATE: 18 BRPM | TEMPERATURE: 98 F | HEIGHT: 64 IN

## 2024-02-25 DIAGNOSIS — S90.822A BLISTER OF LEFT FOOT, INITIAL ENCOUNTER: Primary | ICD-10-CM

## 2024-02-25 PROCEDURE — 99282 EMERGENCY DEPT VISIT SF MDM: CPT

## 2024-02-26 NOTE — ED PROVIDER NOTES
Encounter Date: 2/25/2024       History     Chief Complaint   Patient presents with    Foot Pain     Pt has a fluid filled blister to the plantar surface of the L distal foot.  Pt states it started one week ago.       Patient with a blister to the plantar surface of her right forefoot present for about a week, minimal tenderness, no redness    The history is provided by the patient.     Review of patient's allergies indicates:  No Known Allergies  Past Medical History:   Diagnosis Date    Essential (primary) hypertension     Heart disease      Past Surgical History:   Procedure Laterality Date    HYSTERECTOMY      LEFT HEART CATHETERIZATION  06/11/2021     Family History   Problem Relation Age of Onset    Breast cancer Mother     Breast cancer Sister      Social History     Tobacco Use    Smoking status: Never    Smokeless tobacco: Never   Substance Use Topics    Alcohol use: Never    Drug use: Never     Review of Systems   Constitutional: Negative.    HENT: Negative.     Respiratory: Negative.     Cardiovascular: Negative.    Gastrointestinal: Negative.    Musculoskeletal: Negative.    Skin: Negative.    Neurological: Negative.    All other systems reviewed and are negative.      Physical Exam     Initial Vitals [02/25/24 1407]   BP Pulse Resp Temp SpO2   (!) 168/75 (!) 58 18 97.8 °F (36.6 °C) 98 %      MAP       --         Physical Exam    Nursing note and vitals reviewed.  Constitutional: She appears well-developed and well-nourished.   HENT:   Head: Normocephalic and atraumatic.   Eyes: Pupils are equal, round, and reactive to light.   Neck:   Normal range of motion.  Cardiovascular:  Normal rate, regular rhythm, normal heart sounds and intact distal pulses.           Pulmonary/Chest: Breath sounds normal.   Musculoskeletal:         General: Normal range of motion.      Cervical back: Normal range of motion.     Neurological: She is alert and oriented to person, place, and time. She has normal strength.   Skin:  Skin is warm and dry.   Vesicle/blister right forefoot plantar surface         ED Course   Procedures  Labs Reviewed - No data to display       Imaging Results    None          Medications - No data to display  Medical Decision Making  Patient with a blister to the plantar surface of her right forefoot present for about a week, minimal tenderness, no redness    DIFFERENTIAL DIAGNOSIS- blister right foot    Risk  Risk Details: Blister drained using the tip 18 gauge needle, serous fluid removed, tolerated well, sterile bandage placed; recommended a change in her footwear                                      Clinical Impression:  Final diagnoses:  [S90.822A] Blister of left foot, initial encounter (Primary)          ED Disposition Condition    Discharge Stable          ED Prescriptions    None       Follow-up Information       Follow up With Specialties Details Why Contact Info    Drea Ribeiro, MARGARETP Nurse Practitioner  As [facility] [address] [facility] [address]  [phone]               Darron Acevedo MD  02/25/24 2043

## 2024-03-22 ENCOUNTER — HOSPITAL ENCOUNTER (EMERGENCY)
Facility: HOSPITAL | Age: 89
Discharge: HOME | End: 2024-03-22
Attending: EMERGENCY MEDICINE
Payer: MEDICARE

## 2024-03-22 VITALS
WEIGHT: 205 LBS | HEIGHT: 64 IN | BODY MASS INDEX: 35 KG/M2 | SYSTOLIC BLOOD PRESSURE: 156 MMHG | RESPIRATION RATE: 20 BRPM | OXYGEN SATURATION: 97 % | TEMPERATURE: 98 F | HEART RATE: 60 BPM | DIASTOLIC BLOOD PRESSURE: 69 MMHG

## 2024-03-22 DIAGNOSIS — H61.23 BILATERAL IMPACTED CERUMEN: Primary | ICD-10-CM

## 2024-03-22 PROCEDURE — 99282 EMERGENCY DEPT VISIT SF MDM: CPT

## 2024-03-22 NOTE — ED PROVIDER NOTES
Encounter Date: 3/22/2024       History     Chief Complaint   Patient presents with    Ear Fullness     Complains of an echo in her right ear. Denies pain     This 88-year-old female presents with complaints of fullness in both ears with decreased hearing for the past several days.       Review of patient's allergies indicates:  No Known Allergies  Past Medical History:   Diagnosis Date    Essential (primary) hypertension     Heart disease      Past Surgical History:   Procedure Laterality Date    HYSTERECTOMY      LEFT HEART CATHETERIZATION  06/11/2021     Family History   Problem Relation Age of Onset    Breast cancer Mother     Breast cancer Sister      Social History     Tobacco Use    Smoking status: Never    Smokeless tobacco: Never   Substance Use Topics    Alcohol use: Never    Drug use: Never     Review of Systems   Constitutional:  Negative for fever.   HENT:  Negative for sore throat.    Respiratory:  Negative for shortness of breath.    Cardiovascular:  Negative for chest pain.   Gastrointestinal:  Negative for nausea.   Genitourinary:  Negative for dysuria.   Musculoskeletal:  Negative for back pain.   Skin:  Negative for rash.   Neurological:  Negative for weakness.   Hematological:  Does not bruise/bleed easily.       Physical Exam     Initial Vitals [03/22/24 1531]   BP Pulse Resp Temp SpO2   (!) 156/69 60 20 98.2 °F (36.8 °C) 97 %      MAP       --         Physical Exam    Nursing note and vitals reviewed.  Constitutional: She appears well-developed and well-nourished.   HENT:   Head: Normocephalic and atraumatic.   Bilateral cerumen impaction   Eyes: Conjunctivae and EOM are normal. Pupils are equal, round, and reactive to light.   Neck: Neck supple.   Normal range of motion.  Cardiovascular:  Normal rate, regular rhythm, normal heart sounds and intact distal pulses.           Pulmonary/Chest: Breath sounds normal.   Abdominal: Abdomen is soft. Bowel sounds are normal.   Musculoskeletal:          General: Normal range of motion.      Cervical back: Normal range of motion and neck supple.     Neurological: She is alert and oriented to person, place, and time. She has normal strength.   Skin: Skin is warm and dry. Capillary refill takes less than 2 seconds.   Psychiatric: She has a normal mood and affect. Her behavior is normal. Judgment and thought content normal.         ED Course   Procedures  Labs Reviewed - No data to display       Imaging Results    None          Medications - No data to display  Medical Decision Making                                    Clinical Impression:  Final diagnoses:  [H61.23] Bilateral impacted cerumen (Primary)          ED Disposition Condition    Discharge Stable          ED Prescriptions       Medication Sig Dispense Start Date End Date Auth. Provider    carbamide peroxide (DEBROX) 6.5 % otic solution Place 5 drops into both ears as needed. 15 mL 3/22/2024 -- Hermelindo Nguyen MD          Follow-up Information       Follow up With Specialties Details Why Contact Info    Drea Ribeiro, MARGARETP Nurse Practitioner Schedule an appointment as soon as possible for a visit  As needed [address] [facility] [id number] [phone]               Hermelindo Nguyen MD  03/22/24 0056

## 2024-04-25 ENCOUNTER — TELEPHONE (OUTPATIENT)
Dept: INTERNAL MEDICINE | Facility: CLINIC | Age: 89
End: 2024-04-25
Payer: MEDICARE

## 2024-04-25 DIAGNOSIS — M10.9 GOUT, UNSPECIFIED CAUSE, UNSPECIFIED CHRONICITY, UNSPECIFIED SITE: ICD-10-CM

## 2024-04-25 RX ORDER — ALLOPURINOL 100 MG/1
200 TABLET ORAL DAILY
Qty: 60 TABLET | Refills: 6 | Status: SHIPPED | OUTPATIENT
Start: 2024-04-25

## 2024-04-25 NOTE — TELEPHONE ENCOUNTER
----- Message from Vish Mae sent at 4/22/2024  9:45 AM CDT -----  .Type:  Pharmacy Calling to Clarify an RX    Name of Caller:[employer]  Pharmacy Name:[facility] [place] - [address] [facility]  Prescription Name:allopurinoL 200 mg Tab  What do they need to clarify?:change of medication, original medication not covered   Best Call Back Number:[phone]  Additional Information:

## 2024-04-25 NOTE — TELEPHONE ENCOUNTER
Spoke with pharmacist at [facility]  informed me that the 200 mg Allopurinol is not covered by insurance only the 100 mg labs are.Please submit new script  with changes.Thanks

## 2024-07-15 ENCOUNTER — LAB VISIT (OUTPATIENT)
Dept: LAB | Facility: HOSPITAL | Age: 89
End: 2024-07-15
Attending: NURSE PRACTITIONER
Payer: MEDICARE

## 2024-07-15 DIAGNOSIS — M10.9 GOUT, UNSPECIFIED CAUSE, UNSPECIFIED CHRONICITY, UNSPECIFIED SITE: ICD-10-CM

## 2024-07-15 DIAGNOSIS — I10 HYPERTENSION, UNSPECIFIED TYPE: ICD-10-CM

## 2024-07-15 DIAGNOSIS — E55.9 VITAMIN D DEFICIENCY: ICD-10-CM

## 2024-07-15 DIAGNOSIS — E78.5 HYPERLIPIDEMIA, UNSPECIFIED HYPERLIPIDEMIA TYPE: ICD-10-CM

## 2024-07-15 LAB
25(OH)D3+25(OH)D2 SERPL-MCNC: 39 NG/ML (ref 30–80)
ALBUMIN SERPL-MCNC: 3.8 G/DL (ref 3.4–4.8)
ALBUMIN/GLOB SERPL: 1.1 RATIO (ref 1.1–2)
ALP SERPL-CCNC: 64 UNIT/L (ref 40–150)
ALT SERPL-CCNC: 11 UNIT/L (ref 0–55)
ANION GAP SERPL CALC-SCNC: 8 MEQ/L
AST SERPL-CCNC: 17 UNIT/L (ref 5–34)
BACTERIA #/AREA URNS AUTO: ABNORMAL /HPF
BASOPHILS # BLD AUTO: 0.03 X10(3)/MCL
BASOPHILS NFR BLD AUTO: 0.4 %
BILIRUB SERPL-MCNC: 0.9 MG/DL
BILIRUB UR QL STRIP.AUTO: NEGATIVE
BUN SERPL-MCNC: 34.7 MG/DL (ref 9.8–20.1)
CALCIUM SERPL-MCNC: 9.7 MG/DL (ref 8.4–10.2)
CHLORIDE SERPL-SCNC: 106 MMOL/L (ref 98–107)
CHOLEST SERPL-MCNC: 173 MG/DL
CHOLEST/HDLC SERPL: 4 {RATIO} (ref 0–5)
CLARITY UR: CLEAR
CO2 SERPL-SCNC: 27 MMOL/L (ref 23–31)
COLOR UR AUTO: YELLOW
CREAT SERPL-MCNC: 1.17 MG/DL (ref 0.55–1.02)
CREAT/UREA NIT SERPL: 30
EOSINOPHIL # BLD AUTO: 0.84 X10(3)/MCL (ref 0–0.9)
EOSINOPHIL NFR BLD AUTO: 12.1 %
ERYTHROCYTE [DISTWIDTH] IN BLOOD BY AUTOMATED COUNT: 14.6 % (ref 11.5–17)
GFR SERPLBLD CREATININE-BSD FMLA CKD-EPI: 45 ML/MIN/1.73/M2
GLOBULIN SER-MCNC: 3.4 GM/DL (ref 2.4–3.5)
GLUCOSE SERPL-MCNC: 105 MG/DL (ref 82–115)
GLUCOSE UR QL STRIP: NEGATIVE
HCT VFR BLD AUTO: 33.6 % (ref 37–47)
HDLC SERPL-MCNC: 45 MG/DL (ref 35–60)
HGB BLD-MCNC: 10.7 G/DL (ref 12–16)
HGB UR QL STRIP: ABNORMAL
IMM GRANULOCYTES # BLD AUTO: 0 X10(3)/MCL (ref 0–0.04)
IMM GRANULOCYTES NFR BLD AUTO: 0 %
KETONES UR QL STRIP: NEGATIVE
LDLC SERPL CALC-MCNC: 108 MG/DL (ref 50–140)
LEUKOCYTE ESTERASE UR QL STRIP: NEGATIVE
LYMPHOCYTES # BLD AUTO: 2.75 X10(3)/MCL (ref 0.6–4.6)
LYMPHOCYTES NFR BLD AUTO: 39.6 %
MCH RBC QN AUTO: 29.4 PG (ref 27–31)
MCHC RBC AUTO-ENTMCNC: 31.8 G/DL (ref 33–36)
MCV RBC AUTO: 92.3 FL (ref 80–94)
MONOCYTES # BLD AUTO: 0.58 X10(3)/MCL (ref 0.1–1.3)
MONOCYTES NFR BLD AUTO: 8.4 %
NEUTROPHILS # BLD AUTO: 2.74 X10(3)/MCL (ref 2.1–9.2)
NEUTROPHILS NFR BLD AUTO: 39.5 %
NITRITE UR QL STRIP: NEGATIVE
PH UR STRIP: 7 [PH]
PLATELET # BLD AUTO: 323 X10(3)/MCL (ref 130–400)
PMV BLD AUTO: 9.7 FL (ref 7.4–10.4)
POTASSIUM SERPL-SCNC: 4 MMOL/L (ref 3.5–5.1)
PROT SERPL-MCNC: 7.2 GM/DL (ref 5.8–7.6)
PROT UR QL STRIP: NEGATIVE
RBC # BLD AUTO: 3.64 X10(6)/MCL (ref 4.2–5.4)
RBC #/AREA URNS AUTO: ABNORMAL /HPF
SODIUM SERPL-SCNC: 141 MMOL/L (ref 136–145)
SP GR UR STRIP.AUTO: 1.01 (ref 1–1.03)
SQUAMOUS #/AREA URNS AUTO: ABNORMAL /HPF
TRIGL SERPL-MCNC: 100 MG/DL (ref 37–140)
TSH SERPL-ACNC: 2.66 UIU/ML (ref 0.35–4.94)
URATE SERPL-MCNC: 9.7 MG/DL (ref 2.6–6)
UROBILINOGEN UR STRIP-ACNC: 0.2
VLDLC SERPL CALC-MCNC: 20 MG/DL
WBC # BLD AUTO: 6.94 X10(3)/MCL (ref 4.5–11.5)
WBC #/AREA URNS AUTO: ABNORMAL /HPF

## 2024-07-15 PROCEDURE — 36415 COLL VENOUS BLD VENIPUNCTURE: CPT

## 2024-07-15 PROCEDURE — 81003 URINALYSIS AUTO W/O SCOPE: CPT

## 2024-07-15 PROCEDURE — 82306 VITAMIN D 25 HYDROXY: CPT

## 2024-07-15 PROCEDURE — 85025 COMPLETE CBC W/AUTO DIFF WBC: CPT

## 2024-07-15 PROCEDURE — 80061 LIPID PANEL: CPT

## 2024-07-15 PROCEDURE — 80053 COMPREHEN METABOLIC PANEL: CPT

## 2024-07-15 PROCEDURE — 84443 ASSAY THYROID STIM HORMONE: CPT

## 2024-07-15 PROCEDURE — 84550 ASSAY OF BLOOD/URIC ACID: CPT

## 2024-07-17 ENCOUNTER — HOSPITAL ENCOUNTER (INPATIENT)
Facility: HOSPITAL | Age: 89
LOS: 4 days | Discharge: HOME HEALTH | DRG: 872 | End: 2024-07-23
Attending: STUDENT IN AN ORGANIZED HEALTH CARE EDUCATION/TRAINING PROGRAM | Admitting: INTERNAL MEDICINE
Payer: MEDICARE

## 2024-07-17 DIAGNOSIS — R53.1 WEAKNESS: ICD-10-CM

## 2024-07-17 DIAGNOSIS — R60.0 LOWER EXTREMITY EDEMA: ICD-10-CM

## 2024-07-17 DIAGNOSIS — R07.9 CHEST PAIN: ICD-10-CM

## 2024-07-17 DIAGNOSIS — R53.83 FATIGUE: ICD-10-CM

## 2024-07-17 DIAGNOSIS — R79.89 ELEVATED TROPONIN I LEVEL: ICD-10-CM

## 2024-07-17 DIAGNOSIS — A41.9 SEPSIS, DUE TO UNSPECIFIED ORGANISM, UNSPECIFIED WHETHER ACUTE ORGAN DYSFUNCTION PRESENT: Primary | ICD-10-CM

## 2024-07-17 LAB
ABS NEUT CALC (OHS): 17.3 X10(3)/MCL (ref 2.1–9.2)
ALBUMIN SERPL-MCNC: 3.5 G/DL (ref 3.4–4.8)
ALBUMIN/GLOB SERPL: 1.1 RATIO (ref 1.1–2)
ALP SERPL-CCNC: 69 UNIT/L (ref 40–150)
ALT SERPL-CCNC: 14 UNIT/L (ref 0–55)
ANION GAP SERPL CALC-SCNC: 8 MEQ/L
APICAL FOUR CHAMBER EJECTION FRACTION: 71 %
APICAL TWO CHAMBER EJECTION FRACTION: 67 %
AST SERPL-CCNC: 27 UNIT/L (ref 5–34)
AV INDEX (PROSTH): 0.53
AV MEAN GRADIENT: 19 MMHG
AV PEAK GRADIENT: 32 MMHG
AV REGURGITATION PRESSURE HALF TIME: 241.45 MS
AV VALVE AREA BY VELOCITY RATIO: 1.36 CM²
AV VALVE AREA: 1.89 CM²
AV VELOCITY RATIO: 0.38
BACTERIA #/AREA URNS AUTO: ABNORMAL /HPF
BILIRUB SERPL-MCNC: 1.2 MG/DL
BILIRUB UR QL STRIP.AUTO: NEGATIVE
BNP BLD-MCNC: 292.5 PG/ML
BSA FOR ECHO PROCEDURE: 2.01 M2
BUN SERPL-MCNC: 33.3 MG/DL (ref 9.8–20.1)
CALCIUM SERPL-MCNC: 9.2 MG/DL (ref 8.4–10.2)
CHLORIDE SERPL-SCNC: 103 MMOL/L (ref 98–107)
CLARITY UR: CLEAR
CO2 SERPL-SCNC: 26 MMOL/L (ref 23–31)
COLOR UR AUTO: YELLOW
CREAT SERPL-MCNC: 1.26 MG/DL (ref 0.55–1.02)
CREAT/UREA NIT SERPL: 26
CV ECHO LV RWT: 0.58 CM
DOP CALC AO PEAK VEL: 2.85 M/S
DOP CALC AO VTI: 62.3 CM
DOP CALC LVOT AREA: 3.6 CM2
DOP CALC LVOT DIAMETER: 2.14 CM
DOP CALC LVOT PEAK VEL: 1.08 M/S
DOP CALC LVOT STROKE VOLUME: 117.92 CM3
DOP CALC MV VTI: 47.6 CM
DOP CALCLVOT PEAK VEL VTI: 32.8 CM
E WAVE DECELERATION TIME: 246.06 MSEC
E/A RATIO: 0.56
E/E' RATIO: 10.8 M/S
ECHO LV POSTERIOR WALL: 1.17 CM (ref 0.6–1.1)
ERYTHROCYTE [DISTWIDTH] IN BLOOD BY AUTOMATED COUNT: 14.8 % (ref 11.5–17)
FLUAV AG UPPER RESP QL IA.RAPID: NOT DETECTED
FLUBV AG UPPER RESP QL IA.RAPID: NOT DETECTED
FRACTIONAL SHORTENING: 34 % (ref 28–44)
GFR SERPLBLD CREATININE-BSD FMLA CKD-EPI: 41 ML/MIN/1.73/M2
GLOBULIN SER-MCNC: 3.2 GM/DL (ref 2.4–3.5)
GLUCOSE SERPL-MCNC: 127 MG/DL (ref 82–115)
GLUCOSE UR QL STRIP: NORMAL
HCT VFR BLD AUTO: 31.6 % (ref 37–47)
HGB BLD-MCNC: 10 G/DL (ref 12–16)
HGB UR QL STRIP: ABNORMAL
HOLD SPECIMEN: NORMAL
HR MV ECHO: 75 BPM
HYALINE CASTS #/AREA URNS LPF: ABNORMAL /LPF
INTERVENTRICULAR SEPTUM: 1.58 CM (ref 0.6–1.1)
KETONES UR QL STRIP: NEGATIVE
LACTATE SERPL-SCNC: 2 MMOL/L (ref 0.5–2.2)
LACTATE SERPL-SCNC: 2 MMOL/L (ref 0.5–2.2)
LEFT ATRIUM SIZE: 3.85 CM
LEFT INTERNAL DIMENSION IN SYSTOLE: 2.66 CM (ref 2.1–4)
LEFT VENTRICLE DIASTOLIC VOLUME INDEX: 36.64 ML/M2
LEFT VENTRICLE DIASTOLIC VOLUME: 71.08 ML
LEFT VENTRICLE END DIASTOLIC VOLUME APICAL 2 CHAMBER: 59.47 ML
LEFT VENTRICLE END DIASTOLIC VOLUME APICAL 4 CHAMBER: 75.41 ML
LEFT VENTRICLE MASS INDEX: 106 G/M2
LEFT VENTRICLE SYSTOLIC VOLUME INDEX: 13.4 ML/M2
LEFT VENTRICLE SYSTOLIC VOLUME: 25.97 ML
LEFT VENTRICULAR INTERNAL DIMENSION IN DIASTOLE: 4.03 CM (ref 3.5–6)
LEFT VENTRICULAR MASS: 205.45 G
LEUKOCYTE ESTERASE UR QL STRIP: NEGATIVE
LIPASE SERPL-CCNC: 23 U/L
LV LATERAL E/E' RATIO: 11.57 M/S
LV SEPTAL E/E' RATIO: 10.13 M/S
LVED V (TEICH): 71.08 ML
LVES V (TEICH): 25.97 ML
LVOT MG: 3.04 MMHG
LVOT MV: 0.83 CM/S
LYMPHOCYTES NFR BLD MANUAL: 0.93 X10(3)/MCL
LYMPHOCYTES NFR BLD MANUAL: 5 % (ref 13–40)
MAGNESIUM SERPL-MCNC: 2.3 MG/DL (ref 1.6–2.6)
MCH RBC QN AUTO: 28.7 PG (ref 27–31)
MCHC RBC AUTO-ENTMCNC: 31.6 G/DL (ref 33–36)
MCV RBC AUTO: 90.8 FL (ref 80–94)
MONOCYTES NFR BLD MANUAL: 0.37 X10(3)/MCL (ref 0.1–1.3)
MONOCYTES NFR BLD MANUAL: 2 % (ref 2–11)
MUCOUS THREADS URNS QL MICRO: ABNORMAL /LPF
MV MEAN GRADIENT: 4 MMHG
MV PEAK A VEL: 1.45 M/S
MV PEAK E VEL: 0.81 M/S
MV PEAK GRADIENT: 10 MMHG
MV STENOSIS PRESSURE HALF TIME: 71.36 MS
MV VALVE AREA BY CONTINUITY EQUATION: 2.48 CM2
MV VALVE AREA P 1/2 METHOD: 3.08 CM2
NEUTROPHILS NFR BLD MANUAL: 75 % (ref 47–80)
NEUTS BAND NFR BLD MANUAL: 18 % (ref 0–11)
NITRITE UR QL STRIP: NEGATIVE
NRBC BLD AUTO-RTO: 0 %
OHS CV RV/LV RATIO: 0.63 CM
OHS LV EJECTION FRACTION SIMPSONS BIPLANE MOD: 69 %
OHS QRS DURATION: 94 MS
OHS QTC CALCULATION: 393 MS
PH UR STRIP: 7 [PH]
PISA AR MAX VEL: 2.3 M/S
PISA TR MAX VEL: 2.7 M/S
PLATELET # BLD AUTO: 286 X10(3)/MCL (ref 130–400)
PLATELET # BLD EST: ADEQUATE 10*3/UL
PMV BLD AUTO: 10.2 FL (ref 7.4–10.4)
POTASSIUM SERPL-SCNC: 3.6 MMOL/L (ref 3.5–5.1)
PROT SERPL-MCNC: 6.7 GM/DL (ref 5.8–7.6)
PROT UR QL STRIP: ABNORMAL
RA PRESSURE ESTIMATED: 8 MMHG
RBC # BLD AUTO: 3.48 X10(6)/MCL (ref 4.2–5.4)
RBC #/AREA URNS AUTO: ABNORMAL /HPF
RBC MORPH BLD: NORMAL
RIGHT VENTRICULAR END-DIASTOLIC DIMENSION: 2.55 CM
RSV A 5' UTR RNA NPH QL NAA+PROBE: NOT DETECTED
RV TB RVSP: 11 MMHG
SARS-COV-2 RNA RESP QL NAA+PROBE: NOT DETECTED
SODIUM SERPL-SCNC: 137 MMOL/L (ref 136–145)
SP GR UR STRIP.AUTO: 1.01 (ref 1–1.03)
SQUAMOUS #/AREA URNS LPF: ABNORMAL /HPF
TDI LATERAL: 0.07 M/S
TDI SEPTAL: 0.08 M/S
TDI: 0.08 M/S
TR MAX PG: 29 MMHG
TRICUSPID ANNULAR PLANE SYSTOLIC EXCURSION: 2.67 CM
TROPONIN I SERPL-MCNC: 0.56 NG/ML (ref 0–0.04)
TROPONIN I SERPL-MCNC: 0.58 NG/ML (ref 0–0.04)
TROPONIN I SERPL-MCNC: 0.59 NG/ML (ref 0–0.04)
TSH SERPL-ACNC: 0.94 UIU/ML (ref 0.35–4.94)
TV REST PULMONARY ARTERY PRESSURE: 37 MMHG
UROBILINOGEN UR STRIP-ACNC: NORMAL
WBC # BLD AUTO: 18.6 X10(3)/MCL (ref 4.5–11.5)
WBC #/AREA URNS AUTO: ABNORMAL /HPF
Z-SCORE OF LEFT VENTRICULAR DIMENSION IN END DIASTOLE: -3.13
Z-SCORE OF LEFT VENTRICULAR DIMENSION IN END SYSTOLE: -1.92

## 2024-07-17 PROCEDURE — 87040 BLOOD CULTURE FOR BACTERIA: CPT | Performed by: STUDENT IN AN ORGANIZED HEALTH CARE EDUCATION/TRAINING PROGRAM

## 2024-07-17 PROCEDURE — 63600175 PHARM REV CODE 636 W HCPCS

## 2024-07-17 PROCEDURE — 51701 INSERT BLADDER CATHETER: CPT

## 2024-07-17 PROCEDURE — 84484 ASSAY OF TROPONIN QUANT: CPT | Mod: 91

## 2024-07-17 PROCEDURE — 87186 SC STD MICRODIL/AGAR DIL: CPT | Performed by: STUDENT IN AN ORGANIZED HEALTH CARE EDUCATION/TRAINING PROGRAM

## 2024-07-17 PROCEDURE — 63600175 PHARM REV CODE 636 W HCPCS: Performed by: STUDENT IN AN ORGANIZED HEALTH CARE EDUCATION/TRAINING PROGRAM

## 2024-07-17 PROCEDURE — 25000003 PHARM REV CODE 250: Performed by: STUDENT IN AN ORGANIZED HEALTH CARE EDUCATION/TRAINING PROGRAM

## 2024-07-17 PROCEDURE — 83735 ASSAY OF MAGNESIUM: CPT | Performed by: STUDENT IN AN ORGANIZED HEALTH CARE EDUCATION/TRAINING PROGRAM

## 2024-07-17 PROCEDURE — 25000003 PHARM REV CODE 250

## 2024-07-17 PROCEDURE — 85027 COMPLETE CBC AUTOMATED: CPT | Performed by: STUDENT IN AN ORGANIZED HEALTH CARE EDUCATION/TRAINING PROGRAM

## 2024-07-17 PROCEDURE — 81001 URINALYSIS AUTO W/SCOPE: CPT | Performed by: STUDENT IN AN ORGANIZED HEALTH CARE EDUCATION/TRAINING PROGRAM

## 2024-07-17 PROCEDURE — 83605 ASSAY OF LACTIC ACID: CPT | Performed by: STUDENT IN AN ORGANIZED HEALTH CARE EDUCATION/TRAINING PROGRAM

## 2024-07-17 PROCEDURE — 96372 THER/PROPH/DIAG INJ SC/IM: CPT

## 2024-07-17 PROCEDURE — 0241U COVID/RSV/FLU A&B PCR: CPT | Performed by: STUDENT IN AN ORGANIZED HEALTH CARE EDUCATION/TRAINING PROGRAM

## 2024-07-17 PROCEDURE — G0378 HOSPITAL OBSERVATION PER HR: HCPCS

## 2024-07-17 PROCEDURE — 83605 ASSAY OF LACTIC ACID: CPT | Mod: 91

## 2024-07-17 PROCEDURE — 83690 ASSAY OF LIPASE: CPT | Performed by: STUDENT IN AN ORGANIZED HEALTH CARE EDUCATION/TRAINING PROGRAM

## 2024-07-17 PROCEDURE — 87154 CUL TYP ID BLD PTHGN 6+ TRGT: CPT | Performed by: STUDENT IN AN ORGANIZED HEALTH CARE EDUCATION/TRAINING PROGRAM

## 2024-07-17 PROCEDURE — 80053 COMPREHEN METABOLIC PANEL: CPT | Performed by: STUDENT IN AN ORGANIZED HEALTH CARE EDUCATION/TRAINING PROGRAM

## 2024-07-17 PROCEDURE — 83880 ASSAY OF NATRIURETIC PEPTIDE: CPT

## 2024-07-17 PROCEDURE — 25500020 PHARM REV CODE 255: Performed by: INTERNAL MEDICINE

## 2024-07-17 PROCEDURE — 84443 ASSAY THYROID STIM HORMONE: CPT | Performed by: STUDENT IN AN ORGANIZED HEALTH CARE EDUCATION/TRAINING PROGRAM

## 2024-07-17 PROCEDURE — 87077 CULTURE AEROBIC IDENTIFY: CPT | Performed by: STUDENT IN AN ORGANIZED HEALTH CARE EDUCATION/TRAINING PROGRAM

## 2024-07-17 PROCEDURE — 85007 BL SMEAR W/DIFF WBC COUNT: CPT | Performed by: STUDENT IN AN ORGANIZED HEALTH CARE EDUCATION/TRAINING PROGRAM

## 2024-07-17 PROCEDURE — 93005 ELECTROCARDIOGRAM TRACING: CPT

## 2024-07-17 PROCEDURE — 84484 ASSAY OF TROPONIN QUANT: CPT | Performed by: STUDENT IN AN ORGANIZED HEALTH CARE EDUCATION/TRAINING PROGRAM

## 2024-07-17 RX ORDER — TRIAMTERENE/HYDROCHLOROTHIAZID 37.5-25 MG
1 TABLET ORAL DAILY
Status: DISCONTINUED | OUTPATIENT
Start: 2024-07-18 | End: 2024-07-17

## 2024-07-17 RX ORDER — ONDANSETRON HYDROCHLORIDE 2 MG/ML
4 INJECTION, SOLUTION INTRAVENOUS EVERY 8 HOURS PRN
Status: DISCONTINUED | OUTPATIENT
Start: 2024-07-17 | End: 2024-07-23 | Stop reason: HOSPADM

## 2024-07-17 RX ORDER — TRIAMTERENE AND HYDROCHLOROTHIAZIDE 37.5; 25 MG/1; MG/1
1 CAPSULE ORAL DAILY
Status: DISCONTINUED | OUTPATIENT
Start: 2024-07-18 | End: 2024-07-23 | Stop reason: HOSPADM

## 2024-07-17 RX ORDER — IBUPROFEN 200 MG
16 TABLET ORAL
Status: DISCONTINUED | OUTPATIENT
Start: 2024-07-17 | End: 2024-07-23 | Stop reason: HOSPADM

## 2024-07-17 RX ORDER — ATORVASTATIN CALCIUM 20 MG/1
20 TABLET, FILM COATED ORAL DAILY
Status: DISCONTINUED | OUTPATIENT
Start: 2024-07-18 | End: 2024-07-23 | Stop reason: HOSPADM

## 2024-07-17 RX ORDER — GLUCAGON 1 MG
1 KIT INJECTION
Status: DISCONTINUED | OUTPATIENT
Start: 2024-07-17 | End: 2024-07-23 | Stop reason: HOSPADM

## 2024-07-17 RX ORDER — HEPARIN SODIUM 5000 [USP'U]/ML
5000 INJECTION, SOLUTION INTRAVENOUS; SUBCUTANEOUS EVERY 8 HOURS
Status: DISCONTINUED | OUTPATIENT
Start: 2024-07-17 | End: 2024-07-23 | Stop reason: HOSPADM

## 2024-07-17 RX ORDER — SODIUM CHLORIDE, SODIUM LACTATE, POTASSIUM CHLORIDE, CALCIUM CHLORIDE 600; 310; 30; 20 MG/100ML; MG/100ML; MG/100ML; MG/100ML
INJECTION, SOLUTION INTRAVENOUS CONTINUOUS
Status: ACTIVE | OUTPATIENT
Start: 2024-07-17 | End: 2024-07-18

## 2024-07-17 RX ORDER — LISINOPRIL 10 MG/1
40 TABLET ORAL DAILY
Status: DISCONTINUED | OUTPATIENT
Start: 2024-07-18 | End: 2024-07-17

## 2024-07-17 RX ORDER — AMLODIPINE BESYLATE 10 MG/1
10 TABLET ORAL DAILY
Status: DISCONTINUED | OUTPATIENT
Start: 2024-07-17 | End: 2024-07-23 | Stop reason: HOSPADM

## 2024-07-17 RX ORDER — SODIUM CHLORIDE, SODIUM LACTATE, POTASSIUM CHLORIDE, CALCIUM CHLORIDE 600; 310; 30; 20 MG/100ML; MG/100ML; MG/100ML; MG/100ML
INJECTION, SOLUTION INTRAVENOUS CONTINUOUS
Status: DISCONTINUED | OUTPATIENT
Start: 2024-07-17 | End: 2024-07-17

## 2024-07-17 RX ORDER — SODIUM CHLORIDE 0.9 % (FLUSH) 0.9 %
10 SYRINGE (ML) INJECTION EVERY 12 HOURS PRN
Status: DISCONTINUED | OUTPATIENT
Start: 2024-07-17 | End: 2024-07-23 | Stop reason: HOSPADM

## 2024-07-17 RX ORDER — ACETAMINOPHEN 325 MG/1
650 TABLET ORAL EVERY 6 HOURS PRN
Status: DISCONTINUED | OUTPATIENT
Start: 2024-07-17 | End: 2024-07-23 | Stop reason: HOSPADM

## 2024-07-17 RX ORDER — NALOXONE HCL 0.4 MG/ML
0.02 VIAL (ML) INJECTION
Status: DISCONTINUED | OUTPATIENT
Start: 2024-07-17 | End: 2024-07-23 | Stop reason: HOSPADM

## 2024-07-17 RX ORDER — IBUPROFEN 200 MG
24 TABLET ORAL
Status: DISCONTINUED | OUTPATIENT
Start: 2024-07-17 | End: 2024-07-23 | Stop reason: HOSPADM

## 2024-07-17 RX ADMIN — SODIUM CHLORIDE 500 ML: 9 INJECTION, SOLUTION INTRAVENOUS at 12:07

## 2024-07-17 RX ADMIN — IOHEXOL 100 ML: 350 INJECTION, SOLUTION INTRAVENOUS at 04:07

## 2024-07-17 RX ADMIN — HEPARIN SODIUM 5000 UNITS: 5000 INJECTION INTRAVENOUS; SUBCUTANEOUS at 02:07

## 2024-07-17 RX ADMIN — VANCOMYCIN HYDROCHLORIDE 1750 MG: 10 INJECTION, POWDER, LYOPHILIZED, FOR SOLUTION INTRAVENOUS at 01:07

## 2024-07-17 RX ADMIN — HEPARIN SODIUM 5000 UNITS: 5000 INJECTION INTRAVENOUS; SUBCUTANEOUS at 09:07

## 2024-07-17 RX ADMIN — ACETAMINOPHEN 650 MG: 325 TABLET, FILM COATED ORAL at 09:07

## 2024-07-17 RX ADMIN — PIPERACILLIN SODIUM AND TAZOBACTAM SODIUM 4.5 G: 4; .5 INJECTION, POWDER, LYOPHILIZED, FOR SOLUTION INTRAVENOUS at 12:07

## 2024-07-17 RX ADMIN — SODIUM CHLORIDE, POTASSIUM CHLORIDE, SODIUM LACTATE AND CALCIUM CHLORIDE: 600; 310; 30; 20 INJECTION, SOLUTION INTRAVENOUS at 03:07

## 2024-07-17 NOTE — H&P
[facility] Medicine Wards History & Physical Note     Resident Team: [facility] Medicine List 2  Attending Physician: Malathi Thapa MD  Resident: Marco Portillo    Date of Admit: 7/17/2024    Chief Complaint     Weakness, Shortness of Breath, and Dizziness (Patient reports chills, weakness, and shortness of breath since last night. 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 VSS)  . Patient reports that last night she began to experience episodes of chills. This morning when patient woke up was also having severe, diffuse weakness that made her concerned she would fall. Denies any headaches, vision changes, cold/congestion, sore throat, CP, SOB, abdominal pain, or urinary symptoms. No vomiting but does reports some nausea overnight. Also having mild, dry cough. No diarrhea. Patient does report intermittent pain in feet 2/2 gout, but is currently asymptomatic. States lives at home with family and usually able to ambulate on her own without any assistive devices.     In the ED, the patient was afebrile. HR 83, RR 18, /60. O2 98% on RA. CBC remarkable for leukocytosis of 18.6, H/H 10.0/31.6. BUN/Cr 33.3/1.26 (baseline Cr <1). Trop 0.565, LA 2.0, TSH wnl. COVID/FLU/RSV neg. UA trace protein and 1+ blood. CXR no acute process. EKG NSR. Blood cx pending. Patient was started on vanc and zosyn. Patient was admitted for leukocytosis of unknown origin.     Past Medical History:  Past Medical History:   Diagnosis Date    Essential (primary) hypertension     Heart disease        Past Surgical History:  Past Surgical History:   Procedure Laterality Date    HYSTERECTOMY      LEFT HEART  CATHETERIZATION  06/11/2021       Family History:  Family History   Problem Relation Name Age of Onset    Breast cancer Mother      Breast cancer Sister         Social History:  Social History     Tobacco Use    Smoking status: Never    Smokeless tobacco: Never   Substance Use Topics    Alcohol use: Never    Drug use: Never       Allergies:  Review of patient's allergies indicates:  No Known Allergies    Home Medications:  Prior to Admission medications    Medication Sig Start Date End Date Taking? Authorizing Provider   allopurinoL (ZYLOPRIM) 100 MG tablet Take 2 tablets (200 mg total) by mouth once daily. 4/25/24   Drea Ribeiro FNP   allopurinoL 200 mg Tab Take 200 mg by mouth once daily. 1/31/24   Drea Ribeiro FNP   amLODIPine (NORVASC) 10 MG tablet Take 1 tablet (10 mg total) by mouth once daily. 1/31/24 7/29/24  Drea Ribeiro FNP   benazepriL (LOTENSIN) 40 MG tablet Take 1 tablet (40 mg total) by mouth once daily. 1/31/24 7/29/24  Drea Ribeiro FNP   carbamide peroxide (DEBROX) 6.5 % otic solution Place 5 drops into both ears as needed. 3/22/24   Hermelindo Nguyen MD   diclofenac (VOLTAREN) 50 MG EC tablet Take 1 tablet (50 mg total) by mouth 2 (two) times daily as needed (pain). 1/31/24   Drea Ribeiro FNP   diclofenac sodium (VOLTAREN) 1 % Gel Apply 2 g topically 3 (three) times daily as needed (ankle pain). 7/26/23   Drea Ribeiro FNP   famotidine (PEPCID) 20 MG tablet Take 1 tablet (20 mg total) by mouth nightly as needed for Heartburn. 1/31/24   Drea Ribeiro FNP   magnesium hydroxide (MILK OF MAGNESIA ORAL) Take by mouth.    Provider, Historical   meclizine (ANTIVERT) 25 mg tablet Take 1 tablet (25 mg total) by mouth 3 (three) times daily as needed for Dizziness. 3/23/23   Nacho Baumann MD   simvastatin (ZOCOR) 40 MG tablet Take 1 tablet (40 mg total) by mouth every evening. 1/31/24   Duplechain, Drea C, FNP   triamterene-hydrochlorothiazide 37.5-25 mg (DYAZIDE)  "37.5-25 mg per capsule Take 1 capsule by mouth once daily. 24  Drea Ribeiro, FNP       Review of Systems   Constitutional:  Positive for chills and malaise/fatigue. Negative for fever.   Respiratory:  Positive for cough. Negative for sputum production, shortness of breath and wheezing.    Cardiovascular:  Positive for leg swelling. Negative for chest pain and palpitations.   Gastrointestinal:  Positive for nausea. Negative for abdominal pain, diarrhea and vomiting.   Genitourinary:  Negative for dysuria and hematuria.   Musculoskeletal:  Negative for falls.   Neurological:  Positive for weakness. Negative for focal weakness and headaches.            Objective:   Last 24 Hour Vital Signs:  BP  Min: 116/60  Max: 144/57  Temp  Av.6 °F (37 °C)  Min: 97.3 °F (36.3 °C)  Max: 99.9 °F (37.7 °C)  Pulse  Av.5  Min: 74  Max: 83  Resp  Av.5  Min: 18  Max: 27  SpO2  Av %  Min: 94 %  Max: 98 %  Height  Av' 4" (162.6 cm)  Min: 5' 4" (162.6 cm)  Max: 5' 4" (162.6 cm)  Weight  Av.7 kg (197 lb 10.3 oz)  Min: 89.7 kg (197 lb 10.3 oz)  Max: 89.7 kg (197 lb 10.3 oz)  Body mass index is 33.93 kg/m².  No intake/output data recorded.    Physical Exam  Vitals and nursing note reviewed.   Constitutional:       General: She is not in acute distress.     Appearance: She is not ill-appearing, toxic-appearing or diaphoretic.   HENT:      Head: Normocephalic and atraumatic.      Nose: Nose normal.      Mouth/Throat:      Mouth: Mucous membranes are moist.      Pharynx: Oropharynx is clear. Uvula midline. No oropharyngeal exudate or posterior oropharyngeal erythema.   Eyes:      General: No scleral icterus.     Conjunctiva/sclera: Conjunctivae normal.   Cardiovascular:      Rate and Rhythm: Normal rate and regular rhythm.      Heart sounds: Normal heart sounds. Heart sounds not distant. No murmur heard.     No friction rub. No gallop.   Pulmonary:      Effort: Pulmonary effort is normal. No prolonged " "expiration or respiratory distress.      Breath sounds: Normal breath sounds and air entry. No decreased breath sounds or wheezing.   Abdominal:      General: Abdomen is flat. Bowel sounds are normal. There is no distension.      Palpations: Abdomen is soft.      Tenderness: There is no abdominal tenderness. There is no guarding.   Musculoskeletal:      Cervical back: Normal range of motion. No rigidity.      Right lower le+ Pitting Edema present.      Left lower le+ Pitting Edema present.   Lymphadenopathy:      Cervical: No cervical adenopathy.   Skin:     General: Skin is warm.      Capillary Refill: Capillary refill takes less than 2 seconds.      Findings: No wound.   Neurological:      Mental Status: She is alert.       Laboratory:  Most Recent Data:  CBC:   Lab Results   Component Value Date    WBC 18.60 (H) 2024    HGB 10.0 (L) 2024    HCT 31.6 (L) 2024     2024    MCV 90.8 2024    RDW 14.8 2024     WBC Differential:   Recent Labs   Lab 07/15/24  0701 24  1102   WBC 6.94 18.60*   HGB 10.7* 10.0*   HCT 33.6* 31.6*    286   MCV 92.3 90.8     BMP:   Lab Results   Component Value Date     2024    K 3.6 2024     2024    CO2 26 2024    BUN 33.3 (H) 2024    CREATININE 1.26 (H) 2024    CALCIUM 9.2 2024    MG 2.30 2024     LFTs:   Lab Results   Component Value Date    ALBUMIN 3.5 2024    BILITOT 1.2 2024    AST 27 2024    ALKPHOS 69 2024    ALT 14 2024     Coags: No results found for: "INR", "PROTIME", "PTT"  FLP:   Lab Results   Component Value Date    CHOL 173 07/15/2024    HDL 45 07/15/2024    TRIG 100 07/15/2024     DM:   Lab Results   Component Value Date    CREATININE 1.26 (H) 2024     Thyroid:   Lab Results   Component Value Date    TSH 0.936 2024      Anemia: No results found for: "IRON", "TIBC", "FERRITIN", "SATURATEDIRO"  No results found for: " ""[id number]"  No results found for: "FOLATE"     Cardiac:   Lab Results   Component Value Date    TROPONINI 0.565 (H) 07/17/2024     Urinalysis:   Lab Results   Component Value Date    COLORU Yellow 07/17/2024    SPECGRAV 1.015 08/09/2023    NITRITE Negative 07/17/2024    KETONESU Negative 08/09/2023    UROBILINOGEN Normal 07/17/2024    WBCUA 0-5 07/17/2024       Trended Lab Data:  Recent Labs   Lab 07/15/24  0701 07/17/24  1102   WBC 6.94 18.60*   HGB 10.7* 10.0*   HCT 33.6* 31.6*    286   MCV 92.3 90.8   RDW 14.6 14.8    137   K 4.0 3.6    103   CO2 27 26   BUN 34.7* 33.3*   CREATININE 1.17* 1.26*   ALBUMIN 3.8 3.5   BILITOT 0.9 1.2   AST 17 27   ALKPHOS 64 69   ALT 11 14       Trended Cardiac Data:  Recent Labs   Lab 07/17/24  1102   TROPONINI 0.565*       Microbiology Data:  Microbiology Results (last 7 days)       Procedure Component Value Units Date/Time    Blood culture #1 **CANNOT BE ORDERED STAT** [[id number]] Collected: 07/17/24 1232    Order Status: Sent Specimen: Blood from Arm, Right Updated: 07/17/24 1236    Blood culture #2 **CANNOT BE ORDERED STAT** [[id number]] Collected: 07/17/24 1232    Order Status: Sent Specimen: Blood from Hand, Right Updated: 07/17/24 1236             Other Results:  EKG (my interpretation): appears normal, NSR    Radiology:  Imaging Results              X-Ray Chest AP Portable (Final result)  Result time 07/17/24 12:12:57      Final result by Beni Medeiros MD (07/17/24 12:12:57)                   Impression:      No acute chest disease is identified..    No significant abnormalities identified      Electronically signed by: Beni Medeiros  Date:    07/17/2024  Time:    12:12               Narrative:    EXAMINATION:  XR CHEST AP PORTABLE    CLINICAL HISTORY:  cough;, .    FINDINGS:  No alveolar consolidation, effusion, or pneumothorax is seen.   The thoracic aorta is normal  cardiac silhouette is at the upper limits of normal, central pulmonary " vessels and mediastinum are normal in size and are grossly unremarkable.   visualized osseous structures are grossly unremarkable.                                     Assessment & Plan:     Leukocytosis       - WBC 18.60       - CXR no signs of infectious process, UA unremarkable, no visible skin wounds       - Patient has hx of chronic kidney cysts with last imaging 3/2023       - CT abdomen pelvis w wo contrast to evaluate for possible renal abscess       - Blood cx pending       - Continue vanc, zosyn for empiric coverage    DEVIN        - Cr 1.26 today, baseline <1        - Patient reports decreased fluid intake        - Given patient's peripheral edema will continue with gentle fluid resuscitation    Peripheral edema       - Chronic, patient reports has at baseline at home       - Last Echo 8/202       - Repeat echo today       - No symptoms of SOB, no crackles on PE, continue to monitor and will diurese as needed    HTN       - On triamterene-HCTZ 37.5-25 mg daily, benzapril 40 mg daily, and amlodipine 10 mg daily at home       - Will hold ACE given patient's DEVIN at this time       - Resume other home meds      CODE STATUS: Full  Access: PIV  Antibiotics: Vanc, zosyn  Diet: Cardiac  DVT Prophylaxis: Heparin  GI Prophylaxis: None  Fluids: LR @ 100 cc/hr x 10 hrs      Disposition: Leukocytosis of unknown source. CT abdomen pelvis w wo contrast for further evaluation of known kidney cysts. Continue empiric abx coverage.       Rm Portillo MD  [facility]

## 2024-07-17 NOTE — PROGRESS NOTES
"Pharmacokinetic Initial Assessment: IV Vancomycin    Assessment/Plan:    Initiate intravenous vancomycin with loading dose of 1750 mg once followed by a maintenance dose of vancomycin 1000mg IV every 24 hours  Desired empiric serum trough concentration is 15 to 20 mcg/mL  Draw vancomycin random level on 7/19/24 at 1400.  Pharmacy will continue to follow and monitor vancomycin.      Please contact pharmacy at extension [phone] with any questions regarding this assessment.     Thank you for the consult,   Shelli Plascencia       Patient brief summary:  Leda Gallardo is a 89 y.o. female initiated on antimicrobial therapy with IV Vancomycin for treatment of suspected sepsis    Drug Allergies:   Review of patient's allergies indicates:  No Known Allergies    Actual Body Weight:   89.7kg    Renal Function:   Estimated Creatinine Clearance: 34.1 mL/min (A) (based on SCr of 1.26 mg/dL (H)).,     CBC (last 72 hours):  Recent Labs   Lab Result Units 07/15/24  0701 07/17/24  1102   WBC x10(3)/mcL 6.94 18.60*   Hgb g/dL 10.7* 10.0*   Hct % 33.6* 31.6*   Platelet x10(3)/mcL 323 286   Mono % % 8.4  --    Monocytes % %  --  2   Eos % % 12.1  --    Basophil % % 0.4  --        Metabolic Panel (last 72 hours):  Recent Labs   Lab Result Units 07/15/24  0701 07/17/24  1102   Sodium mmol/L 141 137   Potassium mmol/L 4.0 3.6   Chloride mmol/L 106 103   CO2 mmol/L 27 26   Glucose mg/dL 105 127*   Glucose, UA  Negative  --    Blood Urea Nitrogen mg/dL 34.7* 33.3*   Creatinine mg/dL 1.17* 1.26*   Albumin g/dL 3.8 3.5   Bilirubin Total mg/dL 0.9 1.2   ALP unit/L 64 69   AST unit/L 17 27   ALT unit/L 11 14   Magnesium Level mg/dL  --  2.30       Drug levels (last 3 results):  No results for input(s): "VANCOMYCINRA", "VANCORANDOM", "VANCOMYCINPE", "VANCOPEAK", "VANCOMYCINTR", "VANCOTROUGH" in the last 72 hours.    Microbiologic Results:  Microbiology Results (last 7 days)       Procedure Component Value Units Date/Time    Blood culture #1 " **CANNOT BE ORDERED STAT** [[id number]] Collected: 07/17/24 1232    Order Status: Sent Specimen: Blood from Arm, Right Updated: 07/17/24 1236    Blood culture #2 **CANNOT BE ORDERED STAT** [[id number]] Collected: 07/17/24 1232    Order Status: Sent Specimen: Blood from Hand, Right Updated: 07/17/24 1236

## 2024-07-17 NOTE — ED PROVIDER NOTES
Encounter Date: 7/17/2024       History     Chief Complaint   Patient presents with    Weakness    Shortness of Breath    Dizziness     Patient reports chills, weakness, and shortness of breath since last night. VSS     Patient presents to the emergency department due to fatigue.  Last night she noticed she was cold and having chills and then this morning she was very weak and fatigued.  She also has a dry cough.  She denies any chest pain.  No fevers that she has measured.  No sore throat.  No abdominal pain nausea or vomiting.  No diarrhea.  No dysuria or hematuria.    The history is provided by the patient.     Review of patient's allergies indicates:  No Known Allergies  Past Medical History:   Diagnosis Date    Essential (primary) hypertension     Heart disease      Past Surgical History:   Procedure Laterality Date    HYSTERECTOMY      LEFT HEART CATHETERIZATION  06/11/2021     Family History   Problem Relation Name Age of Onset    Breast cancer Mother      Breast cancer Sister       Social History     Tobacco Use    Smoking status: Never    Smokeless tobacco: Never   Substance Use Topics    Alcohol use: Never    Drug use: Never     Review of Systems   Constitutional:  Positive for chills and fatigue. Negative for fever.   HENT:  Negative for congestion and sore throat.    Respiratory:  Positive for cough. Negative for shortness of breath.    Cardiovascular:  Negative for chest pain and palpitations.   Gastrointestinal:  Negative for abdominal pain and nausea.   Genitourinary:  Negative for dysuria and hematuria.   Musculoskeletal:  Negative for arthralgias and myalgias.   Neurological:  Positive for weakness. Negative for dizziness.       Physical Exam     Initial Vitals [07/17/24 0934]   BP Pulse Resp Temp SpO2   116/60 83 18 97.3 °F (36.3 °C) 98 %      MAP       --         Physical Exam    Nursing note and vitals reviewed.  Constitutional: She appears well-developed and well-nourished.   HENT:   Head:  Normocephalic and atraumatic.   Eyes: EOM are normal. Pupils are equal, round, and reactive to light.   Neck: Neck supple.   Normal range of motion.  Cardiovascular:  Normal rate, regular rhythm and normal heart sounds.           Pulmonary/Chest: Breath sounds normal. No respiratory distress. She has no wheezes. She has no rhonchi. She has no rales.   Abdominal: Abdomen is soft. She exhibits no distension. There is no abdominal tenderness. There is no rebound and no guarding.   Musculoskeletal:         General: No edema. Normal range of motion.      Cervical back: Normal range of motion and neck supple.     Neurological: She is alert and oriented to person, place, and time.   Skin: Skin is warm and dry.         ED Course   Critical Care    Date/Time: 7/17/2024 3:18 PM    Performed by: Swapnil Mendieta MD  Authorized by: Swapnil Mendieta MD  Direct patient critical care time: 15 minutes  Additional history critical care time: 5 minutes  Ordering / reviewing critical care time: 4 minutes  Documentation critical care time: 6 minutes  Consulting other physicians critical care time: 5 minutes  Consult with family critical care time: 0 minutes  Other critical care time: 0 minutes  Total critical care time (exclusive of procedural time) : 35 minutes  Critical care time was exclusive of separately billable procedures and treating other patients and teaching time.  Critical care was necessary to treat or prevent imminent or life-threatening deterioration of the following conditions: sepsis.  Critical care was time spent personally by me on the following activities: blood draw for specimens, development of treatment plan with patient or surrogate, discussions with consultants, interpretation of cardiac output measurements, evaluation of patient's response to treatment, examination of patient, obtaining history from patient or surrogate, ordering and performing treatments and interventions, ordering and review of laboratory  studies, ordering and review of radiographic studies, pulse oximetry, re-evaluation of patient's condition and review of old charts.        Labs Reviewed   COMPREHENSIVE METABOLIC PANEL - Abnormal; Notable for the following components:       Result Value    Glucose 127 (*)     Blood Urea Nitrogen 33.3 (*)     Creatinine 1.26 (*)     All other components within normal limits   TROPONIN I - Abnormal; Notable for the following components:    Troponin-I 0.565 (*)     All other components within normal limits   URINALYSIS, REFLEX TO URINE CULTURE - Abnormal; Notable for the following components:    Protein, UA Trace (*)     Blood, UA 1+ (*)     Squamous Epithelial Cells, UA Trace (*)     Mucous, UA Trace (*)     All other components within normal limits   CBC WITH DIFFERENTIAL - Abnormal; Notable for the following components:    WBC 18.60 (*)     RBC 3.48 (*)     Hgb 10.0 (*)     Hct 31.6 (*)     MCHC 31.6 (*)     All other components within normal limits   MANUAL DIFFERENTIAL - Abnormal; Notable for the following components:    Bands % 18 (*)     Lymphs % 5 (*)     Neutrophils Abs Calc 17.298 (*)     All other components within normal limits   B-TYPE NATRIURETIC PEPTIDE - Abnormal; Notable for the following components:    Natriuretic Peptide 292.5 (*)     All other components within normal limits   COVID/RSV/FLU A&B PCR - Normal    Narrative:     The Xpert Xpress SARS-CoV-2/FLU/RSV plus is a rapid, multiplexed real-time PCR test intended for the simultaneous qualitative detection and differentiation of SARS-CoV-2, Influenza A, Influenza B, and respiratory syncytial virus (RSV) viral RNA in either nasopharyngeal swab or nasal swab specimens.         LACTIC ACID, PLASMA - Normal   LIPASE - Normal   MAGNESIUM - Normal   TSH - Normal   BLOOD CULTURE [facility]   BLOOD CULTURE [facility]   CBC W/ AUTO DIFFERENTIAL    Narrative:     The following orders were created for panel order CBC auto differential.  Procedure                                Abnormality         Status                     ---------                               -----------         ------                     CBC with Differential[[id number]]       Abnormal            Final result               Manual Differential[[id number]]         Abnormal            Final result                 Please view results for these tests on the individual orders.   EXTRA TUBES    Narrative:     The following orders were created for panel order EXTRA TUBES.  Procedure                               Abnormality         Status                     ---------                               -----------         ------                     Light Blue Top Hold[[id number]]                             Final result               Gold Top Hold[[id number]]                                   Final result               Pink Top Hold[[id number]]                                   Final result                 Please view results for these tests on the individual orders.   LIGHT BLUE TOP HOLD   GOLD TOP HOLD   PINK TOP HOLD   LACTIC ACID, PLASMA     EKG Readings: (Independently Interpreted)   Initial Reading: No STEMI. Rhythm: Normal Sinus Rhythm. Heart Rate: 70. Ectopy: No Ectopy. Conduction: Normal. Axis: Normal.     ECG Results              EKG 12-lead (Weakness) Age > 50 (In process)        Collection Time Result Time QRS Duration OHS QTC Calculation    07/17/24 10:31:43 07/17/24 10:58:09 94 393                     In process by Interface, Lab In [facility] (07/17/24 10:58:15)                   Narrative:    Test Reason : R53.1,    Vent. Rate : 070 BPM     Atrial Rate : 070 BPM     P-R Int : 186 ms          QRS Dur : 094 ms      QT Int : 364 ms       P-R-T Axes : 053 025 036 degrees     QTc Int : 393 ms    Normal sinus rhythm  Normal ECG  When compared with ECG of 23-MAR-2023 03:03,  No significant change was found    Referred By: [email or web address]   SELF           Confirmed By:                                   Imaging Results               X-Ray Chest AP Portable (Final result)  Result time 07/17/24 12:12:57      Final result by Beni Medeiros MD (07/17/24 12:12:57)                   Impression:      No acute chest disease is identified..    No significant abnormalities identified      Electronically signed by: Beni Medeiros  Date:    07/17/2024  Time:    12:12               Narrative:    EXAMINATION:  XR CHEST AP PORTABLE    CLINICAL HISTORY:  cough;, .    FINDINGS:  No alveolar consolidation, effusion, or pneumothorax is seen.   The thoracic aorta is normal  cardiac silhouette is at the upper limits of normal, central pulmonary vessels and mediastinum are normal in size and are grossly unremarkable.   visualized osseous structures are grossly unremarkable.                                       Medications   vancomycin (VANCOCIN) 1,000 mg in D5W 250 mL IVPB (has no administration in time range)   triamterene-hydrochlorothiazide 37.5-25 mg per tablet 1 tablet (has no administration in time range)   amLODIPine tablet 10 mg (10 mg Oral Not Given 7/17/24 1400)   atorvastatin tablet 20 mg (has no administration in time range)   sodium chloride 0.9% flush 10 mL (has no administration in time range)   naloxone 0.4 mg/mL injection 0.02 mg (has no administration in time range)   glucose chewable tablet 16 g (has no administration in time range)   glucose chewable tablet 24 g (has no administration in time range)   glucagon (human recombinant) injection 1 mg (has no administration in time range)   heparin (porcine) injection 5,000 Units (5,000 Units Subcutaneous Given 7/17/24 1419)   ondansetron injection 4 mg (has no administration in time range)   dextrose 10% bolus 125 mL 125 mL (has no administration in time range)   dextrose 10% bolus 250 mL 250 mL (has no administration in time range)   lactated ringers infusion (has no administration in time range)   iohexoL (OMNIPAQUE 350) 350 mg iodine/mL injection (has no administration in time range)    sodium chloride 0.9% bolus 500 mL 500 mL (0 mLs Intravenous Stopped 7/17/24 1340)   piperacillin-tazobactam (ZOSYN) 4.5 g in D5W 100 mL IVPB (MB+) (0 g Intravenous Stopped 7/17/24 1310)   vancomycin (VANCOCIN) 1,750 mg in D5W 500 mL IVPB (1,750 mg Intravenous New Bag 7/17/24 1314)     Medical Decision Making  Vital signs are stable.  EKGs without acute ischemic changes.  CBC returns with a significantly elevated white count at 18, with 18% bands.  CMP with mildly elevated creatinine above baseline.  Troponin is elevated at 0.5, patient is not having any current chest pain or shortness of breath.  Chest x-ray is generally clear.  Urine without evidence of infection.  Lactic within normal limits.  Given her bandemia, concerning for sepsis, no current source, she received IV fluids, does not meet criteria for 30 she was per kilos of IV fluids, and started on broad-spectrum antibiotics.  Discussed the patient with Internal Medicine who will admit for further evaluation.    Amount and/or Complexity of Data Reviewed  Labs: ordered. Decision-making details documented in ED Course.  Radiology: ordered. Decision-making details documented in ED Course.  ECG/medicine tests: ordered and independent interpretation performed. Decision-making details documented in ED Course.      Additional MDM:   Differential Diagnosis:   Hypothyroidism, medication side effect, orthostatic weakness, kidney failure, stroke, among others                                     Clinical Impression:  Final diagnoses:  [R53.1] Weakness  [R53.83] Fatigue  [A41.9] Sepsis, due to unspecified organism, unspecified whether acute organ dysfunction present (Primary)  [R79.89] Elevated troponin I level          ED Disposition Condition    Observation                 Swapnil Mendieta MD  07/17/24 3115

## 2024-07-18 LAB
ACINETOBACTER CALCOACETICUS-BAUMANNII COMPLEX (OHS): NOT DETECTED
ALBUMIN SERPL-MCNC: 3 G/DL (ref 3.4–4.8)
ALBUMIN/GLOB SERPL: 0.9 RATIO (ref 1.1–2)
ALP SERPL-CCNC: 68 UNIT/L (ref 40–150)
ALT SERPL-CCNC: 13 UNIT/L (ref 0–55)
ANION GAP SERPL CALC-SCNC: 8 MEQ/L
AST SERPL-CCNC: 24 UNIT/L (ref 5–34)
BACTEROIDES FRAGILIS (OHS): NOT DETECTED
BASOPHILS # BLD AUTO: 0.05 X10(3)/MCL
BASOPHILS NFR BLD AUTO: 0.4 %
BILIRUB SERPL-MCNC: 1.4 MG/DL
BUN SERPL-MCNC: 24.8 MG/DL (ref 9.8–20.1)
C AURIS DNA BLD POS QL NAA+NON-PROBE: NOT DETECTED
C GATTII+NEOFOR DNA CSF QL NAA+NON-PROBE: NOT DETECTED
CALCIUM SERPL-MCNC: 9.2 MG/DL (ref 8.4–10.2)
CANDIDA ALBICANS (OHS): NOT DETECTED
CANDIDA GLABRATA (OHS): NOT DETECTED
CANDIDA KRUSEI (OHS): NOT DETECTED
CANDIDA PARAPSILOSIS (OHS): NOT DETECTED
CANDIDA TROPICALIS (OHS): NOT DETECTED
CHLORIDE SERPL-SCNC: 106 MMOL/L (ref 98–107)
CO2 SERPL-SCNC: 24 MMOL/L (ref 23–31)
CREAT SERPL-MCNC: 0.94 MG/DL (ref 0.55–1.02)
CREAT/UREA NIT SERPL: 26
CTX-M (OHS): NOT DETECTED
ENTEROBACTER CLOACAE COMPLEX (OHS): NOT DETECTED
ENTEROBACTERALES (OHS): DETECTED
ENTEROCOCCUS FAECALIS (OHS): NOT DETECTED
ENTEROCOCCUS FAECIUM (OHS): NOT DETECTED
EOSINOPHIL # BLD AUTO: 0.18 X10(3)/MCL (ref 0–0.9)
EOSINOPHIL NFR BLD AUTO: 1.4 %
ERYTHROCYTE [DISTWIDTH] IN BLOOD BY AUTOMATED COUNT: 15.1 % (ref 11.5–17)
ESCHERICHIA COLI (OHS): NOT DETECTED
GFR SERPLBLD CREATININE-BSD FMLA CKD-EPI: 59 ML/MIN/1.73/M2
GLOBULIN SER-MCNC: 3.3 GM/DL (ref 2.4–3.5)
GLUCOSE SERPL-MCNC: 89 MG/DL (ref 82–115)
GP B STREP DNA CSF QL NAA+NON-PROBE: NOT DETECTED
HAEM INFLU DNA CSF QL NAA+NON-PROBE: NOT DETECTED
HCT VFR BLD AUTO: 29.4 % (ref 37–47)
HGB BLD-MCNC: 9.4 G/DL (ref 12–16)
IMM GRANULOCYTES # BLD AUTO: 0.05 X10(3)/MCL (ref 0–0.04)
IMM GRANULOCYTES NFR BLD AUTO: 0.4 %
IMP (OHS): NOT DETECTED
KLEBSIELLA AEROGENES (OHS): NOT DETECTED
KLEBSIELLA OXYTOCA (OHS): NOT DETECTED
KLEBSIELLA PNEUMONIAE GROUP (OHS): NOT DETECTED
KPC (OHS): NOT DETECTED
L MONOCYTOG DNA CSF QL NAA+NON-PROBE: NOT DETECTED
LYMPHOCYTES # BLD AUTO: 1.22 X10(3)/MCL (ref 0.6–4.6)
LYMPHOCYTES NFR BLD AUTO: 9.4 %
MCH RBC QN AUTO: 29.1 PG (ref 27–31)
MCHC RBC AUTO-ENTMCNC: 32 G/DL (ref 33–36)
MCR-1 (OHS): ABNORMAL
MCV RBC AUTO: 91 FL (ref 80–94)
MECA/C (OHS): ABNORMAL
MECA/C AND MREJ (MRSA)(OHS): ABNORMAL
MONOCYTES # BLD AUTO: 0.63 X10(3)/MCL (ref 0.1–1.3)
MONOCYTES NFR BLD AUTO: 4.8 %
N MEN DNA CSF QL NAA+NON-PROBE: NOT DETECTED
NDM (OHS): NOT DETECTED
NEUTROPHILS # BLD AUTO: 10.9 X10(3)/MCL (ref 2.1–9.2)
NEUTROPHILS NFR BLD AUTO: 83.6 %
NRBC BLD AUTO-RTO: 0 %
OXA-48-LIKE (OHS): NOT DETECTED
PLATELET # BLD AUTO: 236 X10(3)/MCL (ref 130–400)
PMV BLD AUTO: 10.6 FL (ref 7.4–10.4)
POTASSIUM SERPL-SCNC: 3.5 MMOL/L (ref 3.5–5.1)
PROT SERPL-MCNC: 6.3 GM/DL (ref 5.8–7.6)
PROTEUS SPP. (OHS): NOT DETECTED
PSEUDOMONAS AERUGINOSA (OHS): DETECTED
RBC # BLD AUTO: 3.23 X10(6)/MCL (ref 4.2–5.4)
S ENT+BONG DNA STL QL NAA+NON-PROBE: NOT DETECTED
S PNEUM DNA CSF QL NAA+NON-PROBE: NOT DETECTED
SERRATIA MARCESCENS (OHS): NOT DETECTED
SODIUM SERPL-SCNC: 138 MMOL/L (ref 136–145)
STAPHYLOCOCCUS AUREUS (OHS): NOT DETECTED
STAPHYLOCOCCUS EPIDERMIDIS (OHS): NOT DETECTED
STAPHYLOCOCCUS LUGDUNENSIS (OHS): NOT DETECTED
STAPHYLOCOCCUS SPP. (OHS): NOT DETECTED
STENOTROPHOMONAS MALTOPHILIA (OHS): NOT DETECTED
STREPTOCOCCUS PYOGENES (GROUP A)(OHS): NOT DETECTED
STREPTOCOCCUS SPP. (OHS): NOT DETECTED
VANA/B (OHS): ABNORMAL
VIM (OHS): NOT DETECTED
WBC # BLD AUTO: 13.03 X10(3)/MCL (ref 4.5–11.5)

## 2024-07-18 PROCEDURE — 63600175 PHARM REV CODE 636 W HCPCS

## 2024-07-18 PROCEDURE — G0378 HOSPITAL OBSERVATION PER HR: HCPCS

## 2024-07-18 PROCEDURE — 25000003 PHARM REV CODE 250: Performed by: INTERNAL MEDICINE

## 2024-07-18 PROCEDURE — 25000003 PHARM REV CODE 250

## 2024-07-18 PROCEDURE — 85025 COMPLETE CBC W/AUTO DIFF WBC: CPT

## 2024-07-18 PROCEDURE — 96372 THER/PROPH/DIAG INJ SC/IM: CPT

## 2024-07-18 PROCEDURE — 80053 COMPREHEN METABOLIC PANEL: CPT

## 2024-07-18 RX ORDER — SODIUM CHLORIDE, SODIUM LACTATE, POTASSIUM CHLORIDE, CALCIUM CHLORIDE 600; 310; 30; 20 MG/100ML; MG/100ML; MG/100ML; MG/100ML
INJECTION, SOLUTION INTRAVENOUS CONTINUOUS
Status: DISCONTINUED | OUTPATIENT
Start: 2024-07-18 | End: 2024-07-23 | Stop reason: HOSPADM

## 2024-07-18 RX ADMIN — ATORVASTATIN CALCIUM 20 MG: 20 TABLET, FILM COATED ORAL at 08:07

## 2024-07-18 RX ADMIN — VANCOMYCIN HYDROCHLORIDE 1000 MG: 1 INJECTION, POWDER, LYOPHILIZED, FOR SOLUTION INTRAVENOUS at 03:07

## 2024-07-18 RX ADMIN — HEPARIN SODIUM 5000 UNITS: 5000 INJECTION INTRAVENOUS; SUBCUTANEOUS at 01:07

## 2024-07-18 RX ADMIN — SODIUM CHLORIDE, POTASSIUM CHLORIDE, SODIUM LACTATE AND CALCIUM CHLORIDE: 600; 310; 30; 20 INJECTION, SOLUTION INTRAVENOUS at 01:07

## 2024-07-18 RX ADMIN — HEPARIN SODIUM 5000 UNITS: 5000 INJECTION INTRAVENOUS; SUBCUTANEOUS at 09:07

## 2024-07-18 RX ADMIN — TRIAMTERENE AND HYDROCHLOROTHIAZIDE 1 TABLET: 37.5; 25 CAPSULE ORAL at 08:07

## 2024-07-18 RX ADMIN — AMLODIPINE BESYLATE 10 MG: 10 TABLET ORAL at 08:07

## 2024-07-18 RX ADMIN — HEPARIN SODIUM 5000 UNITS: 5000 INJECTION INTRAVENOUS; SUBCUTANEOUS at 06:07

## 2024-07-18 NOTE — PROGRESS NOTES
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 VSS)  . Patient reports that last night she began to experience episodes of chills. This morning when patient woke up was also having severe, diffuse weakness that made her concerned she would fall. Denies any headaches, vision changes, cold/congestion, sore throat, CP, SOB, abdominal pain, or urinary symptoms. No vomiting but does reports some nausea overnight. Also having mild, dry cough. No diarrhea. Patient does report intermittent pain in feet 2/2 gout, but is currently asymptomatic. States lives at home with family and usually able to ambulate on her own without any assistive devices.      In the ED, the patient was afebrile. HR 83, RR 18, /60. O2 98% on RA. CBC remarkable for leukocytosis of 18.6, H/H 10.0/31.6. BUN/Cr 33.3/1.26 (baseline Cr <1). Trop 0.565, LA 2.0, TSH wnl. COVID/FLU/RSV neg. UA trace protein and 1+ blood. CXR no acute process. EKG NSR. Blood cx pending. Patient was started on vanc and zosyn. Patient was admitted for leukocytosis of unknown origin.     Interval History: Patient febrile to 101.2. Reports overall feeling much better, decreased weakness. No symptoms, only some chronic dry cough.       Review of Systems:  ROS completed and negative except as indicated above.     Objective:     Last 24 Hour Vital Signs:  BP  Min: 125/52  Max: 142/56  Temp  Av.4 °F (37.4 °C)  Min: 98.6 °F (37 °C)  Max: 101.2 °F (38.4 °C)  Pulse  Av  Min: 69  Max: 76  Resp  Av.2  Min: 12  Max:  30  SpO2  Av.9 %  Min: 94 %  Max: 97 %  I/O last 3 completed shifts:  In: -   Out: 1200 [Urine:1200]    Physical Exam  Vitals and nursing note reviewed.   Constitutional:       General: She is awake. She is not in acute distress.     Appearance: She is not ill-appearing, toxic-appearing or diaphoretic.   Eyes:      Conjunctiva/sclera: Conjunctivae normal.   Cardiovascular:      Rate and Rhythm: Normal rate and regular rhythm.      Heart sounds: Normal heart sounds. Heart sounds not distant. No murmur heard.     No friction rub. No gallop.   Pulmonary:      Effort: Pulmonary effort is normal. No respiratory distress.      Breath sounds: Normal breath sounds and air entry. No decreased breath sounds or wheezing.   Abdominal:      General: Abdomen is flat. Bowel sounds are normal. There is no distension.      Palpations: Abdomen is soft.      Tenderness: There is no abdominal tenderness. There is no guarding.   Musculoskeletal:      Cervical back: Normal range of motion.      Right lower le+ Edema present.      Left lower le+ Edema present.   Skin:     General: Skin is warm.      Capillary Refill: Capillary refill takes less than 2 seconds.   Neurological:      Mental Status: She is alert.   Psychiatric:         Behavior: Behavior is cooperative.       Laboratory:  Most Recent Data:  CBC:   Lab Results   Component Value Date    WBC 13.03 (H) 2024    HGB 9.4 (L) 2024    HCT 29.4 (L) 2024     2024    MCV 91.0 2024    RDW 15.1 2024     WBC Differential:   Recent Labs   Lab 07/15/24  0701 24  1102 24  0336   WBC 6.94 18.60* 13.03*   HGB 10.7* 10.0* 9.4*   HCT 33.6* 31.6* 29.4*    286 236   MCV 92.3 90.8 91.0     BMP:   Lab Results   Component Value Date     2024    K 3.5 2024     2024    CO2 24 2024    BUN 24.8 (H) 2024    CREATININE 0.94 2024    CALCIUM 9.2 2024    MG 2.30 2024     LFTs:  "  Lab Results   Component Value Date    ALBUMIN 3.0 (L) 07/18/2024    BILITOT 1.4 07/18/2024    AST 24 07/18/2024    ALKPHOS 68 07/18/2024    ALT 13 07/18/2024     Coags: No results found for: "INR", "PROTIME", "PTT"  FLP:   Lab Results   Component Value Date    CHOL 173 07/15/2024    HDL 45 07/15/2024    TRIG 100 07/15/2024     DM:   Lab Results   Component Value Date    CREATININE 0.94 07/18/2024     Thyroid:   Lab Results   Component Value Date    TSH 0.936 07/17/2024      Anemia: No results found for: "IRON", "TIBC", "FERRITIN", "SATURATEDIRO"  No results found for: "GNAGJFUG09"  No results found for: "FOLATE"     Cardiac:   Lab Results   Component Value Date    TROPONINI 0.582 (H) 07/17/2024    .5 (H) 07/17/2024         Microbiology Data:  Microbiology Results (last 7 days)       Procedure Component Value Units Date/Time    BCID2 Panel [[id number]]  (Abnormal) Collected: 07/17/24 1232    Order Status: Completed Specimen: Blood from Arm, Right Updated: 07/18/24 1214     CTX-M (ESBL ) Not Detected     IMP (Cabapenemase ) Not Detected     KPC resistance gene (Carbapenemase ) Not Detected     mcr-1 N/A     mecA ID N/A     Comment: Note: Antimicrobial resistance can occur via multiple mechanisms. A Not Detected result for antimicrobial resistance gene(s) does not indicate antimicrobial susceptibility. Subculturing is required for species identification and susceptibility testing of   isolates.        mecA/C and MREJ (MRSA) gene N/A     NDM (Carbapenemase ) Not Detected     OXA-48-like (Carbapenemase ) Not Detected     Anthony/B (VRE gene) N/A     VIM (Carbapenemase ) Not Detected     Enterococcus faecalis Not Detected     Enterococcus faecium Not Detected     Listeria monocytogenes Not Detected     Staphylococcus spp. Not Detected     Staphylococcus aureus Not Detected     Staphylococcus epidermidis Not Detected     Staphylococcus lugdunensis Not Detected     " Streptococcus spp. Not Detected     Streptococcus agalactiae (Group B) Not Detected     Streptococcus pneumoniae Not Detected     Streptococcus pyogenes (Group A) Not Detected     Acinetobacter calcoaceticus/baumannii complex Not Detected     Bacteroides fragilis Not Detected     Enterobacterales Detected     Enterobacter cloacae complex Not Detected     Escherichia coli Not Detected     Klebsiella aerogenes Not Detected     Klebsiella oxytoca Not Detected     Klebsiella pneumoniae group Not Detected     Proteus spp. Not Detected     Salmonella spp. Not Detected     Serratia marcescens Not Detected     Haemophilus influenzae Not Detected     Neisseria meningitidis Not Detected     Pseudomonas aeruginosa Detected     Stenotrophomonas maltophilia Not Detected     Candida albicans Not Detected     Candida auris Not Detected     Candida glabrata Not Detected     Candida krusei Not Detected     Candida parapsilosis Not Detected     Candida tropicalis Not Detected     Cryptococcus neoformans/gattii Not Detected    Narrative:      The [employer] BCID2 Panel is a multiplexed nucleic acid test intended for the use with [employer]® Eve.0 or [employer] for the simultaneous qualitative detection and identification of multiple bacterial and yeast nucleic acids and select genetic determinants associated with antimicrobial resistance.  The [employer] BCID2 Panel test is performed directly on blood culture samples identified as positive by a continuous monitoring blood culture system.  Results are intended to be interpreted in conjunction with Gram stain results.    Blood culture #2 **CANNOT BE ORDERED STAT** [[id number]]  (Abnormal) Collected: 07/17/24 1232    Order Status: Completed Specimen: Blood from Hand, Right Updated: 07/18/24 1105     GRAM STAIN Gram Negative Rods      Seen in gram stain of broth only      1 of 1 Aerobic bottle positive    Blood culture #1 **CANNOT BE ORDERED STAT** [[id number]]   (Abnormal) Collected: 07/17/24 1232    Order Status: Completed Specimen: Blood from Arm, Right Updated: 07/18/24 1009     GRAM STAIN Gram Negative Rods      Seen in gram stain of broth only      1 of 2 Aerobic bottles positive             Other Results:    Radiology:  Imaging Results              X-Ray Chest 1 View (Final result)  Result time 07/18/24 11:15:43      Final result by Miles Branham MD (07/18/24 11:15:43)                   Impression:      Cardiomegaly and mild congestive changes.      Electronically signed by: Miles Branham  Date:    07/18/2024  Time:    11:15               Narrative:    EXAMINATION:  XR CHEST 1 VIEW    CLINICAL HISTORY:  cough;    TECHNIQUE:  One view    COMPARISON:  July 17, 2024.    FINDINGS:  Cardiopericardial silhouette enlarged appearance similar.  Lungs interstitial markings prominence suggest mild congestive process.  There is no focally dense consolidation or significant fluid within the pleural spaces.  No pneumothorax.                                       CT Abdomen Pelvis W Wo Contrast (Final result)  Result time 07/17/24 16:26:13      Final result by Jenniffer Cottrell MD (07/17/24 16:26:13)                   Impression:      No acute process      Electronically signed by: Emiliano Cottrell  Date:    07/17/2024  Time:    16:26               Narrative:    EXAMINATION:  CT ABDOMEN PELVIS W WO CONTRAST    CLINICAL HISTORY:  Abdominal abscess/infection suspected;    TECHNIQUE:  Low dose axial images, sagittal and coronal reformations were obtained from the lung bases to the pubic symphysis following the IV administration of  contrast.  Pre contrasted imaging was performed as well. Automatic exposure control is utilized to reduce patient radiation exposure.    COMPARISON:  03/20/2023    FINDINGS:  There is mild bibasilar atelectasis..    The liver appears normal.  No liver mass or lesion is seen.  Portal and hepatic veins appear normal.    The gallbladder appears grossly  unremarkable.    The pancreas appears normal.  No pancreatic mass or lesion is seen.    The spleen appears normal.  No splenic mass or lesion is seen.    The adrenal glands appear normal.  No adrenal nodule is seen.    The kidneys are normal in size.  No hydronephrosis is seen.  No hydroureter is seen.  No nephrolithiasis or ureteral stone is seen.  There is some punctate cysts seen in the kidneys bilaterally.  No focal mass is seen.    Urinary bladder appears normal.    No colitis is seen.  There are multiple diverticuli in the sigmoid colon but no diverticulitis is seen.  No colonic mass or lesion or inflammatory process is seen.  The appendix appears normal.    Atherosclerotic plaque is seen in the abdominal aorta and mesenteric vessels.    No free air is seen.  No free fluid is seen.    The bones appear grossly unremarkable.                                       X-Ray Chest AP Portable (Final result)  Result time 07/17/24 12:12:57      Final result by Beni Medeiros MD (07/17/24 12:12:57)                   Impression:      No acute chest disease is identified..    No significant abnormalities identified      Electronically signed by: Beni Medeiros  Date:    07/17/2024  Time:    12:12               Narrative:    EXAMINATION:  XR CHEST AP PORTABLE    CLINICAL HISTORY:  cough;, .    FINDINGS:  No alveolar consolidation, effusion, or pneumothorax is seen.   The thoracic aorta is normal  cardiac silhouette is at the upper limits of normal, central pulmonary vessels and mediastinum are normal in size and are grossly unremarkable.   visualized osseous structures are grossly unremarkable.                                      Current Medications:     Infusions:       Scheduled:   amLODIPine  10 mg Oral Daily    atorvastatin  20 mg Oral Daily    heparin (porcine)  5,000 Units Subcutaneous Q8H    triamterene-hydrochlorothiazide 37.5-25 mg  1 tablet Oral Daily    vancomycin (VANCOCIN) IV (PEDS and ADULTS)  1,000 mg  Intravenous Q24H        PRN:    Current Facility-Administered Medications:     acetaminophen, 650 mg, Oral, Q6H PRN    dextrose 10%, 12.5 g, Intravenous, PRN    dextrose 10%, 25 g, Intravenous, PRN    glucagon (human recombinant), 1 mg, Intramuscular, PRN    glucose, 16 g, Oral, PRN    glucose, 24 g, Oral, PRN    naloxone, 0.02 mg, Intravenous, PRN    ondansetron, 4 mg, Intravenous, Q8H PRN    sodium chloride 0.9%, 10 mL, Intravenous, Q12H PRN    Assessment & Plan:     Leukocytosis  Bacteremia       - WBC 18.60 on admission, improved to 13.03       - CXR no signs of infectious process, UA unremarkable, no visible skin wounds       - Repeat CXR cardiomegaly and mild congestive changes       - Patient has hx of chronic kidney cysts with last imaging 3/2023. CT abdomen pelvis w wo contrast no acute process       - Blood cx 2/2 positive, BICD2 panel positive for enterobacterales and pesudomonas, unclear source at this time       - Continue vanc, zosyn for empiric coverage     DEVIN - resolved        - Cr 1.26, baseline <1        - Cr 0.94        - Patient reports decreased fluid intake        - Given patient's peripheral edema will continue with gentle fluid resuscitation     Peripheral edema       - Chronic, patient reports has at baseline at home       - Repeat echo 7/17 EF 69%, mild aortic regurgitation, PHTN with PA 37 mm Hg       - No symptoms of SOB, no crackles on PE, continue to monitor and will diurese as needed     HTN       - On triamterene-HCTZ 37.5-25 mg daily, benzapril 40 mg daily, and amlodipine 10 mg daily at home       - Will hold ACE given patient's DEVIN at this time       - Resume other home meds        CODE STATUS: Full  Access: PIV  Antibiotics: Vanc, zosyn  Diet: Cardiac  DVT Prophylaxis: Heparin  GI Prophylaxis: None  Fluids: None        Disposition: Bacteremia of unknown origin. Continue empiric abx coverage.       Rm Portillo MD  [facility] -II

## 2024-07-19 LAB
ALBUMIN SERPL-MCNC: 3 G/DL (ref 3.4–4.8)
ALBUMIN/GLOB SERPL: 0.8 RATIO (ref 1.1–2)
ALP SERPL-CCNC: 106 UNIT/L (ref 40–150)
ALT SERPL-CCNC: 30 UNIT/L (ref 0–55)
ANION GAP SERPL CALC-SCNC: 7 MEQ/L
AST SERPL-CCNC: 54 UNIT/L (ref 5–34)
BASOPHILS # BLD AUTO: 0.05 X10(3)/MCL
BASOPHILS NFR BLD AUTO: 0.5 %
BILIRUB SERPL-MCNC: 1 MG/DL
BUN SERPL-MCNC: 17.1 MG/DL (ref 9.8–20.1)
CALCIUM SERPL-MCNC: 9 MG/DL (ref 8.4–10.2)
CHLORIDE SERPL-SCNC: 105 MMOL/L (ref 98–107)
CO2 SERPL-SCNC: 26 MMOL/L (ref 23–31)
CREAT SERPL-MCNC: 0.93 MG/DL (ref 0.55–1.02)
CREAT/UREA NIT SERPL: 18
EOSINOPHIL # BLD AUTO: 0.73 X10(3)/MCL (ref 0–0.9)
EOSINOPHIL NFR BLD AUTO: 7.5 %
ERYTHROCYTE [DISTWIDTH] IN BLOOD BY AUTOMATED COUNT: 14.7 % (ref 11.5–17)
GFR SERPLBLD CREATININE-BSD FMLA CKD-EPI: 60 ML/MIN/1.73/M2
GLOBULIN SER-MCNC: 3.6 GM/DL (ref 2.4–3.5)
GLUCOSE SERPL-MCNC: 109 MG/DL (ref 82–115)
HAV IGM SERPL QL IA: NONREACTIVE
HBV CORE IGM SERPL QL IA: NONREACTIVE
HBV SURFACE AB SER-ACNC: 0 MIU/ML
HBV SURFACE AB SERPL IA-ACNC: NONREACTIVE M[IU]/ML
HBV SURFACE AG SERPL QL IA: NONREACTIVE
HCT VFR BLD AUTO: 30.4 % (ref 37–47)
HCV AB SERPL QL IA: NONREACTIVE
HGB BLD-MCNC: 10.2 G/DL (ref 12–16)
HIV 1+2 AB+HIV1 P24 AG SERPL QL IA: NONREACTIVE
HOLD SPECIMEN: NORMAL
IMM GRANULOCYTES # BLD AUTO: 0.04 X10(3)/MCL (ref 0–0.04)
IMM GRANULOCYTES NFR BLD AUTO: 0.4 %
LYMPHOCYTES # BLD AUTO: 1.75 X10(3)/MCL (ref 0.6–4.6)
LYMPHOCYTES NFR BLD AUTO: 17.9 %
MCH RBC QN AUTO: 29.8 PG (ref 27–31)
MCHC RBC AUTO-ENTMCNC: 33.6 G/DL (ref 33–36)
MCV RBC AUTO: 88.9 FL (ref 80–94)
MONOCYTES # BLD AUTO: 0.96 X10(3)/MCL (ref 0.1–1.3)
MONOCYTES NFR BLD AUTO: 9.8 %
NEUTROPHILS # BLD AUTO: 6.22 X10(3)/MCL (ref 2.1–9.2)
NEUTROPHILS NFR BLD AUTO: 63.9 %
NRBC BLD AUTO-RTO: 0 %
PLATELET # BLD AUTO: 253 X10(3)/MCL (ref 130–400)
PMV BLD AUTO: 10.3 FL (ref 7.4–10.4)
POTASSIUM SERPL-SCNC: 3.3 MMOL/L (ref 3.5–5.1)
PROT SERPL-MCNC: 6.6 GM/DL (ref 5.8–7.6)
RBC # BLD AUTO: 3.42 X10(6)/MCL (ref 4.2–5.4)
SODIUM SERPL-SCNC: 138 MMOL/L (ref 136–145)
T PALLIDUM AB SER QL: NONREACTIVE
VANCOMYCIN SERPL-MCNC: 9.4 UG/ML (ref 15–20)
WBC # BLD AUTO: 9.75 X10(3)/MCL (ref 4.5–11.5)

## 2024-07-19 PROCEDURE — 63600175 PHARM REV CODE 636 W HCPCS: Performed by: STUDENT IN AN ORGANIZED HEALTH CARE EDUCATION/TRAINING PROGRAM

## 2024-07-19 PROCEDURE — 87389 HIV-1 AG W/HIV-1&-2 AB AG IA: CPT | Performed by: STUDENT IN AN ORGANIZED HEALTH CARE EDUCATION/TRAINING PROGRAM

## 2024-07-19 PROCEDURE — 80053 COMPREHEN METABOLIC PANEL: CPT

## 2024-07-19 PROCEDURE — 96372 THER/PROPH/DIAG INJ SC/IM: CPT

## 2024-07-19 PROCEDURE — 85025 COMPLETE CBC W/AUTO DIFF WBC: CPT

## 2024-07-19 PROCEDURE — 80074 ACUTE HEPATITIS PANEL: CPT | Performed by: STUDENT IN AN ORGANIZED HEALTH CARE EDUCATION/TRAINING PROGRAM

## 2024-07-19 PROCEDURE — 36415 COLL VENOUS BLD VENIPUNCTURE: CPT | Mod: 91 | Performed by: STUDENT IN AN ORGANIZED HEALTH CARE EDUCATION/TRAINING PROGRAM

## 2024-07-19 PROCEDURE — 21400001 HC TELEMETRY ROOM

## 2024-07-19 PROCEDURE — 25000003 PHARM REV CODE 250

## 2024-07-19 PROCEDURE — 97165 OT EVAL LOW COMPLEX 30 MIN: CPT

## 2024-07-19 PROCEDURE — 86780 TREPONEMA PALLIDUM: CPT | Performed by: STUDENT IN AN ORGANIZED HEALTH CARE EDUCATION/TRAINING PROGRAM

## 2024-07-19 PROCEDURE — 36415 COLL VENOUS BLD VENIPUNCTURE: CPT

## 2024-07-19 PROCEDURE — 86706 HEP B SURFACE ANTIBODY: CPT | Performed by: NURSE PRACTITIONER

## 2024-07-19 PROCEDURE — 97162 PT EVAL MOD COMPLEX 30 MIN: CPT

## 2024-07-19 PROCEDURE — 11000001 HC ACUTE MED/SURG PRIVATE ROOM

## 2024-07-19 PROCEDURE — 25000003 PHARM REV CODE 250: Performed by: INTERNAL MEDICINE

## 2024-07-19 PROCEDURE — 63600175 PHARM REV CODE 636 W HCPCS

## 2024-07-19 PROCEDURE — 80202 ASSAY OF VANCOMYCIN: CPT

## 2024-07-19 PROCEDURE — 86682 HELMINTH ANTIBODY: CPT | Performed by: STUDENT IN AN ORGANIZED HEALTH CARE EDUCATION/TRAINING PROGRAM

## 2024-07-19 PROCEDURE — 25000003 PHARM REV CODE 250: Performed by: STUDENT IN AN ORGANIZED HEALTH CARE EDUCATION/TRAINING PROGRAM

## 2024-07-19 RX ORDER — LISINOPRIL 10 MG/1
40 TABLET ORAL DAILY
Status: DISCONTINUED | OUTPATIENT
Start: 2024-07-19 | End: 2024-07-23 | Stop reason: HOSPADM

## 2024-07-19 RX ORDER — POTASSIUM CHLORIDE 20 MEQ/1
40 TABLET, EXTENDED RELEASE ORAL ONCE
Status: COMPLETED | OUTPATIENT
Start: 2024-07-19 | End: 2024-07-19

## 2024-07-19 RX ADMIN — TRIAMTERENE AND HYDROCHLOROTHIAZIDE 1 TABLET: 37.5; 25 CAPSULE ORAL at 08:07

## 2024-07-19 RX ADMIN — VANCOMYCIN HYDROCHLORIDE 1000 MG: 1 INJECTION, POWDER, LYOPHILIZED, FOR SOLUTION INTRAVENOUS at 03:07

## 2024-07-19 RX ADMIN — HEPARIN SODIUM 5000 UNITS: 5000 INJECTION INTRAVENOUS; SUBCUTANEOUS at 01:07

## 2024-07-19 RX ADMIN — HEPARIN SODIUM 5000 UNITS: 5000 INJECTION INTRAVENOUS; SUBCUTANEOUS at 05:07

## 2024-07-19 RX ADMIN — HEPARIN SODIUM 5000 UNITS: 5000 INJECTION INTRAVENOUS; SUBCUTANEOUS at 09:07

## 2024-07-19 RX ADMIN — AMLODIPINE BESYLATE 10 MG: 10 TABLET ORAL at 08:07

## 2024-07-19 RX ADMIN — LISINOPRIL 40 MG: 10 TABLET ORAL at 12:07

## 2024-07-19 RX ADMIN — ATORVASTATIN CALCIUM 20 MG: 20 TABLET, FILM COATED ORAL at 08:07

## 2024-07-19 RX ADMIN — POTASSIUM CHLORIDE 40 MEQ: 1500 TABLET, EXTENDED RELEASE ORAL at 08:07

## 2024-07-19 RX ADMIN — SODIUM CHLORIDE, POTASSIUM CHLORIDE, SODIUM LACTATE AND CALCIUM CHLORIDE: 600; 310; 30; 20 INJECTION, SOLUTION INTRAVENOUS at 03:07

## 2024-07-19 RX ADMIN — CEFEPIME 2 G: 2 INJECTION, POWDER, FOR SOLUTION INTRAVENOUS at 04:07

## 2024-07-19 NOTE — CONSULTS
Patient is seen at bedside after a consult for a skin check. Pt with positive blood cultures without identified source. Pt and family deny any wounds. Pt has a callus to her left plantar foot that does not appear concerning for underlying ulceration. A small pink area to left shin also not concerning for infection. Sacrum is intact. No visible skin issues noted. No need for wound care to follow. Family with multiple questions about blood cultures results, I encouraged them to discuss with ID or IM team.

## 2024-07-19 NOTE — PROGRESS NOTES
Pharmacokinetic Assessment Follow Up: IV Vancomycin    Vancomycin serum concentration assessment(s):    The trough level was drawn correctly and can be used to guide therapy at this time. The measurement is below the desired definitive target range of 15 to 20 mcg/mL.    Vancomycin Regimen Plan:    Continue regimen to Vancomycin 1000 mg IV every 24 hours with next serum trough concentration measured at 1400 prior to 3rd dose on 7/21/2024.    Drug levels (last 3 results):  Recent Labs   Lab Result Units 07/19/24  1359   Vancomycin Random ug/ml 9.4*       Pharmacy will continue to follow and monitor vancomycin.    Please contact pharmacy at extension [phone] for questions regarding this assessment.    Thank you for the consult,   Bambi Griffith       Patient brief summary:  Leda Gallardo is a 89 y.o. female initiated on antimicrobial therapy with IV Vancomycin for treatment of bacteremia    The patient's current regimen is Vancomycin 1 gram every 24 hours.     Drug Allergies:   Review of patient's allergies indicates:  No Known Allergies      Renal Function:   Estimated Creatinine Clearance: 46.2 mL/min (based on SCr of 0.93 mg/dL).,     Dialysis Method (if applicable):  N/A    CBC (last 72 hours):  Recent Labs   Lab Result Units 07/17/24  1102 07/18/24  0336 07/19/24  0515   WBC x10(3)/mcL 18.60* 13.03* 9.75   Hgb g/dL 10.0* 9.4* 10.2*   Hct % 31.6* 29.4* 30.4*   Platelet x10(3)/mcL 286 236 253   Mono % %  --  4.8 9.8   Monocytes % % 2  --   --    Eos % %  --  1.4 7.5   Basophil % %  --  0.4 0.5       Metabolic Panel (last 72 hours):  Recent Labs   Lab Result Units 07/17/24  1102 07/17/24  1230 07/18/24  0336 07/19/24  0515   Sodium mmol/L 137  --  138 138   Potassium mmol/L 3.6  --  3.5 3.3*   Chloride mmol/L 103  --  106 105   CO2 mmol/L 26  --  24 26   Glucose mg/dL 127*  --  89 109   Glucose, UA   --  Normal  --   --    Blood Urea Nitrogen mg/dL 33.3*  --  24.8* 17.1   Creatinine mg/dL 1.26*  --  0.94 0.93   Albumin  g/dL 3.5  --  3.0* 3.0*   Bilirubin Total mg/dL 1.2  --  1.4 1.0   ALP unit/L 69  --  68 106   AST unit/L 27  --  24 54*   ALT unit/L 14  --  13 30   Magnesium Level mg/dL 2.30  --   --   --        Vancomycin Administrations:  vancomycin given in the last 96 hours                     vancomycin (VANCOCIN) 1,000 mg in D5W 250 mL IVPB (mg) 1,000 mg New Bag 07/18/24 1533    vancomycin (VANCOCIN) 1,750 mg in D5W 500 mL IVPB (mg) 1,750 mg New Bag 07/17/24 1314                    Microbiologic Results:  Microbiology Results (last 7 days)       Procedure Component Value Units Date/Time    Blood culture #2 **CANNOT BE ORDERED STAT** [[id number]]  (Abnormal) Collected: 07/17/24 1232    Order Status: Completed Specimen: Blood from Hand, Right Updated: 07/19/24 1130     Blood Culture Pseudomonas aeruginosa     GRAM STAIN Gram Negative Rods      Seen in gram stain of broth only      1 of 1 Aerobic bottle positive    Blood culture #1 **CANNOT BE ORDERED STAT** [[id number]]  (Abnormal) Collected: 07/17/24 1232    Order Status: Completed Specimen: Blood from Arm, Right Updated: 07/19/24 1130     Blood Culture Providencia stuartii     GRAM STAIN Gram Negative Rods      Seen in gram stain of broth only      1 of 2 Aerobic bottles positive    Chlamydia/GC, PCR [[id number]]     Order Status: Sent Specimen: Urine     BCID2 Panel [[id number]]  (Abnormal) Collected: 07/17/24 1232    Order Status: Completed Specimen: Blood from Arm, Right Updated: 07/18/24 1214     CTX-M (ESBL ) Not Detected     IMP (Cabapenemase ) Not Detected     KPC resistance gene (Carbapenemase ) Not Detected     mcr-1 N/A     mecA ID N/A     Comment: Note: Antimicrobial resistance can occur via multiple mechanisms. A Not Detected result for antimicrobial resistance gene(s) does not indicate antimicrobial susceptibility. Subculturing is required for species identification and susceptibility testing of   isolates.        mecA/C and MREJ (MRSA)  gene N/A     NDM (Carbapenemase ) Not Detected     OXA-48-like (Carbapenemase ) Not Detected     Anthony/B (VRE gene) N/A     VIM (Carbapenemase ) Not Detected     Enterococcus faecalis Not Detected     Enterococcus faecium Not Detected     Listeria monocytogenes Not Detected     Staphylococcus spp. Not Detected     Staphylococcus aureus Not Detected     Staphylococcus epidermidis Not Detected     Staphylococcus lugdunensis Not Detected     Streptococcus spp. Not Detected     Streptococcus agalactiae (Group B) Not Detected     Streptococcus pneumoniae Not Detected     Streptococcus pyogenes (Group A) Not Detected     Acinetobacter calcoaceticus/baumannii complex Not Detected     Bacteroides fragilis Not Detected     Enterobacterales Detected     Enterobacter cloacae complex Not Detected     Escherichia coli Not Detected     Klebsiella aerogenes Not Detected     Klebsiella oxytoca Not Detected     Klebsiella pneumoniae group Not Detected     Proteus spp. Not Detected     Salmonella spp. Not Detected     Serratia marcescens Not Detected     Haemophilus influenzae Not Detected     Neisseria meningitidis Not Detected     Pseudomonas aeruginosa Detected     Stenotrophomonas maltophilia Not Detected     Candida albicans Not Detected     Candida auris Not Detected     Candida glabrata Not Detected     Candida krusei Not Detected     Candida parapsilosis Not Detected     Candida tropicalis Not Detected     Cryptococcus neoformans/gattii Not Detected    Narrative:      The [employer] BCID2 Panel is a multiplexed nucleic acid test intended for the use with [employer]® 2.0 or [employer]® [employer] Systems for the simultaneous qualitative detection and identification of multiple bacterial and yeast nucleic acids and select genetic determinants associated with antimicrobial resistance.  The [employer] BCID2 Panel test is performed directly on blood culture samples identified as positive by a continuous  monitoring blood culture system.  Results are intended to be interpreted in conjunction with Gram stain results.

## 2024-07-19 NOTE — PLAN OF CARE
07/19/24 1010   Discharge Assessment   Assessment Type Discharge Planning Assessment   Confirmed/corrected address, phone number and insurance Yes   Source of Information patient   When was your last doctors appointment?   (Drea Ribeiro)   Reason For Admission Lower extremity edema, fatigue, weakness, CP, sepsis   People in Home child(charlie), adult;grandchild(charlie)   Facility Arrived From: home   Do you expect to return to your current living situation? Yes   Do you have help at home or someone to help you manage your care at home? Yes   Who are your caregiver(s) and their phone number(s)? Daughter Marcelina Gallardo [phone], Candice Gallardo [phone]   Prior to hospitilization cognitive status: Alert/Oriented   Current cognitive status: Alert/Oriented   Walking or Climbing Stairs Difficulty no   Dressing/Bathing Difficulty no   Home Layout Able to live on 1st floor   Equipment Currently Used at Home grab bar   Readmission within 30 days? No   Patient currently being followed by outpatient case management? No   Do you currently have service(s) that help you manage your care at home? No   Do you take prescription medications? Yes   Do you have prescription coverage? Yes   Coverage [facility] SECURE SNP -   Do you have any problems affording any of your prescribed medications? No   Is the patient taking medications as prescribed? yes   Who is going to help you get home at discharge? family   How do you get to doctors appointments? family or friend will provide   Are you on dialysis? No   Do you take coumadin? No   Discharge Plan A Home with family   Discharge Plan B Home Health   DME Needed Upon Discharge    (pending PT eval)   Discharge Plan discussed with: Patient;Adult children   Transition of Care Barriers None   Physical Activity   On average, how many days per week do you engage in moderate to strenuous exercise (like a brisk walk)? 0 days   On average, how many minutes do  you engage in exercise at this level? 0 min   Financial Resource Strain   How hard is it for you to pay for the very basics like food, housing, medical care, and heating? Not hard   Housing Stability   In the last 12 months, was there a time when you were not able to pay the mortgage or rent on time? N   At any time in the past 12 months, were you homeless or living in a shelter (including now)? N   Transportation Needs   Has the lack of transportation kept you from medical appointments, meetings, work or from getting things needed for daily living? No   Food Insecurity   Within the past 12 months, you worried that your food would run out before you got the money to buy more. Never true   Within the past 12 months, the food you bought just didn't last and you didn't have money to get more. Never true   Stress   Do you feel stress - tense, restless, nervous, or anxious, or unable to sleep at night because your mind is troubled all the time - these days? Not at all   Social Isolation   How often do you feel lonely or isolated from those around you?  Never   Alcohol Use   Q1: How often do you have a drink containing alcohol? Never   Q2: How many drinks containing alcohol do you have on a typical day when you are drinking? None   Q3: How often do you have six or more drinks on one occasion? Never   [place]   In the past 12 months has the electric, gas, oil, or water company threatened to shut off services in your home? No   Health Literacy   How often do you need to have someone help you when you read instructions, pamphlets, or other written material from your doctor or pharmacy? Never   OTHER   Name(s) of People in Home Daughter Marcelina Gallardo [phone]     Pt's daughter Marcelina guo lives with her.

## 2024-07-19 NOTE — PT/OT/SLP EVAL
Occupational Therapy   Evaluation    Name: Leda Gallardo  MRN: [id number]  Admitting Diagnosis:   Patient Active Problem List   Diagnosis    Hyperlipidemia    Hypertension    Obesity    Renal mass    Vitamin D deficiency     Recent Surgery: * No surgery found *      Recommendations:     Discharge Recommendations: Low Intensity Therapy  Discharge Equipment Recommendations:  grab bar, shower chair  Barriers to discharge:       Assessment:     Leda Gallardo is a 89 y.o. female with a medical diagnosis of   Patient Active Problem List   Diagnosis    Hyperlipidemia    Hypertension    Obesity    Renal mass    Vitamin D deficiency   She presents with functional decline and decreased occupational independence. Performance deficits affecting function: weakness, impaired endurance, impaired sensation, impaired self care skills, impaired functional mobility, gait instability, impaired balance, decreased safety awareness, impaired cardiopulmonary response to activity.      Rehab Prognosis: Good; patient would benefit from acute skilled OT services to address these deficits and reach maximum level of function.       Plan:     Patient to be seen   to address the above listed problems via    Plan of Care Expires:  (d/c)  Plan of Care Reviewed with: patient, daughter    Subjective     Chief Complaint: no complaints at this time.  Patient/Family Comments/goals: to maximize independence and return home to [facility].    Occupational Profile:  Living Environment: [facility] with one step into house with no rails, tub, lives with daughter and niece with multiple kids.   Previous level of function: I with ADLs and IADLs, patient only required assist with tub transfer.   Roles and Routines: patient performed all adls and iadls such as cooking and cleaning around the house for chores.   Equipment Used at Home: grab bar  Assistance upon Discharge: family     Pain/Comfort:  Pain Rating 1: 0/10  Pain Addressed 1: Nurse notified  Pain Rating  Post-Intervention 1: 0/10    Patients cultural, spiritual, Voodoo conflicts given the current situation: no    Objective:     Communicated with: Nurse Rosales prior to session.  Patient found supine with telemetry, PureWick, peripheral IV upon OT entry to room.    General Precautions: Standard, fall  Orthopedic Precautions: N/A  Braces: N/A  Respiratory Status: Room air    Occupational Performance:    Bed Mobility:    Patient completed Rolling/Turning to Left with  supervision  Patient completed Scooting/Bridging with supervision  Patient completed Supine to Sit with supervision    Functional Mobility/Transfers:  Patient completed Sit <> Stand Transfer with contact guard assistance  with  hand-held assist   Patient completed Toilet Transfer Step Transfer technique with minimum assistance with  rolling walker and grab bars  Functional Mobility: pt. Ambulated ~10 ft in room with HH assist and no significant LOB but unsteadiness in gait present. See PT evaluation for full information regarding functional mobility and ambulation.    Activities of Daily Living:  Feeding:  independence    Upper Body Dressing: stand by assistance seated EOB  Lower Body Dressing: minimum assistance seated EOB    Cognitive/Visual Perceptual:  Cognitive/Psychosocial Skills:     -       Oriented to: Person, Place, Time, and Situation   -       Follows Commands/attention:Follows multistep  commands  -       Safety awareness/insight to disability: intact     Physical Exam:  Balance:    -       static & dynamic sitting: independent, static standing: min A, dynamic standing: Mod A  Dominant hand:    -       Right  Upper Extremity Range of Motion:     -       Right Upper Extremity: WFL  -       Left Upper Extremity: WFL  Upper Extremity Strength:    -       Right Upper Extremity: WFL  -       Left Upper Extremity: WFL   Strength:    -       Right Upper Extremity: WFL  -       Left Upper Extremity: WFL    Treatment & Education:    Pt.  educated on OT goals, POC, orientation to environment, use of call bell for assist with transfers OOB or for any other needs due to fall risk.      Patient left up in chair with all lines intact, call button in reach, nurse notified, and daughter present    GOALS:   Multidisciplinary Problems       Occupational Therapy Goals          Problem: Occupational Therapy    Goal Priority Disciplines Outcome Interventions   Occupational Therapy Goal     OT, PT/OT Progressing    Description: Perform lower body dressing with Mod I.  Perform toilet transfer with Mod I using RW and grab bars.  Perform grooming at sink in standing with mod I and use of RW.  Perform toilet routine/hygiene with mod I using RW.                       History:     Past Medical History:   Diagnosis Date    Essential (primary) hypertension     Heart disease          Past Surgical History:   Procedure Laterality Date    HYSTERECTOMY      LEFT HEART CATHETERIZATION  06/11/2021       Time Tracking:     OT Date of Treatment:  07/19/2024  OT Start Time: 0902  OT Stop Time: 0917  OT Total Time (min): 15 min    Billable Minutes:Evaluation 15    7/19/2024

## 2024-07-19 NOTE — CONSULTS
[facility]   Inpatient Infectious Diseases Consultation    Physician requesting consultation: Malathi Thapa MD  Service requesting consultation: Internal Medicine  Reason for consultation:  Pseudomonas & Providencia Bacteremia    Historian: Patient and Electronic Medical Record    Isolation Status: No active isolations     HPI:   Ms. Leda Gallardo is an 88 y/o female with PMHx of HTN, HLD, vitamin-D deficiency, left renal cyst (s/p no intervention) and gout who presented to [facility] ED on (07/17/2024) with primary complaint of x1 day chills, shortness, weakness, and nausea.  She reports being in her usual state of health prior to having the symptoms occurred suddenly.  Denied any associated lightheadedness/dizziness, headaches, acute changes in vision, sore throat, sinus congestion, chest pain, palpitations, vomiting, or diarrhea.  On admission, patient sirs (2/4, leukocytosis of 18 & febrile to 101.2F) with vital signs within normal limits.  She was initiated on broad-spectrum antibiotics with vancomycin/Zosyn.  Vancomycin has been continued since admission however unfortunately Zosyn was only given a 1 time dose in ED. imaging performed on admission included CXR and CT abdomen/pelvis with/without contrast both unremarkable for acute processes other than highlighting previously known left renal cyst without concern for concurrent abscess.    Since admission, leukocytosis has resolved (18--> 13--> 9) in addition to patient no longer febrile since 7/18/24.  Despite improvement, blood cultures, 2/2 bottles, have resulted positive for Pseudomonas and Providencia.  Infectious Disease has been consulted for further evaluation of above-mentioned bacteremia for further antibiotic recommendations.    Today, patient reports feeling well overall without any acute complaints.  She denies any current fever/chills, nausea/vomiting, chest pain, shortness for breath, abdominal pain, flank pain,  dysuria/hematuria/pyuria/malodorous urine.  Her only related symptom is of dry cough in which she states has been there for many months, never productive.  She denies any recent intra-abdominal or other genitourinary related surgeries other than previous hysterectomy.  She also denies any recent dental work or any prior history of urinary tract infections.  She does report having a significant callus to her left foot that was incisional removed approximate x6 months ago though did not receive any antibiotic therapy.  Family reported patient recently prepared and ate garfish though denies cutting herself with knife/utensils or poking herself with any of the fish bones.  Family also stated that at home her primary water source is a dispenser that is filled by local wat[employer]ill jug but is unsure of sanitation.  Does have history of left renal cyst though unfortunately has not follow up in [place] for further evaluation.  Of note; per chart review, patient did have Pseudomonas in 1/2 bottles blood culture in 2022.      Antibiotic / Antiviral / Antifungal history:  Vancomycin (7/17 - present)  Zosyn (7/17; x1 dose)      Past Medical History/Past Surgical History/Social History     Past Medical History:   Diagnosis Date    Essential (primary) hypertension     Heart disease        Past Surgical History:   Procedure Laterality Date    HYSTERECTOMY      LEFT HEART CATHETERIZATION  06/11/2021        FAMILY HISTORY:  family history includes Breast cancer in her mother and sister.     SOCIAL HISTORY:   Social History     Socioeconomic History    Marital status:     Number of children: 10   Occupational History    Occupation: Retired   Tobacco Use    Smoking status: Never    Smokeless tobacco: Never   Substance and Sexual Activity    Alcohol use: Never    Drug use: Never   Social History Narrative    ** Merged History Encounter **          Social Determinants of Health     Financial Resource Strain: Low Risk   (7/19/2024)    Overall Financial Resource Strain (CARDIA)     Difficulty of Paying Living Expenses: Not hard at all   Food Insecurity: No Food Insecurity (7/19/2024)    Hunger Vital Sign     Worried About Running Out of Food in the Last Year: Never true     Ran Out of Food in the Last Year: Never true   Transportation Needs: No Transportation Needs (7/19/2024)    TRANSPORTATION NEEDS     Transportation : No   Physical Activity: Inactive (7/19/2024)    Exercise Vital Sign     Days of Exercise per Week: 0 days     Minutes of Exercise per Session: 0 min   Stress: No Stress Concern Present (7/19/2024)    [employer] - Occupational Stress Questionnaire     Feeling of Stress : Not at all   Housing Stability: Low Risk  (7/19/2024)    Housing Stability Vital Sign     Unable to Pay for Housing in the Last Year: No     Homeless in the Last Year: No        ALLERGIES: Review of patient's allergies indicates:  No Known Allergies      Review of Systems     Review of Systems   Constitutional:  Negative for chills, diaphoresis, fever, malaise/fatigue and weight loss.   Respiratory:  Positive for cough (Dry cough). Negative for hemoptysis, sputum production, shortness of breath and wheezing.    Cardiovascular:  Negative for chest pain, palpitations and leg swelling.   Gastrointestinal:  Negative for abdominal pain, blood in stool, constipation, diarrhea, heartburn, melena, nausea and vomiting.   Genitourinary:  Negative for dysuria, frequency, hematuria and urgency.         MEDICATIONS:   Scheduled Meds:   amLODIPine  10 mg Oral Daily    atorvastatin  20 mg Oral Daily    heparin (porcine)  5,000 Units Subcutaneous Q8H    lisinopriL  40 mg Oral Daily    triamterene-hydrochlorothiazide 37.5-25 mg  1 tablet Oral Daily    vancomycin (VANCOCIN) IV (PEDS and ADULTS)  1,000 mg Intravenous Q24H     Continuous Infusions:   lactated ringers   Intravenous Continuous 50 mL/hr at 07/19/24 1534 New Bag at 07/19/24  1534     PRN Meds:.  Current Facility-Administered Medications:     acetaminophen, 650 mg, Oral, Q6H PRN    dextrose 10%, 12.5 g, Intravenous, PRN    dextrose 10%, 25 g, Intravenous, PRN    glucagon (human recombinant), 1 mg, Intramuscular, PRN    glucose, 16 g, Oral, PRN    glucose, 24 g, Oral, PRN    naloxone, 0.02 mg, Intravenous, PRN    ondansetron, 4 mg, Intravenous, Q8H PRN    sodium chloride 0.9%, 10 mL, Intravenous, Q12H PRN    Physical exam:     Temp:  [98.1 °F (36.7 °C)-98.8 °F (37.1 °C)] 98.1 °F (36.7 °C)  Pulse:  [63-72] 63  Resp:  [17-18] 17  SpO2:  [94 %-98 %] 98 %  BP: (130-163)/(65-83) 163/83     GENERAL:  Well-developed, pleasant female, AAO x3, in no acute distress sitting in bedside chair  HEENT: atraumatic, normocephalic, anicteric, moist oral mucosa without lesions, exudate, or erythema  LUNGS: breathing unlabored, coarse breath sounds appreciated amongst all posterior lung fields without associated wheezing  HEART: RRR; holosystolic murmur best appreciated at aortic auscultation window  ABDOMEN: abdomen soft, nondistended, nontender, with normoactive bowel sounds   : no suprapubic or CVA tenderness  EXTREMITIES: no edema, clubbing, or cyanosis   SKIN: no rashes or lesions, no appreciable foot skin ulceration/breakdown or between webbing of toes  NEURO: speech fluent and intact, facial symmetry preserved, no tremor  PSYCH: cooperative, normal mood and affect      LABS:     I have personally reviewed patient's labs.  Pertinent results noted below.    CBC  Recent Labs     07/17/24  1102 07/18/24  0336 07/19/24  0515   WBC 18.60* 13.03* 9.75   HGB 10.0* 9.4* 10.2*   HCT 31.6* 29.4* 30.4*    236 253       Differential  Recent Labs   Lab 07/19/24  0515   WBC 9.75   HGB 10.2*   HCT 30.4*           Basic Metabolic Panel  Recent Labs     07/17/24  1102 07/18/24  0336 07/19/24  0515    138 138   K 3.6 3.5 3.3*    106 105   CO2 26 24 26   BUN 33.3* 24.8* 17.1   CREATININE 1.26*  0.94 0.93        Hepatic Panel  Lab Results   Component Value Date    ALT 30 07/19/2024    AST 54 (H) 07/19/2024    ALKPHOS 106 07/19/2024    BILITOT 1.0 07/19/2024        Urinalysis:  Results for orders placed or performed during the hospital encounter of 07/17/24   Urinalysis, Reflex to Urine Culture    Specimen: Urine   Result Value Ref Range    Color, UA Yellow Yellow, Light-Yellow, Dark Yellow, Leah, Straw    Appearance, UA Clear Clear    Specific Gravity, UA 1.015 1.005 - 1.030    pH, UA 7.0 5.0 - 8.5    Protein, UA Trace (A) Negative    Glucose, UA Normal Negative, Normal    Ketones, UA Negative Negative    Blood, UA 1+ (A) Negative    Bilirubin, UA Negative Negative    Urobilinogen, UA Normal 0.2, 1.0, Normal    Nitrites, UA Negative Negative    Leukocyte Esterase, UA Negative Negative    RBC, UA 0-5 None Seen, 0-2, 3-5, 0-5 /HPF    WBC, UA 0-5 None Seen, 0-2, 3-5, 0-5 /HPF    Bacteria, UA None Seen None Seen /HPF    Squamous Epithelial Cells, UA Trace (A) None Seen /HPF    Mucous, UA Trace (A) None Seen /LPF    Hyaline Casts, UA None Seen None Seen /lpf       Estimated Creatinine Clearance: 46.2 mL/min (based on SCr of 0.93 mg/dL).       MICRO AND PATHOLOGY:   Blood cultures x2 (07/17/2024):  Pseudomonas aeruginosa & Providencia    IMAGING:     I have personally reviewed patient's imaging. Pertinent results noted below.  X-Ray Chest 1 View   Final Result      Cardiomegaly and mild congestive changes.         Electronically signed by: Miles Branham   Date:    07/18/2024   Time:    11:15      CT Abdomen Pelvis W Wo Contrast   Final Result      No acute process         Electronically signed by: Emiliano Cottrell   Date:    07/17/2024   Time:    16:26      X-Ray Chest AP Portable   Final Result      No acute chest disease is identified..      No significant abnormalities identified         Electronically signed by: Beni Medeiros   Date:    07/17/2024   Time:    12:12            IMPRESSION   Pseudomonas  Aeruginosa & Providencia Stuartii Bacteremia   -- Hx of Pseudomonas Bacteremia (2022)   -- Unknown Etiology of Source at this time (7/19/24)   -- No Hx of IBD, Recurrent UTI, Recent Surgeries, or prior prosthetics (joints/fractures)  SIRS (2/4; leukocytosis, febrile) - resolved   -- Likely 2/2 above bacteremia  Hx of Left Renal Cyst   -- S/p no intervention or further evaluation in [place]   -- CT A/P (7/17) without acute findings or concern for renal abscess  HFpEF (EF of 69% w/ Diastolic Dysfunction  Hx of HTN, HLD, Vit-D Deficiency, Gout    RECOMMENDATIONS:  - In setting of above Pseudomonas Aeruginosa & Providencia Stuartii bacteremia of unknown etiology at this time with no clear source, recommend antimicrobial therapy as below:   -- Recommend discontinuing Vancomycin w/ no MRSA or Gram (+) Spp   -- Recommend discontinuing Zosyn & transition to Cefepime 2g q8h with AMP-C resistant Providencia & assistance with Pseudomonal Coverage  - TTE (7/17) w/o appreciable valvular lesions  - Pending repeat blood cultures (7/20/24); x48 hours s/p initial cultures  - Patient to require x2 weeks Abx, pending sensitives to above Gram Negative Bacteria  - With possible etiology of Strongyloides origin (possible self-reinfection) causing gram negative bacteremia; pending Strongyloides IgG Antibodies    Thank you for the consult. We will follow along with you. Please do not hesitate to call with any questions or concerns.     Stevie Bahena MD  Internal Medicine PGY-III

## 2024-07-19 NOTE — PROGRESS NOTES
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 VSS)  . Patient reports that last night she began to experience episodes of chills. This morning when patient woke up was also having severe, diffuse weakness that made her concerned she would fall. Denies any headaches, vision changes, cold/congestion, sore throat, CP, SOB, abdominal pain, or urinary symptoms. No vomiting but does reports some nausea overnight. Also having mild, dry cough. No diarrhea. Patient does report intermittent pain in feet 2/2 gout, but is currently asymptomatic. States lives at home with family and usually able to ambulate on her own without any assistive devices.      In the ED, the patient was afebrile. HR 83, RR 18, /60. O2 98% on RA. CBC remarkable for leukocytosis of 18.6, H/H 10.0/31.6. BUN/Cr 33.3/1.26 (baseline Cr <1). Trop 0.565, LA 2.0, TSH wnl. COVID/FLU/RSV neg. UA trace protein and 1+ blood. CXR no acute process. EKG NSR. Blood cx pending. Patient was started on vanc and zosyn. Patient was admitted for leukocytosis of unknown origin.    Interval History: Overall feeling much better. Weakness improved. Tolerating PO intake without concerns. No acute complaints.      Review of Systems:  ROS completed and negative except as indicated above.     Objective:     Last 24 Hour Vital Signs:  BP  Min: 120/56  Max: 163/83  Temp  Av.7 °F (37.1 °C)  Min: 98.1 °F (36.7 °C)  Max: 99.6 °F (37.6 °C)  Pulse  Av  Min: 63  Max: 75  Resp  Av.5  Min: 17  Max: 18  SpO2  Av.2  %  Min: 94 %  Max: 98 %  I/O last 3 completed shifts:  In: 459.2 [I.V.:211.4; IV Piggyback:247.8]  Out: 3650 [Urine:3650]    Physical Exam  Vitals and nursing note reviewed.   Constitutional:       General: She is not in acute distress.     Appearance: She is not ill-appearing, toxic-appearing or diaphoretic.   HENT:      Mouth/Throat:      Mouth: Mucous membranes are moist.      Pharynx: No oropharyngeal exudate.   Eyes:      Conjunctiva/sclera: Conjunctivae normal.   Cardiovascular:      Rate and Rhythm: Normal rate and regular rhythm.      Heart sounds: Normal heart sounds. Heart sounds not distant. No murmur heard.     No friction rub. No gallop.   Pulmonary:      Effort: Pulmonary effort is normal. No accessory muscle usage, prolonged expiration or respiratory distress.      Breath sounds: Normal air entry. No decreased breath sounds or wheezing.   Abdominal:      General: Abdomen is flat. Bowel sounds are normal. There is no distension.      Palpations: Abdomen is soft.      Tenderness: There is no abdominal tenderness. There is no guarding.   Musculoskeletal:      Cervical back: Full passive range of motion without pain and normal range of motion. No rigidity.      Right lower leg: No edema.      Left lower leg: No edema.   Feet:      Right foot:      Skin integrity: Skin integrity normal.      Toenail Condition: Right toenails are abnormally thick and long.      Left foot:      Skin integrity: Callus present.      Toenail Condition: Left toenails are abnormally thick and long.   Lymphadenopathy:      Cervical: No cervical adenopathy.   Skin:     General: Skin is warm.      Capillary Refill: Capillary refill takes less than 2 seconds.   Neurological:      Mental Status: She is alert.       Laboratory:  Most Recent Data:  CBC:   Lab Results   Component Value Date    WBC 9.75 07/19/2024    HGB 10.2 (L) 07/19/2024    HCT 30.4 (L) 07/19/2024     07/19/2024    MCV 88.9 07/19/2024    RDW 14.7 07/19/2024  "    WBC Differential:   Recent Labs   Lab 07/15/24  0701 07/17/24  1102 07/18/24  0336 07/19/24  0515   WBC 6.94 18.60* 13.03* 9.75   HGB 10.7* 10.0* 9.4* 10.2*   HCT 33.6* 31.6* 29.4* 30.4*    286 236 253   MCV 92.3 90.8 91.0 88.9     BMP:   Lab Results   Component Value Date     07/19/2024    K 3.3 (L) 07/19/2024     07/19/2024    CO2 26 07/19/2024    BUN 17.1 07/19/2024    CREATININE 0.93 07/19/2024    CALCIUM 9.0 07/19/2024    MG 2.30 07/17/2024     LFTs:   Lab Results   Component Value Date    ALBUMIN 3.0 (L) 07/19/2024    BILITOT 1.0 07/19/2024    AST 54 (H) 07/19/2024    ALKPHOS 106 07/19/2024    ALT 30 07/19/2024     Coags: No results found for: "INR", "PROTIME", "PTT"  FLP:   Lab Results   Component Value Date    CHOL 173 07/15/2024    HDL 45 07/15/2024    TRIG 100 07/15/2024     DM:   Lab Results   Component Value Date    CREATININE 0.93 07/19/2024     Thyroid:   Lab Results   Component Value Date    TSH 0.936 07/17/2024      Anemia: No results found for: "IRON", "TIBC", "FERRITIN", "SATURATEDIRO"  No results found for: "[id number]"  No results found for: "FOLATE"     Cardiac:   Lab Results   Component Value Date    TROPONINI 0.582 (H) 07/17/2024    .5 (H) 07/17/2024         Microbiology Data:  Microbiology Results (last 7 days)       Procedure Component Value Units Date/Time    Blood culture #2 **CANNOT BE ORDERED STAT** [[id number]]  (Abnormal) Collected: 07/17/24 1232    Order Status: Completed Specimen: Blood from Hand, Right Updated: 07/19/24 1130     Blood Culture Pseudomonas aeruginosa     GRAM STAIN Gram Negative Rods      Seen in gram stain of broth only      1 of 1 Aerobic bottle positive    Blood culture #1 **CANNOT BE ORDERED STAT** [[id number]]  (Abnormal) Collected: 07/17/24 1232    Order Status: Completed Specimen: Blood from Arm, Right Updated: 07/19/24 1130     Blood Culture Providencia stuartii     GRAM STAIN Gram Negative Rods      Seen in gram stain of broth " only      1 of 2 Aerobic bottles positive    Chlamydia/GC, PCR [[id number]]     Order Status: Sent Specimen: Urine     BCID2 Panel [[id number]]  (Abnormal) Collected: 07/17/24 1232    Order Status: Completed Specimen: Blood from Arm, Right Updated: 07/18/24 1214     CTX-M (ESBL ) Not Detected     IMP (Cabapenemase ) Not Detected     KPC resistance gene (Carbapenemase ) Not Detected     mcr-1 N/A     mecA ID N/A     Comment: Note: Antimicrobial resistance can occur via multiple mechanisms. A Not Detected result for antimicrobial resistance gene(s) does not indicate antimicrobial susceptibility. Subculturing is required for species identification and susceptibility testing of   isolates.        mecA/C and MREJ (MRSA) gene N/A     NDM (Carbapenemase ) Not Detected     OXA-48-like (Carbapenemase ) Not Detected     Anthony/B (VRE gene) N/A     VIM (Carbapenemase ) Not Detected     Enterococcus faecalis Not Detected     Enterococcus faecium Not Detected     Listeria monocytogenes Not Detected     Staphylococcus spp. Not Detected     Staphylococcus aureus Not Detected     Staphylococcus epidermidis Not Detected     Staphylococcus lugdunensis Not Detected     Streptococcus spp. Not Detected     Streptococcus agalactiae (Group B) Not Detected     Streptococcus pneumoniae Not Detected     Streptococcus pyogenes (Group A) Not Detected     Acinetobacter calcoaceticus/baumannii complex Not Detected     Bacteroides fragilis Not Detected     Enterobacterales Detected     Enterobacter cloacae complex Not Detected     Escherichia coli Not Detected     Klebsiella aerogenes Not Detected     Klebsiella oxytoca Not Detected     Klebsiella pneumoniae group Not Detected     Proteus spp. Not Detected     Salmonella spp. Not Detected     Serratia marcescens Not Detected     Haemophilus influenzae Not Detected     Neisseria meningitidis Not Detected     Pseudomonas aeruginosa Detected      Stenotrophomonas maltophilia Not Detected     Candida albicans Not Detected     Candida auris Not Detected     Candida glabrata Not Detected     Candida krusei Not Detected     Candida parapsilosis Not Detected     Candida tropicalis Not Detected     Cryptococcus neoformans/gattii Not Detected    Narrative:      The SigFig BCID2 Panel is a multiplexed nucleic acid test intended for the use with SigFig® FilmArray® 2.0 or SigFig® FilmArray® CereScan Systems for the simultaneous qualitative detection and identification of multiple bacterial and yeast nucleic acids and select genetic determinants associated with antimicrobial resistance.  The BioFire BCID2 Panel test is performed directly on blood culture samples identified as positive by a continuous monitoring blood culture system.  Results are intended to be interpreted in conjunction with Gram stain results.             Other Results:    Radiology:  Imaging Results              X-Ray Chest 1 View (Final result)  Result time 07/18/24 11:15:43      Final result by Miles Branham MD (07/18/24 11:15:43)                   Impression:      Cardiomegaly and mild congestive changes.      Electronically signed by: Miles Branham  Date:    07/18/2024  Time:    11:15               Narrative:    EXAMINATION:  XR CHEST 1 VIEW    CLINICAL HISTORY:  cough;    TECHNIQUE:  One view    COMPARISON:  July 17, 2024.    FINDINGS:  Cardiopericardial silhouette enlarged appearance similar.  Lungs interstitial markings prominence suggest mild congestive process.  There is no focally dense consolidation or significant fluid within the pleural spaces.  No pneumothorax.                                       CT Abdomen Pelvis W Wo Contrast (Final result)  Result time 07/17/24 16:26:13      Final result by Jenniffer Cottrell MD (07/17/24 16:26:13)                   Impression:      No acute process      Electronically signed by: Emiliano Cottrell  Date:    07/17/2024  Time:    16:26                Narrative:    EXAMINATION:  CT ABDOMEN PELVIS W WO CONTRAST    CLINICAL HISTORY:  Abdominal abscess/infection suspected;    TECHNIQUE:  Low dose axial images, sagittal and coronal reformations were obtained from the lung bases to the pubic symphysis following the IV administration of  contrast.  Pre contrasted imaging was performed as well. Automatic exposure control is utilized to reduce patient radiation exposure.    COMPARISON:  03/20/2023    FINDINGS:  There is mild bibasilar atelectasis..    The liver appears normal.  No liver mass or lesion is seen.  Portal and hepatic veins appear normal.    The gallbladder appears grossly unremarkable.    The pancreas appears normal.  No pancreatic mass or lesion is seen.    The spleen appears normal.  No splenic mass or lesion is seen.    The adrenal glands appear normal.  No adrenal nodule is seen.    The kidneys are normal in size.  No hydronephrosis is seen.  No hydroureter is seen.  No nephrolithiasis or ureteral stone is seen.  There is some punctate cysts seen in the kidneys bilaterally.  No focal mass is seen.    Urinary bladder appears normal.    No colitis is seen.  There are multiple diverticuli in the sigmoid colon but no diverticulitis is seen.  No colonic mass or lesion or inflammatory process is seen.  The appendix appears normal.    Atherosclerotic plaque is seen in the abdominal aorta and mesenteric vessels.    No free air is seen.  No free fluid is seen.    The bones appear grossly unremarkable.                                       X-Ray Chest AP Portable (Final result)  Result time 07/17/24 12:12:57      Final result by Beni Medeiros MD (07/17/24 12:12:57)                   Impression:      No acute chest disease is identified..    No significant abnormalities identified      Electronically signed by: Beni Medeiros  Date:    07/17/2024  Time:    12:12               Narrative:    EXAMINATION:  XR CHEST AP PORTABLE    CLINICAL  HISTORY:  cough;, .    FINDINGS:  No alveolar consolidation, effusion, or pneumothorax is seen.   The thoracic aorta is normal  cardiac silhouette is at the upper limits of normal, central pulmonary vessels and mediastinum are normal in size and are grossly unremarkable.   visualized osseous structures are grossly unremarkable.                                      Current Medications:     Infusions:   lactated ringers   Intravenous Continuous 50 mL/hr at 07/18/24 1918 Rate Verify at 07/18/24 1918        Scheduled:   amLODIPine  10 mg Oral Daily    atorvastatin  20 mg Oral Daily    heparin (porcine)  5,000 Units Subcutaneous Q8H    triamterene-hydrochlorothiazide 37.5-25 mg  1 tablet Oral Daily    vancomycin (VANCOCIN) IV (PEDS and ADULTS)  1,000 mg Intravenous Q24H        PRN:    Current Facility-Administered Medications:     acetaminophen, 650 mg, Oral, Q6H PRN    dextrose 10%, 12.5 g, Intravenous, PRN    dextrose 10%, 25 g, Intravenous, PRN    glucagon (human recombinant), 1 mg, Intramuscular, PRN    glucose, 16 g, Oral, PRN    glucose, 24 g, Oral, PRN    naloxone, 0.02 mg, Intravenous, PRN    ondansetron, 4 mg, Intravenous, Q8H PRN    sodium chloride 0.9%, 10 mL, Intravenous, Q12H PRN      Assessment & Plan:     Leukocytosis  Bacteremia       - WBC 18.60 on admission, improved to 13.03       - CXR no signs of infectious process, UA unremarkable, no visible skin wounds       - Repeat CXR cardiomegaly and mild congestive changes       - Patient has hx of chronic kidney cysts with last imaging 3/2023. CT abdomen pelvis w wo contrast no acute process       - Blood cx 2/2 positive, BICD2 panel positive for enterobacterales and pesudomonas, unclear source at this time       - Continue vanc, zosyn for empiric coverage     DEVIN - resolved        - Cr 1.26, baseline <1        - Cr 0.94        - Patient reports decreased fluid intake        - Given patient's peripheral edema will continue with gentle fluid resuscitation      Peripheral edema       - Chronic, patient reports has at baseline at home       - Repeat echo 7/17 EF 69%, mild aortic regurgitation, PHTN with PA 37 mm Hg       - No symptoms of SOB, no crackles on PE, continue to monitor and will diurese as needed     HTN       - On triamterene-HCTZ 37.5-25 mg daily, benzapril 40 mg daily, and amlodipine 10 mg daily at home       - Resume home meds           CODE STATUS: Full  Access: PIV  Antibiotics: Vanc, zosyn  Diet: Cardiac  DVT Prophylaxis: Heparin  GI Prophylaxis: None  Fluids: None        Disposition: Bacteremia of unknown origin. Continue empiric abx coverage.         Rm Portillo MD  [facility]

## 2024-07-19 NOTE — PLAN OF CARE
Problem: Occupational Therapy  Goal: Occupational Therapy Goal  Description: Perform lower body dressing with Mod I.  Perform toilet transfer with Mod I using RW and grab bars.  Perform grooming at sink in standing with mod I and use of RW.  Perform toilet routine/hygiene with mod I using RW.  Outcome: Progressing

## 2024-07-19 NOTE — PT/OT/SLP EVAL
Physical Therapy Evaluation    Patient Name:  Leda Gallardo   MRN:  [id number]    Recommendations:     Therapy Intensity Recommendations at Discharge: Low Intensity Therapy  Discharge Equipment Recommendations: grab bar, shower chair (TBD - rolling walker vs straight cane)   Equipment to be obtained for discharge: shower chair...AND...TBD - rolling walker vs straight   Barriers to discharge: fall risk and decreased endurance    Assessment:     Leda Gallardo is a 89 y.o. female admitted with a medical diagnosis of:  1. Sepsis, due to unspecified organism, unspecified whether acute organ dysfunction present    2. Weakness    3. Fatigue    4. Elevated troponin I level    5. Chest pain    6. Lower extremity edema       Patient Active Problem List   Diagnosis    Hyperlipidemia    Hypertension    Obesity    Renal mass    Vitamin D deficiency      She presents with the following impairments/functional limitations:  gait instability, impaired balance, impaired endurance, impaired functional mobility, impaired self care skills.    Rehab Prognosis: Good.    Patient would benefit from continued skilled acute PT services to: address above listed impairments/functional limitations; receive patient/caregiver education; reduce fall risk; and maximize independency/safety with functional mobility.    -continued: cardiac chair positioning in bed, up-to-chair, ambulation, with progression of gait distance/frequency/duration and speed, as tolerated/appropriate, with assistance and supervision     Recent Surgery: * No surgery found *      Plan:     During this hospitalization, patient to be seen 5 x/week to address the identified impairments/functional limitations via gait training, therapeutic activities, therapeutic exercises and progress toward the established goals.    Plan of Care Expires:  08/16/24    Subjective     Communicated with patient's nurse Connie prior to session.    Patient agreeable to participate in evaluation.  (patient and patients daughter agreed to transition to bedside chair for breakfast)    Chief Complaint: per patient - hungry, per patients daughter - we are waiting on breakfast tray  Patient/Family Comments/goals: home  Pain/Comfort:  Pain Rating 1: 0/10  Pain Addressed 1: Nurse notified  Pain Rating Post-Intervention 1: 0/10    Patients cultural, spiritual, Confucianist conflicts given the current situation: no    Social History  Living Environment: Patient lives with their family (daughter, niece, multiple children) in a single level home, with  1 steps (threshold) , with tub.  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  Equipment Used at Home: grab bar  Equipment owned (not currently used): none.  Assistance Upon Discharge: family.    Hand dominance: right    Patient denied lightheadedness/dizziness.  Patient denied extremity tingling/numbness. (per patient - intermittent Lt hand numbness (like when hand goes to sleep) - but not at this time)  Patient denied current swallowing difficulty.  Patient denied 'new' vision impairment.     Objective:     OT Kapil in room for evaluation  PM&R TECH Pj in/out of room providing supplies and bedside chair    Patient found supine in bed, with HOB elevated, and with bed rails up bilateral HOB, left FOB, and right FOB with telemetry, peripheral IV, PureWick (patients daughter in room) upon PT entry to room.    General Precautions: Standard, fall   Orthopedic Precautions:N/A   Braces: N/A  Respiratory Status: room air  SAT O2 Check: n/a    Exams:  Orientation: Patient is oriented to person, place, time, situation  Commands: Patient follows multi-step verbal commands  Fine Motor Coordination:     -     Intact: Rapid alternating ankle  DF/PF  Sensation:    -     Intact: light/touch bilat lower extremity  BILAT UE ROM/strength - defer to OT - see OT note for details  RLE ROM: WFL  RLE Strength: WFL  LLE ROM: WFL  LLE Strength: WFL    Functional Mobility:    Bed Mobility:  Rolling Left: supervision  Scooting: supervision  Supine to Sit: supervision  with no cues required  with HOB elevated, hand rail, and firm mattress    Transfers:  Sit to Stand: contact guard assistance with no assistive device  with no cues required    Gait:  Patient ambulated 10ft (fwd/bwd/side steps to FOB and then back to HOB to bedside chair) with no assistive device and hand-held assist and contact guard assistance.  Patient demonstrates :       unsteady gait       decreased devin       decreased bilateral step length       wide base of support       decreased bilateral foot/floor clearance       inconsistent bilateral foot placement       shuffle gait       patient reaching for bed surface and foot board during gait session    Other Mobility:  not assessed    Balance:  Sit  Patient demonstrated static balance on level surface with independence with no verbal cues.  Patient demonstrated dynamic balance on level surface with modified independence-independence with no verbal cues during moderate excursions.  Stand  Patient demonstrated static balance on level surface  using no device with contact guard assistance-minimal assistance with no verbal cues    Additional Treatment Session  n/a    Patient left sitting in chair with telemetry, peripheral IV, PureWick (patients daughter in room) with all lines intact, call button in reach, tray table at bedside w/ breakfast tray set-up, patient's nurse notified, and patients daughter present.    Education     Patient educated on the importance of early mobility to prevent functional decline during hospital stay.  Patient educated on and assisted with functional mobility as noted above.  Patient educated on PT Plan of Care and role of  PT in acute care.  Patient was instructed to utilize staff assistance for mobility/transfers.  White board updated regarding patient's safest level of mobility with staff assistance.    Goals     Multidisciplinary Problems       Physical Therapy Goals       Problem: Physical Therapy    Goal Priority Disciplines Outcome Goal Variances Interventions   Physical Therapy Goal     PT, PT/OT      Description: ESTABLISHED 07/19/2024  Goals to be met by: DISCHARGE  Patient will increase functional independence with mobility by performing:  -. Supine to sit with Modified [place]  -. Sit to supine with Modified [place]  -. Rolling to Left and Right with Modified [place]  -. Sit to stand transfer with Supervision  -. Gait  x 130 feet with Supervision using Rolling Walker           History:     Past Medical History:   Diagnosis Date    Essential (primary) hypertension     Heart disease      Past Surgical History:   Procedure Laterality Date    HYSTERECTOMY      LEFT HEART CATHETERIZATION  06/11/2021     Time Tracking:     PT Received On: 07/19/24  PT Start Time: 0901     PT Stop Time: 0922  PT Total Time (min): 21 min     Billable Minutes: Evaluation 21  Non-Billable Minutes: n/a  07/19/2024

## 2024-07-20 LAB
ALBUMIN SERPL-MCNC: 2.9 G/DL (ref 3.4–4.8)
ALBUMIN/GLOB SERPL: 0.8 RATIO (ref 1.1–2)
ALP SERPL-CCNC: 141 UNIT/L (ref 40–150)
ALT SERPL-CCNC: 56 UNIT/L (ref 0–55)
ANION GAP SERPL CALC-SCNC: 7 MEQ/L
AST SERPL-CCNC: 67 UNIT/L (ref 5–34)
BACTERIA BLD CULT: ABNORMAL
BASOPHILS # BLD AUTO: 0.05 X10(3)/MCL
BASOPHILS NFR BLD AUTO: 0.6 %
BILIRUB SERPL-MCNC: 0.8 MG/DL
BUN SERPL-MCNC: 15.3 MG/DL (ref 9.8–20.1)
C TRACH DNA SPEC QL NAA+PROBE: NOT DETECTED
CALCIUM SERPL-MCNC: 9.2 MG/DL (ref 8.4–10.2)
CHLORIDE SERPL-SCNC: 107 MMOL/L (ref 98–107)
CO2 SERPL-SCNC: 23 MMOL/L (ref 23–31)
CREAT SERPL-MCNC: 0.93 MG/DL (ref 0.55–1.02)
CREAT/UREA NIT SERPL: 16
CRP SERPL-MCNC: 59.2 MG/L
EOSINOPHIL # BLD AUTO: 0.68 X10(3)/MCL (ref 0–0.9)
EOSINOPHIL NFR BLD AUTO: 7.6 %
ERYTHROCYTE [DISTWIDTH] IN BLOOD BY AUTOMATED COUNT: 14.6 % (ref 11.5–17)
ERYTHROCYTE [SEDIMENTATION RATE] IN BLOOD: 67 MM/HR (ref 0–20)
GFR SERPLBLD CREATININE-BSD FMLA CKD-EPI: 60 ML/MIN/1.73/M2
GLOBULIN SER-MCNC: 3.5 GM/DL (ref 2.4–3.5)
GLUCOSE SERPL-MCNC: 112 MG/DL (ref 82–115)
GRAM STN SPEC: ABNORMAL
HAV AB SER QL IA: NONREACTIVE
HCT VFR BLD AUTO: 30.1 % (ref 37–47)
HGB BLD-MCNC: 9.6 G/DL (ref 12–16)
IMM GRANULOCYTES # BLD AUTO: 0.03 X10(3)/MCL (ref 0–0.04)
IMM GRANULOCYTES NFR BLD AUTO: 0.3 %
LYMPHOCYTES # BLD AUTO: 1.75 X10(3)/MCL (ref 0.6–4.6)
LYMPHOCYTES NFR BLD AUTO: 19.5 %
MCH RBC QN AUTO: 28.7 PG (ref 27–31)
MCHC RBC AUTO-ENTMCNC: 31.9 G/DL (ref 33–36)
MCV RBC AUTO: 89.9 FL (ref 80–94)
MONOCYTES # BLD AUTO: 0.89 X10(3)/MCL (ref 0.1–1.3)
MONOCYTES NFR BLD AUTO: 9.9 %
N GONORRHOEA DNA SPEC QL NAA+PROBE: NOT DETECTED
NEUTROPHILS # BLD AUTO: 5.56 X10(3)/MCL (ref 2.1–9.2)
NEUTROPHILS NFR BLD AUTO: 62.1 %
NRBC BLD AUTO-RTO: 0 %
PLATELET # BLD AUTO: 266 X10(3)/MCL (ref 130–400)
PMV BLD AUTO: 10.1 FL (ref 7.4–10.4)
POTASSIUM SERPL-SCNC: 3.8 MMOL/L (ref 3.5–5.1)
PROT SERPL-MCNC: 6.4 GM/DL (ref 5.8–7.6)
RBC # BLD AUTO: 3.35 X10(6)/MCL (ref 4.2–5.4)
SODIUM SERPL-SCNC: 137 MMOL/L (ref 136–145)
SOURCE (OHS): NORMAL
WBC # BLD AUTO: 8.96 X10(3)/MCL (ref 4.5–11.5)

## 2024-07-20 PROCEDURE — 86708 HEPATITIS A ANTIBODY: CPT | Performed by: NURSE PRACTITIONER

## 2024-07-20 PROCEDURE — 80053 COMPREHEN METABOLIC PANEL: CPT

## 2024-07-20 PROCEDURE — 87491 CHLMYD TRACH DNA AMP PROBE: CPT | Performed by: NURSE PRACTITIONER

## 2024-07-20 PROCEDURE — 87591 N.GONORRHOEAE DNA AMP PROB: CPT | Performed by: NURSE PRACTITIONER

## 2024-07-20 PROCEDURE — 21400001 HC TELEMETRY ROOM

## 2024-07-20 PROCEDURE — 97530 THERAPEUTIC ACTIVITIES: CPT

## 2024-07-20 PROCEDURE — 63600175 PHARM REV CODE 636 W HCPCS

## 2024-07-20 PROCEDURE — 85652 RBC SED RATE AUTOMATED: CPT | Performed by: NURSE PRACTITIONER

## 2024-07-20 PROCEDURE — 36415 COLL VENOUS BLD VENIPUNCTURE: CPT | Performed by: STUDENT IN AN ORGANIZED HEALTH CARE EDUCATION/TRAINING PROGRAM

## 2024-07-20 PROCEDURE — 63600175 PHARM REV CODE 636 W HCPCS: Performed by: STUDENT IN AN ORGANIZED HEALTH CARE EDUCATION/TRAINING PROGRAM

## 2024-07-20 PROCEDURE — 25000003 PHARM REV CODE 250: Performed by: STUDENT IN AN ORGANIZED HEALTH CARE EDUCATION/TRAINING PROGRAM

## 2024-07-20 PROCEDURE — 25000003 PHARM REV CODE 250

## 2024-07-20 PROCEDURE — 25000003 PHARM REV CODE 250: Performed by: INTERNAL MEDICINE

## 2024-07-20 PROCEDURE — 85025 COMPLETE CBC W/AUTO DIFF WBC: CPT

## 2024-07-20 PROCEDURE — 86140 C-REACTIVE PROTEIN: CPT | Performed by: NURSE PRACTITIONER

## 2024-07-20 PROCEDURE — 11000001 HC ACUTE MED/SURG PRIVATE ROOM

## 2024-07-20 PROCEDURE — 87040 BLOOD CULTURE FOR BACTERIA: CPT | Performed by: STUDENT IN AN ORGANIZED HEALTH CARE EDUCATION/TRAINING PROGRAM

## 2024-07-20 RX ORDER — COLCHICINE 0.6 MG/1
0.6 TABLET, FILM COATED ORAL DAILY
Status: DISCONTINUED | OUTPATIENT
Start: 2024-07-20 | End: 2024-07-23 | Stop reason: HOSPADM

## 2024-07-20 RX ORDER — ALLOPURINOL 100 MG/1
200 TABLET ORAL DAILY
Status: DISCONTINUED | OUTPATIENT
Start: 2024-07-20 | End: 2024-07-23 | Stop reason: HOSPADM

## 2024-07-20 RX ORDER — COLCHICINE 0.6 MG/1
1.2 TABLET, FILM COATED ORAL ONCE
Status: COMPLETED | OUTPATIENT
Start: 2024-07-20 | End: 2024-07-20

## 2024-07-20 RX ADMIN — HEPARIN SODIUM 5000 UNITS: 5000 INJECTION INTRAVENOUS; SUBCUTANEOUS at 05:07

## 2024-07-20 RX ADMIN — COLCHICINE 0.6 MG: 0.6 TABLET, FILM COATED ORAL at 03:07

## 2024-07-20 RX ADMIN — AMLODIPINE BESYLATE 10 MG: 10 TABLET ORAL at 08:07

## 2024-07-20 RX ADMIN — HEPARIN SODIUM 5000 UNITS: 5000 INJECTION INTRAVENOUS; SUBCUTANEOUS at 10:07

## 2024-07-20 RX ADMIN — CEFEPIME 2 G: 2 INJECTION, POWDER, FOR SOLUTION INTRAVENOUS at 12:07

## 2024-07-20 RX ADMIN — CEFEPIME 2 G: 2 INJECTION, POWDER, FOR SOLUTION INTRAVENOUS at 08:07

## 2024-07-20 RX ADMIN — CEFEPIME 2 G: 2 INJECTION, POWDER, FOR SOLUTION INTRAVENOUS at 04:07

## 2024-07-20 RX ADMIN — ATORVASTATIN CALCIUM 20 MG: 20 TABLET, FILM COATED ORAL at 08:07

## 2024-07-20 RX ADMIN — TRIAMTERENE AND HYDROCHLOROTHIAZIDE 1 TABLET: 37.5; 25 CAPSULE ORAL at 08:07

## 2024-07-20 RX ADMIN — ALLOPURINOL 200 MG: 100 TABLET ORAL at 01:07

## 2024-07-20 RX ADMIN — COLCHICINE 1.2 MG: 0.6 TABLET, FILM COATED ORAL at 01:07

## 2024-07-20 RX ADMIN — LISINOPRIL 40 MG: 10 TABLET ORAL at 08:07

## 2024-07-20 RX ADMIN — SODIUM CHLORIDE, POTASSIUM CHLORIDE, SODIUM LACTATE AND CALCIUM CHLORIDE: 600; 310; 30; 20 INJECTION, SOLUTION INTRAVENOUS at 03:07

## 2024-07-20 RX ADMIN — HEPARIN SODIUM 5000 UNITS: 5000 INJECTION INTRAVENOUS; SUBCUTANEOUS at 01:07

## 2024-07-20 NOTE — CARE UPDATE
"Called to pt room by family r/t pt coughing on chicken. She was sitting with HOB up 90 degrees. She was able to swallow water and stopped coughing. No food particles came up. Pt states she thinks it just "went the wrong way." She has no teeth but does have dentures at home to which she doesn't wear. Family states she doesn't wear them to eat and usually tolerates regular foods. Pt was able to continue eating without issues. Family at bedside. Call light in reach.   "

## 2024-07-20 NOTE — PT/OT/SLP PROGRESS
Physical Therapy Treatment    Patient Name:  Leda Gallardo   MRN:  [id number]    Recommendations     Therapy Intensity Recommendations at Discharge: Low Intensity Therapy  Discharge Equipment Recommendations: grab bar, shower chair (TBD - rolling walker vs straight cane)   Barriers to discharge: fall risk and severity of deficits    Assessment     Leda Gallardo is a 89 y.o. female admitted with a medical diagnosis of   Patient Active Problem List   Diagnosis    Hyperlipidemia    Hypertension    Obesity    Renal mass    Vitamin D deficiency     .    She presents with the following impairments/functional limitations:  weakness, impaired endurance, impaired functional mobility, gait instability, pain.    Rehab Prognosis: Good.    Patient would benefit from continued skilled acute PT services to: address above listed impairments/functional limitations; receive patient/caregiver education; reduce fall risk; and maximize independency/safety with functional mobility.    Recent Surgery: * No surgery found *      Plan     During this hospitalization, patient to be seen 5 x/week to address the identified impairments/functional limitations via gait training, therapeutic activities, therapeutic exercises, neuromuscular re-education and progress toward the established goals.    Plan of Care Expires:  08/16/24    Subjective     Communicated with patient's nurse  prior to session.    Patient agreeable to participate in treatment session.    Chief Complaint: Patient reports inc foot pain from gout throughout last night. States it feels better today but she is unable to walk on it  Patient/Family Comments/goals: to improve strength  Pain/Comfort:  Pain Rating 1: 5/10  Location - Side 1: Right  Location 1: foot  Pain Rating Post-Intervention 1: 5/10    Objective     Patient found supine in bed with peripheral IV  upon PT entry to room.    General Precautions: Standard, fall   Orthopedic Precautions:N/A   Braces:    Respiratory  Status: room air    Functional Mobility:    Bed Mobility:  Rolling Left: contact guard assistance  Rolling Right: contact guard assistance  Supine to Sit: contact guard assistance  Sit to Supine: contact guard assistance    Transfers:  Sit to Stand: contact guard assistance with rolling walker    Gait:  Patient ambulated 4ft with rolling walker and contact guard assistance.  Patient demonstrates :       4 side steps       unsteady gait.    Other Mobility:  Therapeutic Exercises performed:   2 sets times 10 repetitions  active range of motion  bilat lower extremity       -standing exercises:              marches            mini-squats            WS    Patient left sitting in chair with all lines intact, call button in reach, tray table at bedside, and patient's nurse notified.    Goals     Multidisciplinary Problems       Physical Therapy Goals          Problem: Physical Therapy    Goal Priority Disciplines Outcome Goal Variances Interventions   Physical Therapy Goal     PT, PT/OT Progressing     Description: ESTABLISHED 07/19/2024  Goals to be met by: DISCHARGE  Patient will increase functional independence with mobility by performing:  -. Supine to sit with Modified [place]  -. Sit to supine with Modified [place]  -. Rolling to Left and Right with Modified [place]  -. Sit to stand transfer with Supervision  -. Gait  x 130 feet with Supervision using Rolling Walker                     Time Tracking     PT Received On: 07/20/24  PT Start Time: 0955     PT Stop Time: 1016  PT Total Time (min): 21 min     Billable Minutes: Gait Training 21 min    07/20/2024

## 2024-07-20 NOTE — PROGRESS NOTES
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 VSS)  . Patient reports that last night she began to experience episodes of chills. This morning when patient woke up was also having severe, diffuse weakness that made her concerned she would fall. Denies any headaches, vision changes, cold/congestion, sore throat, CP, SOB, abdominal pain, or urinary symptoms. No vomiting but does reports some nausea overnight. Also having mild, dry cough. No diarrhea. Patient does report intermittent pain in feet 2/2 gout, but is currently asymptomatic. States lives at home with family and usually able to ambulate on her own without any assistive devices.      In the ED, the patient was afebrile. HR 83, RR 18, /60. O2 98% on RA. CBC remarkable for leukocytosis of 18.6, H/H 10.0/31.6. BUN/Cr 33.3/1.26 (baseline Cr <1). Trop 0.565, LA 2.0, TSH wnl. COVID/FLU/RSV neg. UA trace protein and 1+ blood. CXR no acute process. EKG NSR. Blood cx pending. Patient was started on vanc and zosyn. Patient was admitted for leukocytosis of unknown origin.    Interval History: NAEON. Patient feeling significantly improved from admission.       Review of Systems:  ROS completed and negative except as indicated above.     Objective:     Last 24 Hour Vital Signs:  BP  Min: 144/66  Max: 163/83  Temp  Av.4 °F (36.9 °C)  Min: 98.1 °F (36.7 °C)  Max: 99 °F (37.2 °C)  Pulse  Av.1  Min: 61  Max: 76  Resp  Av.6  Min: 17  Max: 18  SpO2  Av.8 %  Min: 97 %  Max: 99 %  I/O last 3 completed  shifts:  In: 2115.4 [I.V.:1521; IV Piggyback:594.4]  Out: 3650 [Urine:3650]    Physical Exam  Vitals and nursing note reviewed.   Constitutional:       General: She is not in acute distress.     Appearance: She is not ill-appearing, toxic-appearing or diaphoretic.   Cardiovascular:      Rate and Rhythm: Normal rate and regular rhythm.      Heart sounds: Normal heart sounds. Heart sounds not distant. No murmur heard.     No friction rub. No gallop.   Pulmonary:      Effort: Pulmonary effort is normal. No accessory muscle usage, prolonged expiration or respiratory distress.      Breath sounds: Normal breath sounds and air entry. No decreased breath sounds or wheezing.   Abdominal:      General: Abdomen is flat. Bowel sounds are normal. There is no distension.      Palpations: Abdomen is soft.      Tenderness: There is no abdominal tenderness. There is no guarding.   Musculoskeletal:      Cervical back: Normal range of motion.      Right lower leg: No edema.      Left lower leg: No edema.   Skin:     General: Skin is warm.      Capillary Refill: Capillary refill takes less than 2 seconds.   Neurological:      Mental Status: She is alert.       Laboratory:  Most Recent Data:  CBC:   Lab Results   Component Value Date    WBC 8.96 07/20/2024    HGB 9.6 (L) 07/20/2024    HCT 30.1 (L) 07/20/2024     07/20/2024    MCV 89.9 07/20/2024    RDW 14.6 07/20/2024     WBC Differential:   Recent Labs   Lab 07/15/24  0701 07/17/24  1102 07/18/24  0336 07/19/24  0515 07/20/24  0452   WBC 6.94 18.60* 13.03* 9.75 8.96   HGB 10.7* 10.0* 9.4* 10.2* 9.6*   HCT 33.6* 31.6* 29.4* 30.4* 30.1*    286 236 253 266   MCV 92.3 90.8 91.0 88.9 89.9     BMP:   Lab Results   Component Value Date     07/20/2024    K 3.8 07/20/2024     07/20/2024    CO2 23 07/20/2024    BUN 15.3 07/20/2024    CREATININE 0.93 07/20/2024    CALCIUM 9.2 07/20/2024    MG 2.30 07/17/2024     LFTs:   Lab Results   Component Value Date    ALBUMIN 2.9  "(L) 07/20/2024    BILITOT 0.8 07/20/2024    AST 67 (H) 07/20/2024    ALKPHOS 141 07/20/2024    ALT 56 (H) 07/20/2024     Coags: No results found for: "INR", "PROTIME", "PTT"  FLP:   Lab Results   Component Value Date    CHOL 173 07/15/2024    HDL 45 07/15/2024    TRIG 100 07/15/2024     DM:   Lab Results   Component Value Date    CREATININE 0.93 07/20/2024     Thyroid:   Lab Results   Component Value Date    TSH 0.936 07/17/2024      Anemia: No results found for: "IRON", "TIBC", "FERRITIN", "SATURATEDIRO"  No results found for: "GIQWTPZW01"  No results found for: "FOLATE"     Cardiac:   Lab Results   Component Value Date    TROPONINI 0.582 (H) 07/17/2024    .5 (H) 07/17/2024         Microbiology Data:  Microbiology Results (last 7 days)       Procedure Component Value Units Date/Time    Blood culture #1 **CANNOT BE ORDERED STAT** [[id number]]  (Abnormal) Collected: 07/17/24 1232    Order Status: Completed Specimen: Blood from Arm, Right Updated: 07/20/24 0730     Blood Culture Gram-negative Rods     GRAM STAIN Gram Negative Rods      Seen in gram stain of broth only      1 of 2 Aerobic bottles positive    Blood culture #2 **CANNOT BE ORDERED STAT** [[id number]]  (Abnormal)  (Susceptibility) Collected: 07/17/24 1232    Order Status: Completed Specimen: Blood from Hand, Right Updated: 07/20/24 0726     Blood Culture Pseudomonas aeruginosa     GRAM STAIN Gram Negative Rods      Seen in gram stain of broth only      1 of 1 Aerobic bottle positive    Blood Culture [[id number]] Collected: 07/20/24 0452    Order Status: Sent Specimen: Blood from Arm, Left Updated: 07/20/24 0508    Blood Culture [[id number]] Collected: 07/20/24 0457    Order Status: Sent Specimen: Blood from Hand, Left Updated: 07/20/24 0508    Chlamydia/GC, PCR [[id number]] Collected: 07/20/24 0038    Order Status: Completed Specimen: Urine Updated: 07/20/24 0227     Chlamydia trachomatis PCR Not Detected     N. gonorrhea PCR Not Detected     Source " Urine    Narrative:      The Xpert CT/NG test, performed on the GeneXpert system is a qualitative in vitro real-time polymerase chain reaction (PCR) test for the automated detected and differentiation for genomic DNA from Chlamydia trachomatis (CT) and/or Neisseria gonorrhoeae (NG).    BCID2 Panel [[id number]]  (Abnormal) Collected: 07/17/24 1232    Order Status: Completed Specimen: Blood from Arm, Right Updated: 07/18/24 1214     CTX-M (ESBL ) Not Detected     IMP (Cabapenemase ) Not Detected     KPC resistance gene (Carbapenemase ) Not Detected     mcr-1 N/A     mecA ID N/A     Comment: Note: Antimicrobial resistance can occur via multiple mechanisms. A Not Detected result for antimicrobial resistance gene(s) does not indicate antimicrobial susceptibility. Subculturing is required for species identification and susceptibility testing of   isolates.        mecA/C and MREJ (MRSA) gene N/A     NDM (Carbapenemase ) Not Detected     OXA-48-like (Carbapenemase ) Not Detected     Anthony/B (VRE gene) N/A     VIM (Carbapenemase ) Not Detected     Enterococcus faecalis Not Detected     Enterococcus faecium Not Detected     Listeria monocytogenes Not Detected     Staphylococcus spp. Not Detected     Staphylococcus aureus Not Detected     Staphylococcus epidermidis Not Detected     Staphylococcus lugdunensis Not Detected     Streptococcus spp. Not Detected     Streptococcus agalactiae (Group B) Not Detected     Streptococcus pneumoniae Not Detected     Streptococcus pyogenes (Group A) Not Detected     Acinetobacter calcoaceticus/baumannii complex Not Detected     Bacteroides fragilis Not Detected     Enterobacterales Detected     Enterobacter cloacae complex Not Detected     Escherichia coli Not Detected     Klebsiella aerogenes Not Detected     Klebsiella oxytoca Not Detected     Klebsiella pneumoniae group Not Detected     Proteus spp. Not Detected     Salmonella spp. Not  Detected     Serratia marcescens Not Detected     Haemophilus influenzae Not Detected     Neisseria meningitidis Not Detected     Pseudomonas aeruginosa Detected     Stenotrophomonas maltophilia Not Detected     Candida albicans Not Detected     Candida auris Not Detected     Candida glabrata Not Detected     Candida krusei Not Detected     Candida parapsilosis Not Detected     Candida tropicalis Not Detected     Cryptococcus neoformans/gattii Not Detected    Narrative:      The Snapsheet BCID2 Panel is a multiplexed nucleic acid test intended for the use with Cervilenz® 2.0 or Cervilenz® Razient Systems for the simultaneous qualitative detection and identification of multiple bacterial and yeast nucleic acids and select genetic determinants associated with antimicrobial resistance.  The BioFire BCID2 Panel test is performed directly on blood culture samples identified as positive by a continuous monitoring blood culture system.  Results are intended to be interpreted in conjunction with Gram stain results.             Other Results:    Radiology:  Imaging Results              X-Ray Chest 1 View (Final result)  Result time 07/18/24 11:15:43      Final result by Miles Branham MD (07/18/24 11:15:43)                   Impression:      Cardiomegaly and mild congestive changes.      Electronically signed by: Miles Branham  Date:    07/18/2024  Time:    11:15               Narrative:    EXAMINATION:  XR CHEST 1 VIEW    CLINICAL HISTORY:  cough;    TECHNIQUE:  One view    COMPARISON:  July 17, 2024.    FINDINGS:  Cardiopericardial silhouette enlarged appearance similar.  Lungs interstitial markings prominence suggest mild congestive process.  There is no focally dense consolidation or significant fluid within the pleural spaces.  No pneumothorax.                                       CT Abdomen Pelvis W Wo Contrast (Final result)  Result time 07/17/24 16:26:13      Final result by Jenniffer Cottrell MD  (07/17/24 16:26:13)                   Impression:      No acute process      Electronically signed by: Emiliano Cottrell  Date:    07/17/2024  Time:    16:26               Narrative:    EXAMINATION:  CT ABDOMEN PELVIS W WO CONTRAST    CLINICAL HISTORY:  Abdominal abscess/infection suspected;    TECHNIQUE:  Low dose axial images, sagittal and coronal reformations were obtained from the lung bases to the pubic symphysis following the IV administration of  contrast.  Pre contrasted imaging was performed as well. Automatic exposure control is utilized to reduce patient radiation exposure.    COMPARISON:  03/20/2023    FINDINGS:  There is mild bibasilar atelectasis..    The liver appears normal.  No liver mass or lesion is seen.  Portal and hepatic veins appear normal.    The gallbladder appears grossly unremarkable.    The pancreas appears normal.  No pancreatic mass or lesion is seen.    The spleen appears normal.  No splenic mass or lesion is seen.    The adrenal glands appear normal.  No adrenal nodule is seen.    The kidneys are normal in size.  No hydronephrosis is seen.  No hydroureter is seen.  No nephrolithiasis or ureteral stone is seen.  There is some punctate cysts seen in the kidneys bilaterally.  No focal mass is seen.    Urinary bladder appears normal.    No colitis is seen.  There are multiple diverticuli in the sigmoid colon but no diverticulitis is seen.  No colonic mass or lesion or inflammatory process is seen.  The appendix appears normal.    Atherosclerotic plaque is seen in the abdominal aorta and mesenteric vessels.    No free air is seen.  No free fluid is seen.    The bones appear grossly unremarkable.                                       X-Ray Chest AP Portable (Final result)  Result time 07/17/24 12:12:57      Final result by Beni Medeiros MD (07/17/24 12:12:57)                   Impression:      No acute chest disease is identified..    No significant abnormalities  identified      Electronically signed by: Beni Medeiros  Date:    07/17/2024  Time:    12:12               Narrative:    EXAMINATION:  XR CHEST AP PORTABLE    CLINICAL HISTORY:  cough;, .    FINDINGS:  No alveolar consolidation, effusion, or pneumothorax is seen.   The thoracic aorta is normal  cardiac silhouette is at the upper limits of normal, central pulmonary vessels and mediastinum are normal in size and are grossly unremarkable.   visualized osseous structures are grossly unremarkable.                                      Current Medications:     Infusions:   lactated ringers   Intravenous Continuous 50 mL/hr at 07/20/24 0610 Rate Verify at 07/20/24 0610        Scheduled:   amLODIPine  10 mg Oral Daily    atorvastatin  20 mg Oral Daily    ceFEPime IV (PEDS and ADULTS)  2 g Intravenous Q8H    heparin (porcine)  5,000 Units Subcutaneous Q8H    lisinopriL  40 mg Oral Daily    triamterene-hydrochlorothiazide 37.5-25 mg  1 tablet Oral Daily        PRN:    Current Facility-Administered Medications:     acetaminophen, 650 mg, Oral, Q6H PRN    dextrose 10%, 12.5 g, Intravenous, PRN    dextrose 10%, 25 g, Intravenous, PRN    glucagon (human recombinant), 1 mg, Intramuscular, PRN    glucose, 16 g, Oral, PRN    glucose, 24 g, Oral, PRN    naloxone, 0.02 mg, Intravenous, PRN    ondansetron, 4 mg, Intravenous, Q8H PRN    sodium chloride 0.9%, 10 mL, Intravenous, Q12H PRN    Assessment & Plan:     Leukocytosis  Bacteremia       - WBC 18.60 on admission, improved to 13.03       - CXR no signs of infectious process, UA unremarkable, no visible skin wounds       - Repeat CXR cardiomegaly and mild congestive changes       - Patient has hx of chronic kidney cysts with last imaging 3/2023. CT abdomen pelvis w wo contrast no acute process       - Blood cx 2/2 positive for pan sensitive pseudomonas, unclear source at this time       - ID on board, recommend d/c vanc, zosyn and transition to cefepime, plan to adjust pending  final sensitivities     DEVIN - resolved        - Cr 1.26, baseline <1        - Cr 0.94        - Patient reports decreased fluid intake        - Given patient's peripheral edema will continue with gentle fluid resuscitation     Peripheral edema       - Chronic, patient reports has at baseline at home       - Repeat echo 7/17 EF 69%, mild aortic regurgitation, PHTN with PA 37 mm Hg       - No symptoms of SOB, no crackles on PE, continue to monitor and will diurese as needed     HTN       - On triamterene-HCTZ 37.5-25 mg daily, benzapril 40 mg daily, and amlodipine 10 mg daily at home       - Resume home meds           CODE STATUS: Full  Access: PIV  Antibiotics: Vanc, zosyn  Diet: Cardiac  DVT Prophylaxis: Heparin  GI Prophylaxis: None  Fluids: None        Disposition: Bacteremia of unknown origin. ID on board. Continue cefepime pending final sensitivities.       Rm Portillo MD  [facility]

## 2024-07-21 LAB
ALBUMIN SERPL-MCNC: 2.8 G/DL (ref 3.4–4.8)
ALBUMIN/GLOB SERPL: 0.8 RATIO (ref 1.1–2)
ALP SERPL-CCNC: 209 UNIT/L (ref 40–150)
ALT SERPL-CCNC: 94 UNIT/L (ref 0–55)
ANION GAP SERPL CALC-SCNC: 6 MEQ/L
AST SERPL-CCNC: 120 UNIT/L (ref 5–34)
BASOPHILS # BLD AUTO: 0.06 X10(3)/MCL
BASOPHILS NFR BLD AUTO: 0.7 %
BILIRUB SERPL-MCNC: 0.8 MG/DL
BUN SERPL-MCNC: 17.4 MG/DL (ref 9.8–20.1)
CALCIUM SERPL-MCNC: 9 MG/DL (ref 8.4–10.2)
CHLORIDE SERPL-SCNC: 107 MMOL/L (ref 98–107)
CO2 SERPL-SCNC: 23 MMOL/L (ref 23–31)
CREAT SERPL-MCNC: 0.82 MG/DL (ref 0.55–1.02)
CREAT/UREA NIT SERPL: 21
EOSINOPHIL # BLD AUTO: 0.87 X10(3)/MCL (ref 0–0.9)
EOSINOPHIL NFR BLD AUTO: 9.8 %
ERYTHROCYTE [DISTWIDTH] IN BLOOD BY AUTOMATED COUNT: 14.4 % (ref 11.5–17)
GFR SERPLBLD CREATININE-BSD FMLA CKD-EPI: >60 ML/MIN/1.73/M2
GLOBULIN SER-MCNC: 3.7 GM/DL (ref 2.4–3.5)
GLUCOSE SERPL-MCNC: 112 MG/DL (ref 82–115)
HCT VFR BLD AUTO: 29.1 % (ref 37–47)
HGB BLD-MCNC: 9.7 G/DL (ref 12–16)
HOLD SPECIMEN: NORMAL
IMM GRANULOCYTES # BLD AUTO: 0.07 X10(3)/MCL (ref 0–0.04)
IMM GRANULOCYTES NFR BLD AUTO: 0.8 %
LYMPHOCYTES # BLD AUTO: 1.94 X10(3)/MCL (ref 0.6–4.6)
LYMPHOCYTES NFR BLD AUTO: 21.8 %
MCH RBC QN AUTO: 29.5 PG (ref 27–31)
MCHC RBC AUTO-ENTMCNC: 33.3 G/DL (ref 33–36)
MCV RBC AUTO: 88.4 FL (ref 80–94)
MONOCYTES # BLD AUTO: 0.95 X10(3)/MCL (ref 0.1–1.3)
MONOCYTES NFR BLD AUTO: 10.7 %
NEUTROPHILS # BLD AUTO: 4.99 X10(3)/MCL (ref 2.1–9.2)
NEUTROPHILS NFR BLD AUTO: 56.2 %
NRBC BLD AUTO-RTO: 0 %
PLATELET # BLD AUTO: 288 X10(3)/MCL (ref 130–400)
PMV BLD AUTO: 10.2 FL (ref 7.4–10.4)
POTASSIUM SERPL-SCNC: 4 MMOL/L (ref 3.5–5.1)
PROT SERPL-MCNC: 6.5 GM/DL (ref 5.8–7.6)
RBC # BLD AUTO: 3.29 X10(6)/MCL (ref 4.2–5.4)
SODIUM SERPL-SCNC: 136 MMOL/L (ref 136–145)
WBC # BLD AUTO: 8.88 X10(3)/MCL (ref 4.5–11.5)

## 2024-07-21 PROCEDURE — 21400001 HC TELEMETRY ROOM

## 2024-07-21 PROCEDURE — 36415 COLL VENOUS BLD VENIPUNCTURE: CPT

## 2024-07-21 PROCEDURE — 25000003 PHARM REV CODE 250: Performed by: STUDENT IN AN ORGANIZED HEALTH CARE EDUCATION/TRAINING PROGRAM

## 2024-07-21 PROCEDURE — 63600175 PHARM REV CODE 636 W HCPCS

## 2024-07-21 PROCEDURE — 11000001 HC ACUTE MED/SURG PRIVATE ROOM

## 2024-07-21 PROCEDURE — 25000003 PHARM REV CODE 250: Performed by: INTERNAL MEDICINE

## 2024-07-21 PROCEDURE — 25000003 PHARM REV CODE 250

## 2024-07-21 PROCEDURE — 36415 COLL VENOUS BLD VENIPUNCTURE: CPT | Performed by: INTERNAL MEDICINE

## 2024-07-21 PROCEDURE — 85025 COMPLETE CBC W/AUTO DIFF WBC: CPT

## 2024-07-21 PROCEDURE — 80053 COMPREHEN METABOLIC PANEL: CPT

## 2024-07-21 PROCEDURE — 63600175 PHARM REV CODE 636 W HCPCS: Performed by: STUDENT IN AN ORGANIZED HEALTH CARE EDUCATION/TRAINING PROGRAM

## 2024-07-21 RX ORDER — POLYETHYLENE GLYCOL 3350 17 G/17G
17 POWDER, FOR SOLUTION ORAL DAILY
Status: DISCONTINUED | OUTPATIENT
Start: 2024-07-21 | End: 2024-07-23 | Stop reason: HOSPADM

## 2024-07-21 RX ADMIN — SODIUM CHLORIDE, POTASSIUM CHLORIDE, SODIUM LACTATE AND CALCIUM CHLORIDE: 600; 310; 30; 20 INJECTION, SOLUTION INTRAVENOUS at 12:07

## 2024-07-21 RX ADMIN — COLCHICINE 0.6 MG: 0.6 TABLET, FILM COATED ORAL at 08:07

## 2024-07-21 RX ADMIN — HEPARIN SODIUM 5000 UNITS: 5000 INJECTION INTRAVENOUS; SUBCUTANEOUS at 02:07

## 2024-07-21 RX ADMIN — CEFEPIME 2 G: 2 INJECTION, POWDER, FOR SOLUTION INTRAVENOUS at 04:07

## 2024-07-21 RX ADMIN — ATORVASTATIN CALCIUM 20 MG: 20 TABLET, FILM COATED ORAL at 08:07

## 2024-07-21 RX ADMIN — CEFEPIME 2 G: 2 INJECTION, POWDER, FOR SOLUTION INTRAVENOUS at 01:07

## 2024-07-21 RX ADMIN — AMLODIPINE BESYLATE 10 MG: 10 TABLET ORAL at 08:07

## 2024-07-21 RX ADMIN — CEFEPIME 2 G: 2 INJECTION, POWDER, FOR SOLUTION INTRAVENOUS at 08:07

## 2024-07-21 RX ADMIN — POLYETHYLENE GLYCOL 3350 17 G: 17 POWDER, FOR SOLUTION ORAL at 02:07

## 2024-07-21 RX ADMIN — LISINOPRIL 40 MG: 10 TABLET ORAL at 08:07

## 2024-07-21 RX ADMIN — TRIAMTERENE AND HYDROCHLOROTHIAZIDE 1 TABLET: 37.5; 25 CAPSULE ORAL at 09:07

## 2024-07-21 RX ADMIN — ALLOPURINOL 200 MG: 100 TABLET ORAL at 08:07

## 2024-07-21 RX ADMIN — HEPARIN SODIUM 5000 UNITS: 5000 INJECTION INTRAVENOUS; SUBCUTANEOUS at 09:07

## 2024-07-21 RX ADMIN — HEPARIN SODIUM 5000 UNITS: 5000 INJECTION INTRAVENOUS; SUBCUTANEOUS at 05:07

## 2024-07-22 ENCOUNTER — TELEPHONE (OUTPATIENT)
Dept: INFECTIOUS DISEASES | Facility: HOSPITAL | Age: 89
End: 2024-07-22
Payer: MEDICARE

## 2024-07-22 LAB
ALBUMIN SERPL-MCNC: 2.7 G/DL (ref 3.4–4.8)
ALBUMIN/GLOB SERPL: 0.7 RATIO (ref 1.1–2)
ALP SERPL-CCNC: 257 UNIT/L (ref 40–150)
ALT SERPL-CCNC: 113 UNIT/L (ref 0–55)
ANION GAP SERPL CALC-SCNC: 7 MEQ/L
AST SERPL-CCNC: 108 UNIT/L (ref 5–34)
BACTERIA BLD CULT: ABNORMAL
BACTERIA BLD CULT: ABNORMAL
BASOPHILS # BLD AUTO: 0.05 X10(3)/MCL
BASOPHILS NFR BLD AUTO: 0.6 %
BILIRUB SERPL-MCNC: 0.8 MG/DL
BUN SERPL-MCNC: 17.8 MG/DL (ref 9.8–20.1)
CALCIUM SERPL-MCNC: 9 MG/DL (ref 8.4–10.2)
CHLORIDE SERPL-SCNC: 108 MMOL/L (ref 98–107)
CO2 SERPL-SCNC: 24 MMOL/L (ref 23–31)
CREAT SERPL-MCNC: 0.81 MG/DL (ref 0.55–1.02)
CREAT/UREA NIT SERPL: 22
CRP SERPL-MCNC: 50.6 MG/L
EOSINOPHIL # BLD AUTO: 0.9 X10(3)/MCL (ref 0–0.9)
EOSINOPHIL NFR BLD AUTO: 10.1 %
ERYTHROCYTE [DISTWIDTH] IN BLOOD BY AUTOMATED COUNT: 14.6 % (ref 11.5–17)
ERYTHROCYTE [SEDIMENTATION RATE] IN BLOOD: 78 MM/HR (ref 0–20)
GFR SERPLBLD CREATININE-BSD FMLA CKD-EPI: >60 ML/MIN/1.73/M2
GLOBULIN SER-MCNC: 3.7 GM/DL (ref 2.4–3.5)
GLUCOSE SERPL-MCNC: 98 MG/DL (ref 82–115)
GRAM STN SPEC: ABNORMAL
HCT VFR BLD AUTO: 28.9 % (ref 37–47)
HGB BLD-MCNC: 9.3 G/DL (ref 12–16)
IMM GRANULOCYTES # BLD AUTO: 0.1 X10(3)/MCL (ref 0–0.04)
IMM GRANULOCYTES NFR BLD AUTO: 1.1 %
LYMPHOCYTES # BLD AUTO: 2.67 X10(3)/MCL (ref 0.6–4.6)
LYMPHOCYTES NFR BLD AUTO: 30 %
MCH RBC QN AUTO: 29 PG (ref 27–31)
MCHC RBC AUTO-ENTMCNC: 32.2 G/DL (ref 33–36)
MCV RBC AUTO: 90 FL (ref 80–94)
MONOCYTES # BLD AUTO: 1.13 X10(3)/MCL (ref 0.1–1.3)
MONOCYTES NFR BLD AUTO: 12.7 %
NEUTROPHILS # BLD AUTO: 4.06 X10(3)/MCL (ref 2.1–9.2)
NEUTROPHILS NFR BLD AUTO: 45.5 %
NRBC BLD AUTO-RTO: 0 %
PLATELET # BLD AUTO: 301 X10(3)/MCL (ref 130–400)
PMV BLD AUTO: 10.2 FL (ref 7.4–10.4)
POTASSIUM SERPL-SCNC: 4.2 MMOL/L (ref 3.5–5.1)
PROT SERPL-MCNC: 6.4 GM/DL (ref 5.8–7.6)
RBC # BLD AUTO: 3.21 X10(6)/MCL (ref 4.2–5.4)
SODIUM SERPL-SCNC: 139 MMOL/L (ref 136–145)
STRONGYLOIDES IGG SER QL IA: NEGATIVE
WBC # BLD AUTO: 8.91 X10(3)/MCL (ref 4.5–11.5)

## 2024-07-22 PROCEDURE — 76937 US GUIDE VASCULAR ACCESS: CPT

## 2024-07-22 PROCEDURE — 36410 VNPNXR 3YR/> PHY/QHP DX/THER: CPT

## 2024-07-22 PROCEDURE — 25000003 PHARM REV CODE 250: Performed by: STUDENT IN AN ORGANIZED HEALTH CARE EDUCATION/TRAINING PROGRAM

## 2024-07-22 PROCEDURE — 25000003 PHARM REV CODE 250: Performed by: INTERNAL MEDICINE

## 2024-07-22 PROCEDURE — 86140 C-REACTIVE PROTEIN: CPT | Performed by: NURSE PRACTITIONER

## 2024-07-22 PROCEDURE — 36415 COLL VENOUS BLD VENIPUNCTURE: CPT

## 2024-07-22 PROCEDURE — A4216 STERILE WATER/SALINE, 10 ML: HCPCS | Performed by: INTERNAL MEDICINE

## 2024-07-22 PROCEDURE — 97530 THERAPEUTIC ACTIVITIES: CPT

## 2024-07-22 PROCEDURE — 80053 COMPREHEN METABOLIC PANEL: CPT

## 2024-07-22 PROCEDURE — 11000001 HC ACUTE MED/SURG PRIVATE ROOM

## 2024-07-22 PROCEDURE — C1751 CATH, INF, PER/CENT/MIDLINE: HCPCS

## 2024-07-22 PROCEDURE — 21400001 HC TELEMETRY ROOM

## 2024-07-22 PROCEDURE — 85025 COMPLETE CBC W/AUTO DIFF WBC: CPT

## 2024-07-22 PROCEDURE — 63600175 PHARM REV CODE 636 W HCPCS: Performed by: STUDENT IN AN ORGANIZED HEALTH CARE EDUCATION/TRAINING PROGRAM

## 2024-07-22 PROCEDURE — 63600175 PHARM REV CODE 636 W HCPCS

## 2024-07-22 PROCEDURE — 97116 GAIT TRAINING THERAPY: CPT

## 2024-07-22 PROCEDURE — 25000003 PHARM REV CODE 250

## 2024-07-22 PROCEDURE — 85652 RBC SED RATE AUTOMATED: CPT | Performed by: NURSE PRACTITIONER

## 2024-07-22 RX ORDER — SODIUM CHLORIDE 0.9 % (FLUSH) 0.9 %
10 SYRINGE (ML) INJECTION
Status: DISCONTINUED | OUTPATIENT
Start: 2024-07-22 | End: 2024-07-23 | Stop reason: HOSPADM

## 2024-07-22 RX ORDER — COLCHICINE 0.6 MG/1
0.6 TABLET ORAL DAILY PRN
Qty: 10 TABLET | Refills: 0 | Status: SHIPPED | OUTPATIENT
Start: 2024-07-22 | End: 2025-07-22

## 2024-07-22 RX ORDER — SODIUM CHLORIDE 0.9 % (FLUSH) 0.9 %
10 SYRINGE (ML) INJECTION EVERY 6 HOURS
Status: DISCONTINUED | OUTPATIENT
Start: 2024-07-22 | End: 2024-07-23 | Stop reason: HOSPADM

## 2024-07-22 RX ADMIN — ATORVASTATIN CALCIUM 20 MG: 20 TABLET, FILM COATED ORAL at 08:07

## 2024-07-22 RX ADMIN — POLYETHYLENE GLYCOL 3350 17 G: 17 POWDER, FOR SOLUTION ORAL at 08:07

## 2024-07-22 RX ADMIN — TRIAMTERENE AND HYDROCHLOROTHIAZIDE 1 TABLET: 37.5; 25 CAPSULE ORAL at 08:07

## 2024-07-22 RX ADMIN — CEFEPIME 2 G: 2 INJECTION, POWDER, FOR SOLUTION INTRAVENOUS at 12:07

## 2024-07-22 RX ADMIN — HEPARIN SODIUM 5000 UNITS: 5000 INJECTION INTRAVENOUS; SUBCUTANEOUS at 06:07

## 2024-07-22 RX ADMIN — ALLOPURINOL 200 MG: 100 TABLET ORAL at 08:07

## 2024-07-22 RX ADMIN — COLCHICINE 0.6 MG: 0.6 TABLET, FILM COATED ORAL at 08:07

## 2024-07-22 RX ADMIN — CEFEPIME 2 G: 2 INJECTION, POWDER, FOR SOLUTION INTRAVENOUS at 06:07

## 2024-07-22 RX ADMIN — SODIUM CHLORIDE, POTASSIUM CHLORIDE, SODIUM LACTATE AND CALCIUM CHLORIDE: 600; 310; 30; 20 INJECTION, SOLUTION INTRAVENOUS at 06:07

## 2024-07-22 RX ADMIN — HEPARIN SODIUM 5000 UNITS: 5000 INJECTION INTRAVENOUS; SUBCUTANEOUS at 09:07

## 2024-07-22 RX ADMIN — LISINOPRIL 40 MG: 10 TABLET ORAL at 08:07

## 2024-07-22 RX ADMIN — AMLODIPINE BESYLATE 10 MG: 10 TABLET ORAL at 08:07

## 2024-07-22 RX ADMIN — CEFEPIME 2 G: 2 INJECTION, POWDER, FOR SOLUTION INTRAVENOUS at 08:07

## 2024-07-22 RX ADMIN — HEPARIN SODIUM 5000 UNITS: 5000 INJECTION INTRAVENOUS; SUBCUTANEOUS at 02:07

## 2024-07-22 RX ADMIN — Medication 10 ML: at 06:07

## 2024-07-22 NOTE — CARE UPDATE
[facility]  Infectious Diseases OPAT Orders  2024     PATIENT: Leda Gallardo  :          1935   MRN:         [id number]     DIAGNOSIS:  Polymicrobial bacteremia (Pseudomonas aeruginosa and Providencia stuartii)    Review of patient's allergies indicates:  No Known Allergies     WEIGHT: 33.8 kg  CURRENT: Estimated Creatinine Clearance: 53 mL/min (based on SCr of 0.81 mg/dL).     MEDICATIONS:   1) Cefepime 2 g IV q 8 h  - Duration: 11 days to complete a 14 day course         **PLEASE FAX LAB RESULTS TO [phone]**  LABS:     Please check the following labs weekly x 2 weeks: CBC, CMP, CRP, and ESR      ** LABS are NOT to be drawn from PICC site**      NURSING / OTHER:     [] Please place PICC line  [x] Routine Midline care with weekly PICC line dressing changes  [x] Patient to receive first dose of IV antimicrobials in a supervised setting  [x] Please provide home IV antimicrobial teaching  [x] OK to pull Midline after completion of IV antimicrobial therapy    ADDITIONAL NOTES:       Jayshree Paz FNP / Dr Rob Mohan  [facility] [place] [facility]

## 2024-07-22 NOTE — PROGRESS NOTES
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 VSS)  . Patient reports that last night she began to experience episodes of chills. This morning when patient woke up was also having severe, diffuse weakness that made her concerned she would fall. Denies any headaches, vision changes, cold/congestion, sore throat, CP, SOB, abdominal pain, or urinary symptoms. No vomiting but does reports some nausea overnight. Also having mild, dry cough. No diarrhea. Patient does report intermittent pain in feet 2/2 gout, but is currently asymptomatic. States lives at home with family and usually able to ambulate on her own without any assistive devices.      In the ED, the patient was afebrile. HR 83, RR 18, /60. O2 98% on RA. CBC remarkable for leukocytosis of 18.6, H/H 10.0/31.6. BUN/Cr 33.3/1.26 (baseline Cr <1). Trop 0.565, LA 2.0, TSH wnl. COVID/FLU/RSV neg. UA trace protein and 1+ blood. CXR no acute process. EKG NSR. Blood cx pending. Patient was started on vanc and zosyn. Patient was admitted for leukocytosis of unknown origin.    Interval History: NAEON. Patient is doing well. Still having acute pain in L foot, states feels similar to previous gout flares.      Review of Systems:  ROS completed and negative except as indicated above.     Objective:     Last 24 Hour Vital Signs:  BP  Min: 135/63  Max: 148/70  Temp  Av.5 °F (36.9 °C)  Min: 98.1 °F (36.7 °C)  Max: 99 °F (37.2 °C)  Pulse  Av.3  Min: 55  Max: 65  Resp  Av  Min: 18  Max: 18  SpO2  Av.2  %  Min: 95 %  Max: 98 %  I/O last 3 completed shifts:  In: 2766 [P.O.:590; I.V.:1781.6; IV Piggyback:394.4]  Out: 5425 [Urine:5425]    Physical Exam  Vitals and nursing note reviewed.   Constitutional:       General: She is not in acute distress.     Appearance: She is not ill-appearing, toxic-appearing or diaphoretic.   Eyes:      Conjunctiva/sclera: Conjunctivae normal.   Cardiovascular:      Rate and Rhythm: Normal rate and regular rhythm.      Heart sounds: Normal heart sounds. Heart sounds not distant. No murmur heard.     No friction rub. No gallop.   Pulmonary:      Effort: Pulmonary effort is normal. No respiratory distress.      Breath sounds: Normal breath sounds and air entry. No decreased breath sounds or wheezing.   Abdominal:      General: Abdomen is flat. Bowel sounds are normal. There is no distension.      Palpations: Abdomen is soft.      Tenderness: There is no abdominal tenderness. There is no guarding.   Musculoskeletal:      Cervical back: Full passive range of motion without pain and normal range of motion.      Right lower leg: No edema.      Left lower leg: No edema.      Right ankle: Swelling (Mild) present.      Left ankle: Swelling and deformity present. No ecchymosis. Tenderness (Diffuse) present. Normal range of motion.   Skin:     General: Skin is warm.      Capillary Refill: Capillary refill takes less than 2 seconds.   Neurological:      Mental Status: She is alert.       Laboratory:  Most Recent Data:  CBC:   Lab Results   Component Value Date    WBC 8.91 07/22/2024    HGB 9.3 (L) 07/22/2024    HCT 28.9 (L) 07/22/2024     07/22/2024    MCV 90.0 07/22/2024    RDW 14.6 07/22/2024     WBC Differential:   Recent Labs   Lab 07/18/24  0336 07/19/24  0515 07/20/24  0452 07/21/24  0357 07/22/24  0344   WBC 13.03* 9.75 8.96 8.88 8.91   HGB 9.4* 10.2* 9.6* 9.7* 9.3*   HCT 29.4* 30.4* 30.1* 29.1* 28.9*    253 266 288 301   MCV 91.0 88.9 89.9 88.4 90.0     BMP:   Lab Results  "  Component Value Date     07/22/2024    K 4.2 07/22/2024     (H) 07/22/2024    CO2 24 07/22/2024    BUN 17.8 07/22/2024    CREATININE 0.81 07/22/2024    CALCIUM 9.0 07/22/2024    MG 2.30 07/17/2024     LFTs:   Lab Results   Component Value Date    ALBUMIN 2.7 (L) 07/22/2024    BILITOT 0.8 07/22/2024     (H) 07/22/2024    ALKPHOS 257 (H) 07/22/2024     (H) 07/22/2024     Coags: No results found for: "INR", "PROTIME", "PTT"  FLP:   Lab Results   Component Value Date    CHOL 173 07/15/2024    HDL 45 07/15/2024    TRIG 100 07/15/2024     DM:   Lab Results   Component Value Date    CREATININE 0.81 07/22/2024     Thyroid:   Lab Results   Component Value Date    TSH 0.936 07/17/2024      Anemia: No results found for: "IRON", "TIBC", "FERRITIN", "SATURATEDIRO"  No results found for: "PJHZGLNL08"  No results found for: "FOLATE"     Cardiac:   Lab Results   Component Value Date    TROPONINI 0.582 (H) 07/17/2024    .5 (H) 07/17/2024         Microbiology Data:  Microbiology Results (last 7 days)       Procedure Component Value Units Date/Time    Blood Culture [[id number]]  (Normal) Collected: 07/20/24 0452    Order Status: Completed Specimen: Blood from Arm, Left Updated: 07/22/24 1101     Blood Culture No Growth At 48 Hours    Blood Culture [[id number]]  (Normal) Collected: 07/20/24 0457    Order Status: Completed Specimen: Blood from Hand, Left Updated: 07/22/24 1101     Blood Culture No Growth At 48 Hours    Blood culture #1 **CANNOT BE ORDERED STAT** [[id number]]  (Abnormal)  (Susceptibility) Collected: 07/17/24 1232    Order Status: Completed Specimen: Blood from Arm, Right Updated: 07/22/24 0640     Blood Culture Providencia stuartii      Pseudomonas aeruginosa     GRAM STAIN Gram Negative Rods      Seen in gram stain of broth only      1 of 2 Aerobic bottles positive    Blood culture #2 **CANNOT BE ORDERED STAT** [[id number]]  (Abnormal)  (Susceptibility) Collected: 07/17/24 1232    " Order Status: Completed Specimen: Blood from Hand, Right Updated: 07/20/24 0726     Blood Culture Pseudomonas aeruginosa     GRAM STAIN Gram Negative Rods      Seen in gram stain of broth only      1 of 1 Aerobic bottle positive    Chlamydia/GC, PCR [[id number]] Collected: 07/20/24 0038    Order Status: Completed Specimen: Urine Updated: 07/20/24 0227     Chlamydia trachomatis PCR Not Detected     N. gonorrhea PCR Not Detected     Source Urine    Narrative:      The Xpert CT/NG test, performed on the GeneXpert system is a qualitative in vitro real-time polymerase chain reaction (PCR) test for the automated detected and differentiation for genomic DNA from Chlamydia trachomatis (CT) and/or Neisseria gonorrhoeae (NG).    BCID2 Panel [[id number]]  (Abnormal) Collected: 07/17/24 1232    Order Status: Completed Specimen: Blood from Arm, Right Updated: 07/18/24 1214     CTX-M (ESBL ) Not Detected     IMP (Cabapenemase ) Not Detected     KPC resistance gene (Carbapenemase ) Not Detected     mcr-1 N/A     mecA ID N/A     Comment: Note: Antimicrobial resistance can occur via multiple mechanisms. A Not Detected result for antimicrobial resistance gene(s) does not indicate antimicrobial susceptibility. Subculturing is required for species identification and susceptibility testing of   isolates.        mecA/C and MREJ (MRSA) gene N/A     NDM (Carbapenemase ) Not Detected     OXA-48-like (Carbapenemase ) Not Detected     Anthony/B (VRE gene) N/A     VIM (Carbapenemase ) Not Detected     Enterococcus faecalis Not Detected     Enterococcus faecium Not Detected     Listeria monocytogenes Not Detected     Staphylococcus spp. Not Detected     Staphylococcus aureus Not Detected     Staphylococcus epidermidis Not Detected     Staphylococcus lugdunensis Not Detected     Streptococcus spp. Not Detected     Streptococcus agalactiae (Group B) Not Detected     Streptococcus pneumoniae Not  Detected     Streptococcus pyogenes (Group A) Not Detected     Acinetobacter calcoaceticus/baumannii complex Not Detected     Bacteroides fragilis Not Detected     Enterobacterales Detected     Enterobacter cloacae complex Not Detected     Escherichia coli Not Detected     Klebsiella aerogenes Not Detected     Klebsiella oxytoca Not Detected     Klebsiella pneumoniae group Not Detected     Proteus spp. Not Detected     Salmonella spp. Not Detected     Serratia marcescens Not Detected     Haemophilus influenzae Not Detected     Neisseria meningitidis Not Detected     Pseudomonas aeruginosa Detected     Stenotrophomonas maltophilia Not Detected     Candida albicans Not Detected     Candida auris Not Detected     Candida glabrata Not Detected     Candida krusei Not Detected     Candida parapsilosis Not Detected     Candida tropicalis Not Detected     Cryptococcus neoformans/gattii Not Detected    Narrative:      The [employer] Micropower BCID2 Panel is a multiplexed nucleic acid test intended for the use with [employer] or [employer]® [employer] for the simultaneous qualitative detection and identification of multiple bacterial and yeast nucleic acids and select genetic determinants associated with antimicrobial resistance.  The BioFire BCID2 Panel test is performed directly on blood culture samples identified as positive by a continuous monitoring blood culture system.  Results are intended to be interpreted in conjunction with Gram stain results.             Other Results:    Radiology:  Imaging Results              X-Ray Chest 1 View (Final result)  Result time 07/18/24 11:15:43      Final result by Miles Branham MD (07/18/24 11:15:43)                   Impression:      Cardiomegaly and mild congestive changes.      Electronically signed by: Miles Branham  Date:    07/18/2024  Time:    11:15               Narrative:    EXAMINATION:  XR CHEST 1 VIEW    CLINICAL HISTORY:  cough;    TECHNIQUE:  One  view    COMPARISON:  July 17, 2024.    FINDINGS:  Cardiopericardial silhouette enlarged appearance similar.  Lungs interstitial markings prominence suggest mild congestive process.  There is no focally dense consolidation or significant fluid within the pleural spaces.  No pneumothorax.                                       CT Abdomen Pelvis W Wo Contrast (Final result)  Result time 07/17/24 16:26:13      Final result by Jenniffer Cottrell MD (07/17/24 16:26:13)                   Impression:      No acute process      Electronically signed by: Emiliano Cottrell  Date:    07/17/2024  Time:    16:26               Narrative:    EXAMINATION:  CT ABDOMEN PELVIS W WO CONTRAST    CLINICAL HISTORY:  Abdominal abscess/infection suspected;    TECHNIQUE:  Low dose axial images, sagittal and coronal reformations were obtained from the lung bases to the pubic symphysis following the IV administration of  contrast.  Pre contrasted imaging was performed as well. Automatic exposure control is utilized to reduce patient radiation exposure.    COMPARISON:  03/20/2023    FINDINGS:  There is mild bibasilar atelectasis..    The liver appears normal.  No liver mass or lesion is seen.  Portal and hepatic veins appear normal.    The gallbladder appears grossly unremarkable.    The pancreas appears normal.  No pancreatic mass or lesion is seen.    The spleen appears normal.  No splenic mass or lesion is seen.    The adrenal glands appear normal.  No adrenal nodule is seen.    The kidneys are normal in size.  No hydronephrosis is seen.  No hydroureter is seen.  No nephrolithiasis or ureteral stone is seen.  There is some punctate cysts seen in the kidneys bilaterally.  No focal mass is seen.    Urinary bladder appears normal.    No colitis is seen.  There are multiple diverticuli in the sigmoid colon but no diverticulitis is seen.  No colonic mass or lesion or inflammatory process is seen.  The appendix appears normal.    Atherosclerotic  plaque is seen in the abdominal aorta and mesenteric vessels.    No free air is seen.  No free fluid is seen.    The bones appear grossly unremarkable.                                       X-Ray Chest AP Portable (Final result)  Result time 07/17/24 12:12:57      Final result by Beni Medeiros MD (07/17/24 12:12:57)                   Impression:      No acute chest disease is identified..    No significant abnormalities identified      Electronically signed by: Beni Medeiros  Date:    07/17/2024  Time:    12:12               Narrative:    EXAMINATION:  XR CHEST AP PORTABLE    CLINICAL HISTORY:  cough;, .    FINDINGS:  No alveolar consolidation, effusion, or pneumothorax is seen.   The thoracic aorta is normal  cardiac silhouette is at the upper limits of normal, central pulmonary vessels and mediastinum are normal in size and are grossly unremarkable.   visualized osseous structures are grossly unremarkable.                                      Current Medications:     Infusions:   lactated ringers   Intravenous Continuous 50 mL/hr at 07/22/24 0631 New Bag at 07/22/24 0631        Scheduled:   allopurinoL  200 mg Oral Daily    amLODIPine  10 mg Oral Daily    atorvastatin  20 mg Oral Daily    ceFEPime IV (PEDS and ADULTS)  2 g Intravenous Q8H    colchicine  0.6 mg Oral Daily    heparin (porcine)  5,000 Units Subcutaneous Q8H    lisinopriL  40 mg Oral Daily    polyethylene glycol  17 g Oral Daily    triamterene-hydrochlorothiazide 37.5-25 mg  1 tablet Oral Daily        PRN:    Current Facility-Administered Medications:     acetaminophen, 650 mg, Oral, Q6H PRN    dextrose 10%, 12.5 g, Intravenous, PRN    dextrose 10%, 25 g, Intravenous, PRN    glucagon (human recombinant), 1 mg, Intramuscular, PRN    glucose, 16 g, Oral, PRN    glucose, 24 g, Oral, PRN    naloxone, 0.02 mg, Intravenous, PRN    ondansetron, 4 mg, Intravenous, Q8H PRN    sodium chloride 0.9%, 10 mL, Intravenous, Q12H PRN      Assessment & Plan:      Leukocytosis  Bacteremia       - WBC 18.60 on admission, continues to improve, 8.88 this AM       - CXR no signs of infectious process, UA unremarkable, no visible skin wounds       - Repeat CXR cardiomegaly and mild congestive changes       - Patient has hx of chronic kidney cysts with last imaging 3/2023. CT abdomen pelvis w wo contrast no acute process       - Blood cx 2/2 positive for pan sensitive pseudomonas, unclear source at this time       - Repeat blood cx on 7/20 NG @ 48 hrs       - ID on board, transition to cefepime and will continue OPAT with end date 8/3/2024     Transaminitis  - Patient's LFT continue to rise,  from initial 64, AST//113 from initial 17/11  - Patient with no complaint of abdominal pain or discomfort  - Will continue to monitor, likely response to starting allopurinol for acute gout flare     DEVIN - resolved        - Cr 1.26, baseline <1        - Cr 0.82 this AM        - Patient reports decreased fluid intake        - Given patient's peripheral edema will continue with gentle fluid resuscitation     Peripheral edema       - Chronic, patient reports has at baseline at home       - Repeat echo 7/17 EF 69%, mild aortic regurgitation, PHTN with PA 37 mm Hg       - No symptoms of SOB, no crackles on PE, continue to monitor and will diurese as needed     HTN       - On triamterene-HCTZ 37.5-25 mg daily, benzapril 40 mg daily, and amlodipine 10 mg daily at home       - Resume home meds    Gout flare  - Patient reporting pain in L ankle/great toe  - Started on allopurinol and colchicine  - Continue to monitor     CODE STATUS: Full  Access: PIV  Antibiotics: Cefepime  Diet: Cardiac  DVT Prophylaxis: Heparin  GI Prophylaxis: None  Fluids: None    Disposition: Bacteremia of unknown origin. ID on board. Continue cefepime, pending OPAT.        Rm Portillo MD  \[facility]\"" Family Medicine HO-II

## 2024-07-22 NOTE — PROCEDURES
"Leda Gallardo is a 89 y.o. female patient.    Temp: 98.4 °F (36.9 °C) (07/22/24 1523)  Pulse: 62 (07/22/24 1529)  Resp: 19 (07/22/24 1523)  BP: 128/64 (07/22/24 1529)  SpO2: 96 % (07/22/24 1523)  Weight: 89.4 kg (197 lb) (07/17/24 1200)  Height: 5' 4" (162.6 cm) (07/17/24 1200)    PICC  Time out: Immediately prior to procedure a time out was called to verify the correct patient, procedure, equipment, support staff and site/side marked as required  Indications: med administration  Preparation: skin prepped with ChloraPrep  Skin prep agent dried: skin prep agent completely dried prior to procedure  Sterile barriers: all five maximum sterile barriers used - cap, mask, sterile gown, sterile gloves, and large sterile sheet  Hand hygiene: hand hygiene performed prior to central venous catheter insertion  Location details: right basilic  Catheter type: single lumen  Catheter size: 4 Fr  Catheter Length: 15cm    Ultrasound guidance: yes  Needle advanced into vessel with real time Ultrasound guidance.  Guidewire confirmed in vessel.  Sterile sheath used.            Name zay  7/22/2024    "

## 2024-07-22 NOTE — TELEPHONE ENCOUNTER
Patient will be a home OPAT candidate.  She will need a blue chart.  No ID post wood appointment is needed  Thank you

## 2024-07-22 NOTE — PT/OT/SLP PROGRESS
Occupational Therapy   Treatment & Discharge Note    Name: Leda Gallardo  MRN: [id number]  Admitting Diagnosis:   Patient Active Problem List   Diagnosis    Hyperlipidemia    Hypertension    Obesity    Renal mass    Vitamin D deficiency     Recommendations:     Discharge Recommendations: Low Intensity Therapy  Discharge Equipment Recommendations:  other (see comments) (pt. family reports that they obtained a shower chair and only need a rolling walker)  Barriers to discharge:  None    Assessment:     Leda Gallardo is a 89 y.o. female with a medical diagnosis of   Patient Active Problem List   Diagnosis    Hyperlipidemia    Hypertension    Obesity    Renal mass    Vitamin D deficiency    She presents with functional decline and impaired occupational performance. Performance deficits affecting function are weakness, pain, gait instability, impaired balance.     Rehab Prognosis:  Good; patient would benefit from acute skilled OT services to address these deficits and reach maximum level of function.       Plan:     Patient to be seen 3 x/week to address the above listed problems via self-care/home management, therapeutic activities, therapeutic exercises  Plan of Care Expires:  (d/c)  Plan of Care Reviewed with: patient, family    Subjective     Chief Complaint: pain in foot from gout flare up  Patient/Family Comments/goals: return home to [facility]  Pain/Comfort:  Pain Rating 1: 5/10  Location - Side 1: Left  Location 1: foot  Pain Addressed 1: Nurse notified  Pain Rating Post-Intervention 1: 5/10    Objective:     Communicated with: nurse prior to session.  Patient found HOB elevated with peripheral IV, PureWick upon OT entry to room.    General Precautions: Standard, fall    Orthopedic Precautions:N/A  Braces: N/A  Respiratory Status: Room air     Occupational Performance:     Bed Mobility:    Patient completed Rolling/Turning to Left with  modified independence  Patient completed Scooting/Bridging with  independence  Patient completed Supine to Sit with modified independence  Patient completed Sit to Supine with modified independence     Functional Mobility/Transfers:  Patient completed Sit <> Stand Transfer with minimum assistance  with  rolling walker   Functional Mobility: pt. Ambulated from bed to hallway with assistance for management of lines and leads (please defer to PT note for specific distance)    Activities of Daily Living:  Patient refused ADLs this session/date due to reporting getting up with family to toilet transfer and toilet as well as to dress lower body.    Treatment & Education:  Pt. educated on OT goals, POC, orientation to environment, use of call bell for assist with transfers OOB or for any other needs due to fall risk.    Patient left up in chair with all lines intact, call button in reach, nurse notified, and family present    GOALS:   Multidisciplinary Problems       Occupational Therapy Goals       Not on file              Multidisciplinary Problems (Resolved)          Problem: Occupational Therapy    Goal Priority Disciplines Outcome Interventions   Occupational Therapy Goal   (Resolved)     OT, PT/OT Met    Description: Perform lower body dressing with Mod I.  Perform toilet transfer with Mod I using RW and grab bars.  Perform grooming at sink in standing with mod I and use of RW.  Perform toilet routine/hygiene with mod I using RW.                       Time Tracking:     OT Date of Treatment: 07/22/24  OT Start Time: 1150  OT Stop Time: 1207  OT Total Time (min): 17 min    Billable Minutes:Therapeutic Activity 17    OT/YANETH: OT          7/22/2024

## 2024-07-22 NOTE — PT/OT/SLP PROGRESS
Physical Therapy Treatment    Patient Name:  Leda Gallardo   MRN:  [id number]    Recommendations     Therapy Intensity Recommendations at Discharge: Low Intensity Therapy  Discharge Equipment Recommendations: grab bar, shower chair (TBD - rolling walker vs straight cane)   Barriers to discharge: none    Assessment     Leda Gallardo is a 89 y.o. female admitted with a medical diagnosis of   1. Sepsis, due to unspecified organism, unspecified whether acute organ dysfunction present    2. Weakness    3. Fatigue    4. Elevated troponin I level    5. Chest pain    6. Lower extremity edema       <principal problem not specified>.    She presents with the following impairments/functional limitations:  weakness, impaired self care skills, impaired functional mobility, gait instability, pain, impaired balance.    Rehab Prognosis: Good.    Patient would benefit from continued skilled acute PT services to: address above listed impairments/functional limitations; receive patient/caregiver education; reduce fall risk; and maximize independency/safety with functional mobility.    Recent Surgery: * No surgery found *      Plan     During this hospitalization, patient to be seen 5 x/week to address the identified impairments/functional limitations via gait training, therapeutic activities, therapeutic exercises and progress toward the established goals.    Plan of Care Expires:  08/16/24    Subjective     Communicated with patient's nurse Kamar prior to session.    Patient agreeable to participate in treatment session.    Chief Complaint: R foot pain  Patient/Family Comments/goals: return home  Pain/Comfort:  Pain Rating 1: 5/10  Location - Side 1: Right  Location 1: foot  Pain Rating Post-Intervention 1: 5/10  Pain Addressed 2: Cessation of Activity, Nurse notified    Objective     Patient found supine in bed with    upon PT entry to room.    General Precautions: Standard, fall   Orthopedic Precautions:N/A   Braces:     Respiratory Status: room air    Functional Mobility:    Bed Mobility:  Supine to Sit: modified independence    Transfers:  Sit to Stand: minimum assistance with rolling walker    Gait:  Patient ambulated 40 ft with rolling walker and minimum assistance.  Patient demonstrates :       no loss of balance       decreased devin       decreased step length       increased time in stance phase on unaffected leg.    Other Mobility:  not assessed    Patient left sitting in chair with all lines intact, call button in reach, patient's nurse notified, and family present.    Goals     Multidisciplinary Problems       Physical Therapy Goals          Problem: Physical Therapy    Goal Priority Disciplines Outcome Goal Variances Interventions   Physical Therapy Goal     PT, PT/OT Progressing     Description: ESTABLISHED 07/19/2024  Goals to be met by: DISCHARGE  Patient will increase functional independence with mobility by performing:  -. Supine to sit with Modified [place] met 7/22/2024  -. Sit to supine with Modified [place]  -. Rolling to Left and Right with Modified [place] met 7/22/2024  -. Sit to stand transfer with Supervision  -. Gait  x 130 feet with Supervision using Rolling Walker  Reviewed 7/22/2024                       Time Tracking     PT Received On: 07/22/24  PT Start Time: 1155     PT Stop Time: 1208  PT Total Time (min): 13 min     Billable Minutes: Gait Training 13    07/22/2024

## 2024-07-22 NOTE — CONSULTS
Spoke with pt & family (Daughter Marcelina Gallardo [phone]), ok with home health & IV infusions, no preferences indicated. Referrals submitted to [facility] for home health services & [facility] for home infusions via [facility].

## 2024-07-22 NOTE — PROGRESS NOTES
[facility]  Infectious Diseases Progress Note        SUBJECTIVE:   HPI: Ms. Leda Gallardo is an 89 y/o female with PMHx of HTN, HLD, vitamin-D deficiency, left renal cyst (s/p no intervention) and gout who presented to [facility] ED on (07/17/2024) with primary complaint of x1 day chills, shortness, weakness, and nausea.  She reports being in her usual state of health prior to having the symptoms occurred suddenly.  Denied any associated lightheadedness/dizziness, headaches, acute changes in vision, sore throat, sinus congestion, chest pain, palpitations, vomiting, or diarrhea.  On admission, patient sirs (2/4, leukocytosis of 18 & febrile to 101.2F) with vital signs within normal limits.  She was initiated on broad-spectrum antibiotics with vancomycin/Zosyn.  Vancomycin has been continued since admission however unfortunately Zosyn was only given a 1 time dose in ED. imaging performed on admission included CXR and CT abdomen/pelvis with/without contrast both unremarkable for acute processes other than highlighting previously known left renal cyst without concern for concurrent abscess.     Since admission, leukocytosis has resolved (18--> 13--> 9) in addition to patient no longer febrile since 7/18/24.  Despite improvement, blood cultures, 2/2 bottles, have resulted positive for Pseudomonas and Providencia.  Infectious Disease has been consulted for further evaluation of above-mentioned bacteremia for further antibiotic recommendations.     Today, patient reports feeling well overall without any acute complaints.  She denies any current fever/chills, nausea/vomiting, chest pain, shortness for breath, abdominal pain, flank pain, dysuria/hematuria/pyuria/malodorous urine.  Her only related symptom is of dry cough in which she states has been there for many months, never productive.  She denies any recent intra-abdominal or other genitourinary related surgeries other than previous hysterectomy.   She also denies any recent dental work or any prior history of urinary tract infections.  She does report having a significant callus to her left foot that was incisional removed approximate x6 months ago though did not receive any antibiotic therapy.  Family reported patient recently prepared and ate garfish though denies cutting herself with knife/utensils or poking herself with any of the fish bones.  Family also stated that at home her primary water source is a dispenser that is filled by local watermill jug but is unsure of sanitation.  Does have history of left renal cyst though unfortunately has not follow up in [place] for further evaluation.  Of note; per chart review, patient did have Pseudomonas in 1/2 bottles blood culture in 2022.      Interval History:  Patient sitting in bed.  Daughter at bedside.  Patient with no current complaints except for gout in her feet. Denies fever, chills, night sweats, rash, HA, vision changes, dizziness, CP/SOB, cough, N/V/D, abdominal pain, or urinary complaints.  Patient is afebrile with no leukocytosis.  Inflammatory markers are downtrending.  Noted with transaminitis.  Blood cultures are now negative times 48 hours.  Patient is currently on cefepime with no reported issues.      Antibiotic / Antiviral / Antifungal History:  Vancomycin IV trough 15-20 - 07/17/2024 to 07/19/2024  Zosyn 4.5 g IV x1 dose - 07/17/2024   Cefepime 2 g IV Q 8 hours - 07/1924 to present    MEDICATIONS:   Reviewed in EMR    REVIEW OF SYSTEMS:   Except as documented, all other systems reviewed and negative     PHYSICAL EXAM:   T 98.4 °F (36.9 °C)   BP (!) 146/73   P (!) 58   RR 18   O2 98 %  GENERAL:  Well-developed elderly BF who is AAOx3, pleasant female,  in no acute distress   HEENT: atraumatic, normocephalic, anicteric, moist oral mucosa without lesions, exudate, or erythema; no thrush; endentulous   LUNGS: breathing unlabored, lungs diminished bilateral  HEART: RRR; holosystolic  "murmur best appreciated at aortic auscultation window  ABDOMEN: abdomen soft, nondistended, nontender, with normoactive bowel sounds  : no suprapubic or CVA tenderness noted with palpation  EXTREMITIES: no edema, clubbing, or cyanosis   SKIN: no obvious rashes or lesions, no appreciable foot skin ulceration/breakdown or between webbing of toes  NEURO: speech fluent and intact, facial symmetry preserved, no tremor  PSYCH: cooperative, normal mood and affect  LINES: PIV      LABS AND IMAGING:     Recent Labs     07/21/24 0357 07/22/24 0344   WBC 8.88 8.91   RBC 3.29* 3.21*   HGB 9.7* 9.3*   HCT 29.1* 28.9*   MCV 88.4 90.0   MCH 29.5 29.0   MCHC 33.3 32.2*   RDW 14.4 14.6    301     No results for input(s): "LACTIC" in the last 72 hours.  No results for input(s): "INR", "APTT", "D-DIMER" in the last 72 hours.  No results for input(s): "HGBA1C", "CHOL", "TRIG", "LDL", "VLDL", "HDL" in the last 72 hours.   Recent Labs     07/21/24 0357 07/22/24 0344    139   K 4.0 4.2   CO2 23 24   BUN 17.4 17.8   CREATININE 0.82 0.81   GLUCOSE 112 98   CALCIUM 9.0 9.0   ALBUMIN 2.8* 2.7*   GLOBULIN 3.7* 3.7*   ALKPHOS 209* 257*   ALT 94* 113*   * 108*   BILITOT 0.8 0.8   CRP  --  50.60*     No results for input(s): "BNP", "CPK", "TROPONINI" in the last 72 hours.       Estimated Creatinine Clearance: 53 mL/min (based on SCr of 0.81 mg/dL).   Lab Results   Component Value Date    SEDRATE 78 (H) 07/22/2024      Lab Results   Component Value Date    CRP 50.60 (H) 07/22/2024        Micro:   07/17/2024 blood culture with Pseudomonas aeruginosa   07/17/2024 blood culture with Pseudomonas aeruginosa and Providencia stuartii  07/20/2024 blood cultures x2 NGTD      X-Ray Chest 1 View  Narrative: EXAMINATION:  XR CHEST 1 VIEW    CLINICAL HISTORY:  cough;    TECHNIQUE:  One view    COMPARISON:  July 17, 2024.    FINDINGS:  Cardiopericardial silhouette enlarged appearance similar.  Lungs interstitial markings prominence " suggest mild congestive process.  There is no focally dense consolidation or significant fluid within the pleural spaces.  No pneumothorax.  Impression: Cardiomegaly and mild congestive changes.    Electronically signed by: Miles Branham  Date:    07/18/2024  Time:    11:15          ASSESSMENT & PLAN:     Pseudomonas Aeruginosa & Providencia Stuartii Bacteremia                 -- Hx of Pseudomonas Bacteremia (2022)                 -- Unknown Etiology of Source at this time (7/19/24)    -- No Hx of IBD, Recurrent UTI, Recent Surgeries, or prior prosthetics (joints/fractures)  SIRS (2/4; leukocytosis, febrile) - resolved                 -- Likely 2/2 above bacteremia  Hx of Left Renal Cyst                 -- S/P no intervention or further evaluation in [facility]                 -- CT A/P (7/17/2024) without acute findings or concern for renal abscess  HFpEF (EF of 69% w/ Diastolic Dysfunction  Hx of HTN, HLD, Vit-D Deficiency, Gout  Eosinophilia history  Transaminitis      Sepsis secondary to polymicrobial bacteremia (Pseudomonas aeruginosa and Providencia stuartii) with no clear source  TTE (7/17/2024) w/o appreciable valvular lesions  With possible etiology of Strongyloides origin (possible self-reinfection) causing gram negative bacteremia; pending Strongyloides IgG Antibodies  Patient is afebrile with no leukocytosis       RECOMMENDATIONS:      Pt will need to continue Cefepime 2 grams IV q 8 hours to complete a 14 day course of IV antibiotics for polymicrobial bacteremia.  Starting date from negative blood culture. Today is day 3 of 14.   Okay to place Midline    Orders for OPAT have been written and can be found in the chart.  Patient and family want to do OPAT at home.  Case management to assist in setting this up.    Follow-up Strongyloides results. IM team to follow up results in [facility] clinic   Monitor liver enzymes   ID will sign off at this time.  Thank you for the consult.  Patient does not need outpatient  ID follow-up.  Please call for any additional questions        Jayshree Paz, NHUNG  [facility] Infectious Diseases     *Portions of this note dictated using EMR integrated voice recognition software, and may be subject to voice recognition errors not corrected at proofreading. Please contact writer for clarification if needed. *

## 2024-07-22 NOTE — PLAN OF CARE
Problem: Occupational Therapy  Goal: Occupational Therapy Goal  Description: Perform lower body dressing with Mod I.  Perform toilet transfer with Mod I using RW and grab bars.  Perform grooming at sink in standing with mod I and use of RW.  Perform toilet routine/hygiene with mod I using RW.  Outcome: Met

## 2024-07-23 VITALS
OXYGEN SATURATION: 99 % | DIASTOLIC BLOOD PRESSURE: 64 MMHG | TEMPERATURE: 97 F | RESPIRATION RATE: 18 BRPM | HEIGHT: 64 IN | WEIGHT: 197 LBS | SYSTOLIC BLOOD PRESSURE: 130 MMHG | HEART RATE: 60 BPM | BODY MASS INDEX: 33.63 KG/M2

## 2024-07-23 PROBLEM — R60.0 LOWER EXTREMITY EDEMA: Status: ACTIVE | Noted: 2024-07-23

## 2024-07-23 PROBLEM — R74.01 TRANSAMINITIS: Status: ACTIVE | Noted: 2024-07-23

## 2024-07-23 PROBLEM — R53.1 WEAKNESS: Status: ACTIVE | Noted: 2024-07-23

## 2024-07-23 PROBLEM — A41.9 SEPSIS: Status: ACTIVE | Noted: 2024-07-23

## 2024-07-23 LAB
ALBUMIN SERPL-MCNC: 2.7 G/DL (ref 3.4–4.8)
ALBUMIN/GLOB SERPL: 0.7 RATIO (ref 1.1–2)
ALP SERPL-CCNC: 214 UNIT/L (ref 40–150)
ALT SERPL-CCNC: 81 UNIT/L (ref 0–55)
ANION GAP SERPL CALC-SCNC: 6 MEQ/L
AST SERPL-CCNC: 55 UNIT/L (ref 5–34)
BASOPHILS # BLD AUTO: 0.05 X10(3)/MCL
BASOPHILS NFR BLD AUTO: 0.6 %
BILIRUB SERPL-MCNC: 0.6 MG/DL
BUN SERPL-MCNC: 20.3 MG/DL (ref 9.8–20.1)
CALCIUM SERPL-MCNC: 9 MG/DL (ref 8.4–10.2)
CHLORIDE SERPL-SCNC: 106 MMOL/L (ref 98–107)
CO2 SERPL-SCNC: 25 MMOL/L (ref 23–31)
CREAT SERPL-MCNC: 0.77 MG/DL (ref 0.55–1.02)
CREAT/UREA NIT SERPL: 26
EOSINOPHIL # BLD AUTO: 0.8 X10(3)/MCL (ref 0–0.9)
EOSINOPHIL NFR BLD AUTO: 8.9 %
ERYTHROCYTE [DISTWIDTH] IN BLOOD BY AUTOMATED COUNT: 14.5 % (ref 11.5–17)
GFR SERPLBLD CREATININE-BSD FMLA CKD-EPI: >60 ML/MIN/1.73/M2
GLOBULIN SER-MCNC: 3.7 GM/DL (ref 2.4–3.5)
GLUCOSE SERPL-MCNC: 104 MG/DL (ref 82–115)
HCT VFR BLD AUTO: 28.9 % (ref 37–47)
HGB BLD-MCNC: 9.3 G/DL (ref 12–16)
HOLD SPECIMEN: NORMAL
IMM GRANULOCYTES # BLD AUTO: 0.1 X10(3)/MCL (ref 0–0.04)
IMM GRANULOCYTES NFR BLD AUTO: 1.1 %
LYMPHOCYTES # BLD AUTO: 2.18 X10(3)/MCL (ref 0.6–4.6)
LYMPHOCYTES NFR BLD AUTO: 24.3 %
MCH RBC QN AUTO: 29.1 PG (ref 27–31)
MCHC RBC AUTO-ENTMCNC: 32.2 G/DL (ref 33–36)
MCV RBC AUTO: 90.3 FL (ref 80–94)
MONOCYTES # BLD AUTO: 0.93 X10(3)/MCL (ref 0.1–1.3)
MONOCYTES NFR BLD AUTO: 10.4 %
NEUTROPHILS # BLD AUTO: 4.9 X10(3)/MCL (ref 2.1–9.2)
NEUTROPHILS NFR BLD AUTO: 54.7 %
NRBC BLD AUTO-RTO: 0 %
PLATELET # BLD AUTO: 316 X10(3)/MCL (ref 130–400)
PMV BLD AUTO: 10.3 FL (ref 7.4–10.4)
POTASSIUM SERPL-SCNC: 4.3 MMOL/L (ref 3.5–5.1)
PROT SERPL-MCNC: 6.4 GM/DL (ref 5.8–7.6)
RBC # BLD AUTO: 3.2 X10(6)/MCL (ref 4.2–5.4)
SODIUM SERPL-SCNC: 137 MMOL/L (ref 136–145)
WBC # BLD AUTO: 8.96 X10(3)/MCL (ref 4.5–11.5)

## 2024-07-23 PROCEDURE — 25000003 PHARM REV CODE 250: Performed by: INTERNAL MEDICINE

## 2024-07-23 PROCEDURE — A4216 STERILE WATER/SALINE, 10 ML: HCPCS | Performed by: INTERNAL MEDICINE

## 2024-07-23 PROCEDURE — 85025 COMPLETE CBC W/AUTO DIFF WBC: CPT

## 2024-07-23 PROCEDURE — 80053 COMPREHEN METABOLIC PANEL: CPT

## 2024-07-23 PROCEDURE — 25000003 PHARM REV CODE 250: Performed by: STUDENT IN AN ORGANIZED HEALTH CARE EDUCATION/TRAINING PROGRAM

## 2024-07-23 PROCEDURE — 63600175 PHARM REV CODE 636 W HCPCS: Performed by: STUDENT IN AN ORGANIZED HEALTH CARE EDUCATION/TRAINING PROGRAM

## 2024-07-23 PROCEDURE — 63600175 PHARM REV CODE 636 W HCPCS

## 2024-07-23 PROCEDURE — 25000003 PHARM REV CODE 250

## 2024-07-23 PROCEDURE — 36415 COLL VENOUS BLD VENIPUNCTURE: CPT

## 2024-07-23 PROCEDURE — 97116 GAIT TRAINING THERAPY: CPT

## 2024-07-23 RX ADMIN — HEPARIN SODIUM 5000 UNITS: 5000 INJECTION INTRAVENOUS; SUBCUTANEOUS at 05:07

## 2024-07-23 RX ADMIN — CEFEPIME 2 G: 2 INJECTION, POWDER, FOR SOLUTION INTRAVENOUS at 01:07

## 2024-07-23 RX ADMIN — SODIUM CHLORIDE, POTASSIUM CHLORIDE, SODIUM LACTATE AND CALCIUM CHLORIDE: 600; 310; 30; 20 INJECTION, SOLUTION INTRAVENOUS at 05:07

## 2024-07-23 RX ADMIN — COLCHICINE 0.6 MG: 0.6 TABLET, FILM COATED ORAL at 08:07

## 2024-07-23 RX ADMIN — HEPARIN SODIUM 5000 UNITS: 5000 INJECTION INTRAVENOUS; SUBCUTANEOUS at 02:07

## 2024-07-23 RX ADMIN — Medication 10 ML: at 08:07

## 2024-07-23 RX ADMIN — TRIAMTERENE AND HYDROCHLOROTHIAZIDE 1 TABLET: 37.5; 25 CAPSULE ORAL at 08:07

## 2024-07-23 RX ADMIN — ATORVASTATIN CALCIUM 20 MG: 20 TABLET, FILM COATED ORAL at 08:07

## 2024-07-23 RX ADMIN — LISINOPRIL 40 MG: 10 TABLET ORAL at 08:07

## 2024-07-23 RX ADMIN — CEFEPIME 2 G: 2 INJECTION, POWDER, FOR SOLUTION INTRAVENOUS at 03:07

## 2024-07-23 RX ADMIN — AMLODIPINE BESYLATE 10 MG: 10 TABLET ORAL at 08:07

## 2024-07-23 RX ADMIN — POLYETHYLENE GLYCOL 3350 17 G: 17 POWDER, FOR SOLUTION ORAL at 08:07

## 2024-07-23 RX ADMIN — ALLOPURINOL 200 MG: 100 TABLET ORAL at 08:07

## 2024-07-23 RX ADMIN — Medication 10 ML: at 01:07

## 2024-07-23 RX ADMIN — CEFEPIME 2 G: 2 INJECTION, POWDER, FOR SOLUTION INTRAVENOUS at 08:07

## 2024-07-23 NOTE — PROGRESS NOTES
Inpatient Nutrition Assessment    Admit Date: 7/17/2024   Total duration of encounter: 6 days   Patient Age: 89 y.o.    Nutrition Recommendation/Prescription     Continue Heart Healthy diet as tolerated  Monitor po intake, wt, labs    Communication of Recommendations: reviewed with patient and reviewed with family    Nutrition Assessment     Malnutrition Assessment/Nutrition-Focused Physical Exam    Malnutrition Context: acute illness or injury (Does not meet criteria) (07/23/24 1131)  Malnutrition Level:  (Does not meet criteria) (07/23/24 1131)  Energy Intake (Malnutrition):  (Does not meet criteria) (07/23/24 1131)  Weight Loss (Malnutrition):  (Does not meet criteria) (07/23/24 1131)  Subcutaneous Fat (Malnutrition):  (Does not meet criteria) (07/23/24 1131)  Orbital Region (Subcutaneous Fat Loss): well nourished  Upper Arm Region (Subcutaneous Fat Loss): well nourished     Muscle Mass (Malnutrition):  (Does not meet criteria) (07/23/24 1131)  Judaism Region (Muscle Loss): well nourished                                A minimum of two characteristics is recommended for diagnosis of either severe or non-severe malnutrition.    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, and gout    Scheduled Medications:  allopurinoL, 200 mg, Daily  amLODIPine, 10 mg, Daily  atorvastatin, 20 mg, Daily  ceFEPime IV (PEDS and ADULTS), 2 g, Q8H  colchicine, 0.6 mg, Daily  heparin (porcine), 5,000 Units, Q8H  lisinopriL, 40 mg, Daily  polyethylene glycol, 17 g, Daily  sodium chloride 0.9%, 10 mL, Q6H  triamterene-hydrochlorothiazide 37.5-25 mg, 1 tablet,  Daily    Continuous Infusions:  lactated ringers, Last Rate: 50 mL/hr at 07/23/24 0624    PRN Medications:  acetaminophen, 650 mg, Q6H PRN  dextrose 10%, 12.5 g, PRN  dextrose 10%, 25 g, PRN  glucagon (human recombinant), 1 mg, PRN  glucose, 16 g, PRN  glucose, 24 g, PRN  naloxone, 0.02 mg, PRN  ondansetron, 4 mg, Q8H PRN  sodium chloride 0.9%, 10 mL, Q12H PRN  sodium chloride 0.9%, 10 mL, PRN    Calorie Containing IV Medications: no significant kcals from medications at this time    Recent Labs   Lab 07/17/24  1102 07/18/24  0336 07/19/24  0515 07/20/24  0452 07/21/24  0357 07/22/24  0344 07/23/24  0440    138 138 137 136 139 137   K 3.6 3.5 3.3* 3.8 4.0 4.2 4.3   CALCIUM 9.2 9.2 9.0 9.2 9.0 9.0 9.0   MG 2.30  --   --   --   --   --   --    CO2 26 24 26 23 23 24 25   BUN 33.3* 24.8* 17.1 15.3 17.4 17.8 20.3*   CREATININE 1.26* 0.94 0.93 0.93 0.82 0.81 0.77   EGFRNORACEVR 41 59 60 60 >60 >60 >60   GLUCOSE 127* 89 109 112 112 98 104   BILITOT 1.2 1.4 1.0 0.8 0.8 0.8 0.6   ALKPHOS 69 68 106 141 209* 257* 214*   ALT 14 13 30 56* 94* 113* 81*   AST 27 24 54* 67* 120* 108* 55*   ALBUMIN 3.5 3.0* 3.0* 2.9* 2.8* 2.7* 2.7*   CRP  --   --   --  59.20*  --  50.60*  --    LIPASE 23  --   --   --   --   --   --    WBC 18.60* 13.03* 9.75 8.96 8.88 8.91 8.96   HGB 10.0* 9.4* 10.2* 9.6* 9.7* 9.3* 9.3*   HCT 31.6* 29.4* 30.4* 30.1* 29.1* 28.9* 28.9*     Nutrition Orders:  Diet Heart Healthy      Appetite/Oral Intake: good/% of meals  Factors Affecting Nutritional Intake: none identified  Social Needs Impacting Access to Food: none identified  Food/Gnosticism/Cultural Preferences:  No pork, fried foods, eggs; likes vegetables, chicken, toast/jelly, fruits, oatmeal, grits  Food Allergies: no known food allergies  Last Bowel Movement: 07/22/24  Wound(s):  None noted    Comments  7/23: Pt sitting up in bed, fly present in room. Pt reports good po intake, consuming >75% of meals. Pt denies any GI complaints at this time. Pt  "reports good appetite and denies any recent wt loss pta. Pt states UBW ~197#.       Anthropometrics    Height: 5' 4" (162.6 cm), Height Method: Stated  Last Weight: 89.4 kg (197 lb) (24 1200), Weight Method: Standard Scale  BMI (Calculated): 33.8  BMI Classification: obese grade I (BMI 30-34.9)     Ideal Body Weight (IBW), Female: 120 lb     % Ideal Body Weight, Female (lb): 164.17 %                    Usual Body Weight (UBW), k.4 kg (#)  % Usual Body Weight: 100.16     Usual Weight Provided By: patient    Wt Readings from Last 5 Encounters:   24 89.4 kg (197 lb)   24 93 kg (205 lb 0.4 oz)   24 93 kg (205 lb)   24 90.7 kg (200 lb)   23 93 kg (205 lb)     Weight Change(s) Since Admission:   Wt Readings from Last 1 Encounters:   24 1200 89.4 kg (197 lb)   24 0934 89.7 kg (197 lb 10.3 oz)   Admit Weight: 89.7 kg (197 lb 10.3 oz) (24 0934), Weight Method: Standard Scale    Estimated Needs    Weight Used For Calorie Calculations: 89.4 kg (197 lb 1.5 oz)  Energy Calorie Requirements (kcal): 1788 kcal (20 kcal/kg)  Energy Need Method: Kcal/kg  Weight Used For Protein Calculations: 89.4 kg (197 lb 1.5 oz)  Protein Requirements: 90g (1.0g/kg)  Fluid Requirements (mL): 1788mL or per MD (1mL/kcal)        Enteral Nutrition     Patient not receiving enteral nutrition at this time.    Parenteral Nutrition     Patient not receiving parenteral nutrition support at this time.    Evaluation of Received Nutrient Intake    Calories: meeting estimated needs  Protein: meeting estimated needs    Patient Education     Not applicable.    Nutrition Diagnosis     PES: Overweight/obesity related to  excessive energy intake as evidenced by BMI 30-34.9. (new)     Nutrition Interventions     Intervention(s): general/healthful diet and collaboration with other providers    Goal: Meet greater than 80% of nutritional needs by follow-up. (new)    Nutrition Goals & Monitoring "     Dietitian will monitor: food and beverage intake and weight  Discharge planning: continue Heart Healthy diet  Nutrition Risk/Follow-Up: low (follow-up in 5-7 days)   Please consult if re-assessment needed sooner.

## 2024-07-23 NOTE — PT/OT/SLP PROGRESS
Physical Therapy Treatment and Discharge Note    Patient Name:  Leda Gallardo   MRN:  [id number]    Recommendations     Therapy Intensity Recommendations at Discharge: Low Intensity Therapy  Discharge Equipment Recommendations: walker, rolling, shower chair, grab bar (patient has a grab bar and shower chair)   Equipment to be obtained for discharge: rolling walker.  Barriers to discharge: fall risk and decreased endurance    Assessment     Leda Gallardo is a 89 y.o. female admitted with a medical diagnosis of Weakness.  1. Sepsis, due to unspecified organism, unspecified whether acute organ dysfunction present    2. Weakness    3. Fatigue    4. Elevated troponin I level    5. Chest pain    6. Lower extremity edema       Patient Active Problem List   Diagnosis    Hyperlipidemia    Hypertension    Obesity    Renal mass    Vitamin D deficiency    Weakness    Sepsis    Lower extremity edema    Transaminitis      She presents with the following impairments/functional limitations:  gait instability, impaired endurance, impaired balance, pain, impaired functional mobility, edema.    Rehab Prognosis: Good.    Hospital discharge anticipated today.    Recent Surgery: * No surgery found *      Plan     Patient discharged from acute PT Services on 07/23/24.    Hospital discharge anticipated today.    -continued: up-to-chair, ambulation, with progression of gait distance/frequency/duration and speed, as tolerated/appropriate, with assistance and supervision  (AS ORDERED BY M.DRedd)    Subjective     Communicated with patient's nurse Summer prior to session.    Patient agreeable to participate in treatment session.    Chief Complaint: Rt foot pain  Patient/Family Comments/goals: home today  Pain/Comfort:  Pain Rating 1: 4/10  Location - Side 1: Right  Location 1: foot  Pain Addressed 1: Nurse notified  Pain Rating Post-Intervention 1: 4/10    Objective     Patient found sitting edge of bed with PICC line with patients daughter in  room upon PT entry to room.    General Precautions: Standard, fall   Orthopedic Precautions:N/A   Braces: N/A  Respiratory Status: room air  SAT O2 Check: n/a    Functional Mobility:    Bed Mobility:  Seated on edge of bed (window side) at start of session and returned to edge of bed (door side) at end of session    Transfers:  Sit to Stand: contact guard assistance with hand-held assist and rolling walker  with no cues required    Gait:  Patient ambulated 70ft  Sitting pause x4 minutes  Patient ambulated 65ft  with hand-held assist and rolling walker and contact guard assistance.  Patient demonstrates :       no loss of balance       no mis-steps       occasional unsteady gait       decreased left step length       wide base of support       decreased right foot/floor clearance       slowed mvmts    Other Mobility:  not assessed    Balance:  Sit  Patient demonstrated static balance on level surface with independence with no verbal cues.  Patient demonstrated dynamic balance on level surface with independence with no verbal cues during moderate excursions.  Stand  Patient demonstrated static balance on level surface  using rolling walker with stand by assistance with no verbal cues.    Patient left sitting edge of bed (door side of room) with PICC line with all lines intact, call button in reach, tray table at bedside, patient's nurse notified, and patients daughter present.    Goals - 2 OUT OF 5 STG's met by patient     Multidisciplinary Problems       Physical Therapy Goals       Problem: Physical Therapy    Goal Priority Disciplines Outcome Goal Variances Interventions   Physical Therapy Goal     PT, PT/OT Progressing     Description: REVIEWED 07/23/2024  Goals to be met by: DISCHARGE  Patient will increase functional independence with mobility by performing:  -. Supine to sit with Modified [place] met 7/22/2024  -. Sit to supine with Modified [place] - ONGOING - NOT MET  -. Rolling to Left and Right  with Modified [place] met 7/22/2024  -. Sit to stand transfer with Supervision - ONGOING - NOT MET  -. Gait  x 130 feet with Supervision using Rolling Walker - ONGOING - NOT MET           Time Tracking     PT Received On: 07/23/24  PT Start Time: 1645     PT Stop Time: 1702  PT Total Time (min): 17 min     Billable Minutes: Gait Training 17  Non-Billable Minutes: n/a  07/23/2024

## 2024-07-23 NOTE — DISCHARGE SUMMARY
[facility] - [address] [place] [address] Medicine  Discharge Summary      Patient Name: Leda Gallardo  MRN: [id number]  ANDRIA: [id number]  Patient Class: IP- Inpatient  Admission Date: 7/17/2024  Hospital Length of Stay: 4 days  Discharge Date and Time: No discharge date for patient encounter.  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 VSS). Patient reports that last night she began to experience episodes of chills. This morning when patient woke up was also having severe, diffuse weakness that made her concerned she would fall. Denies any headaches, vision changes, cold/congestion, sore throat, CP, SOB, abdominal pain, or urinary symptoms. No vomiting but does reports some nausea overnight. Also having mild, dry cough. No diarrhea. Patient does report intermittent pain in feet 2/2 gout, but is currently asymptomatic. States lives at home with family and usually able to ambulate on her own without any assistive devices.      In the ED, the patient was afebrile. HR 83, RR 18, /60. O2 98% on RA. CBC remarkable for leukocytosis of 18.6, H/H 10.0/31.6. BUN/Cr 33.3/1.26 (baseline Cr <1). Trop 0.565, LA 2.0, TSH wnl. COVID/FLU/RSV neg. UA trace protein and 1+ blood. CXR no acute process. EKG NSR. Blood cx pending. Patient was started on vanc and zosyn. Patient was admitted for leukocytosis of unknown origin.    Hospital Course:   Patient presented complaining of 1 day of chills and generalized weakness.  Denied any other symptoms.  No headache, vision changes, cough/congestion,  chest pain, shortness of breath, abdominal pain, nausea/vomiting, or open wounds.  Blood cultures positive for Pseudomonas and providencia sensitive to cefepime.  Extensive discussion about possible source of infection, unable to determine source.  Of note, patient did have a Pseudomonas bacteremia in 2022.  Patient responded well to IV antibiotic therapy while inpatient. Discharged with 2 weeks of outpatient IV antibiotic therapy.    Physical Exam  Vitals and nursing note reviewed.   Constitutional:       General: She is not in acute distress.     Appearance: She is not ill-appearing, toxic-appearing or diaphoretic.   Eyes:      Conjunctiva/sclera: Conjunctivae normal.   Cardiovascular:      Rate and Rhythm: Normal rate and regular rhythm.      Heart sounds: Normal heart sounds. Heart sounds not distant. No murmur heard.     No friction rub. No gallop.   Pulmonary:      Effort: Pulmonary effort is normal. No accessory muscle usage, prolonged expiration or respiratory distress.      Breath sounds: Normal breath sounds and air entry. No decreased breath sounds or wheezing.   Abdominal:      General: Abdomen is flat. Bowel sounds are normal. There is no distension.      Palpations: Abdomen is soft.      Tenderness: There is no abdominal tenderness. There is no guarding.   Musculoskeletal:      Cervical back: Full passive range of motion without pain and normal range of motion.      Right lower leg: No edema.      Left lower leg: No edema.   Skin:     General: Skin is warm.      Capillary Refill: Capillary refill takes less than 2 seconds.   Neurological:      Mental Status: She is alert.       Consults:   Consults (From admission, onward)          Status Ordering Provider     Inpatient consult to Social Work/Case Management  Once        Provider:  (Not yet assigned)    Completed ISIS PANCHAL     Inpatient consult to Midline team  Once        Provider:  (Not yet assigned)    Acknowledged ISIS PANCHAL     Inpatient consult  to Social Work/Case Management  Once        Provider:  (Not yet assigned)    ISIS Chiu     Inpatient consult to Infectious Diseases  Once        Provider:  Rob Mohan MD    Completed ZANDER RAMIREZ     Inpatient consult to Internal Medicine  Once        Provider:  Mukesh Wilkinson MD    Acknowledged ZANDER RAMIREZ              Discharged Condition: stable    Disposition:     Follow Up:    Patient Instructions:   No discharge procedures on file.    Significant Diagnostic Studies: N/A    Pending Diagnostic Studies:       Procedure Component Value Units Date/Time    EXTRA TUBES [[id number]]     Order Status: Sent Lab Status: No result     Specimen: Blood, Venous     Narrative:      The following orders were created for panel order EXTRA TUBES.  Procedure                               Abnormality         Status                     ---------                               -----------         ------                     Gold Top Hold[[id number]]                                                                Please view results for these tests on the individual orders.    Gold Top Hold [[id number]]     Order Status: Sent Lab Status: No result     Specimen: Blood, Venous            Medications:  Reconciled Home Medications:      Medication List        START taking these medications      colchicine 0.6 mg tablet  Commonly known as: COLCRYS  Take 1 tablet (0.6 mg total) by mouth daily as needed (Gout flare).            CONTINUE taking these medications      * allopurinoL 200 mg Tab  Take 200 mg by mouth once daily.     * allopurinoL 100 MG tablet  Commonly known as: ZYLOPRIM  Take 2 tablets (200 mg total) by mouth once daily.     amLODIPine 10 MG tablet  Commonly known as: NORVASC  Take 1 tablet (10 mg total) by mouth once daily.     benazepriL 40 MG tablet  Commonly known as: LOTENSIN  Take 1 tablet (40 mg total) by mouth once daily.     carbamide peroxide 6.5 % otic solution  Commonly known as:  DEBROX  Place 5 drops into both ears as needed.     * diclofenac sodium 1 % Gel  Commonly known as: VOLTAREN  Apply 2 g topically 3 (three) times daily as needed (ankle pain).     * diclofenac 50 MG EC tablet  Commonly known as: VOLTAREN  Take 1 tablet (50 mg total) by mouth 2 (two) times daily as needed (pain).     famotidine 20 MG tablet  Commonly known as: PEPCID  Take 1 tablet (20 mg total) by mouth nightly as needed for Heartburn.     meclizine 25 mg tablet  Commonly known as: ANTIVERT  Take 1 tablet (25 mg total) by mouth 3 (three) times daily as needed for Dizziness.     MILK OF MAGNESIA ORAL  Take by mouth.     simvastatin 40 MG tablet  Commonly known as: ZOCOR  Take 1 tablet (40 mg total) by mouth every evening.     triamterene-hydrochlorothiazide 37.5-25 mg 37.5-25 mg per capsule  Commonly known as: DYAZIDE  Take 1 capsule by mouth once daily.           * This list has 4 medication(s) that are the same as other medications prescribed for you. Read the directions carefully, and ask your doctor or other care provider to review them with you.                     Rm Portillo MD  [facility] -II  Department of Hospital Medicine  [facility] [address] [facility] Telemetry

## 2024-07-23 NOTE — PLAN OF CARE
Problem: Adult Inpatient Plan of Care  Goal: Plan of Care Review  Outcome: Met  Goal: Patient-Specific Goal (Individualized)  Outcome: Met  Goal: Absence of Hospital-Acquired Illness or Injury  Outcome: Met  Goal: Optimal Comfort and Wellbeing  Outcome: Met  Goal: Readiness for Transition of Care  Outcome: Met     Problem: Fall Injury Risk  Goal: Absence of Fall and Fall-Related Injury  Outcome: Met     Problem: Skin Injury Risk Increased  Goal: Skin Health and Integrity  Outcome: Met     Problem: Infection  Goal: Absence of Infection Signs and Symptoms  Outcome: Met     Problem: Sepsis/Septic Shock  Goal: Optimal Coping  Outcome: Met  Goal: Absence of Bleeding  Outcome: Met  Goal: Blood Glucose Level Within Targeted Range  Outcome: Met  Goal: Absence of Infection Signs and Symptoms  Outcome: Met  Goal: Optimal Nutrition Intake  Outcome: Met

## 2024-07-23 NOTE — PT/OT/SLP PROGRESS
Physical Therapy    Missed Treatment Session - cancel note    Patient Name:  Leda Gallardo   MRN:  [id number]      -patient not seen at this time secondary to patient not available  -patient/family instruction on home antibiotic administration continuing  -will follow-up as patient is appropriate/available/agreeable to participate and as therapists' schedule allows.   This patient is a 76yoF with PMH of atrial fibrillation on eliquis, CVA in 2017, T2DM (last A1C 7.3 in 11/2019), anxiety, htn, hLd, CKD stage 3, CAD s/p MIA who presents to the ED for dark stool and weakness. The patient was previously admitted from 1/14-1/16/2020 for ZAMORA and abnormal NST. She underwent LHC with MIA to [facility]. She was planned to continue with aspirin, plavix and eliquis for a month. The patient returns to the ED with complaint of fatigue and dark stool. She reports that stool appeared darker since discharge from the hospital. She lacks the energy to walk around at home or even to shower, which is new. She denies any abdominal pain, but endorses feeling lightheaded and nauseous. She has no history of gastritis or PUD and has never undergone an endoscopy or colonoscopy. The patient's ZAMORA, however had resolved and she also denies having any chest pain or palpitations. This patient is a 76yoF with PMH of atrial fibrillation on eliquis, CVA in 2017, T2DM (last A1C 7.3 in 11/2019), anxiety, htn, hLd, CKD stage 3, CAD s/p MIA who presents to the ED for dark stool and weakness, found to have anemia in the setting of recently starting triple therapy after stent placement.

## 2024-07-23 NOTE — PT/OT/SLP PROGRESS
Physical Therapy    Missed Treatment Session - cancel note    Patient Name:  Leda Gallardo   MRN:  [id number]      -patient not seen at this time secondary to patient not available  -patient/family receiving instruction on home antibiotic administration  -will follow-up as patient is appropriate/available/agreeable to participate and as therapists' schedule allows.

## 2024-07-24 ENCOUNTER — PATIENT OUTREACH (OUTPATIENT)
Dept: ADMINISTRATIVE | Facility: CLINIC | Age: 89
End: 2024-07-24
Payer: MEDICARE

## 2024-07-24 NOTE — PROGRESS NOTES
C3 nurse spoke with Leda Gallardo's daughter, Deneen for a TCC post hospital discharge follow up call. The patient has a scheduled HOSFU appointment with Rm Frazier MD (Internal medicine) on 8/8/24 @ 10:00.

## 2024-07-24 NOTE — PT/OT/SLP DISCHARGE
Occupational Therapy Discharge Summary    Leda Gallardo  MRN: [id number]   Principal Problem: Weakness      Patient Discharged from acute Occupational Therapy on 7/23/2024.  Please refer to prior OT note dated 7/17/2024 for functional status.    Assessment:      Patient has met all goals and is not appropriate for therapy.    Objective:     GOALS:   Multidisciplinary Problems       Occupational Therapy Goals       Not on file              Multidisciplinary Problems (Resolved)          Problem: Occupational Therapy    Goal Priority Disciplines Outcome Interventions   Occupational Therapy Goal   (Resolved)     OT, PT/OT Met    Description: Perform lower body dressing with Mod I.  Perform toilet transfer with Mod I using RW and grab bars.  Perform grooming at sink in standing with mod I and use of RW.  Perform toilet routine/hygiene with mod I using RW.                       Reasons for Discontinuation of Therapy Services  Satisfactory goal achievement.      Plan:     Patient Discharged to: Home no OT services needed    7/24/2024

## 2024-07-25 ENCOUNTER — PATIENT MESSAGE (OUTPATIENT)
Dept: INTERNAL MEDICINE | Facility: CLINIC | Age: 89
End: 2024-07-25
Payer: MEDICARE

## 2024-07-25 ENCOUNTER — PATIENT MESSAGE (OUTPATIENT)
Dept: ADMINISTRATIVE | Facility: OTHER | Age: 89
End: 2024-07-25
Payer: MEDICARE

## 2024-07-25 ENCOUNTER — LAB REQUISITION (OUTPATIENT)
Dept: LAB | Facility: HOSPITAL | Age: 89
End: 2024-07-25
Payer: MEDICARE

## 2024-07-25 DIAGNOSIS — A41.9 SEPSIS, UNSPECIFIED ORGANISM: ICD-10-CM

## 2024-07-25 LAB
ALBUMIN SERPL-MCNC: 3.1 G/DL (ref 3.4–4.8)
ALBUMIN/GLOB SERPL: 0.9 RATIO (ref 1.1–2)
ALP SERPL-CCNC: 188 UNIT/L (ref 40–150)
ALT SERPL-CCNC: 51 UNIT/L (ref 0–55)
ANION GAP SERPL CALC-SCNC: 9 MEQ/L
AST SERPL-CCNC: 32 UNIT/L (ref 5–34)
BACTERIA BLD CULT: NORMAL
BACTERIA BLD CULT: NORMAL
BILIRUB SERPL-MCNC: 0.5 MG/DL
BUN SERPL-MCNC: 29.9 MG/DL (ref 9.8–20.1)
CALCIUM SERPL-MCNC: 9 MG/DL (ref 8.4–10.2)
CHLORIDE SERPL-SCNC: 103 MMOL/L (ref 98–107)
CO2 SERPL-SCNC: 23 MMOL/L (ref 23–31)
CREAT SERPL-MCNC: 0.91 MG/DL (ref 0.55–1.02)
CREAT/UREA NIT SERPL: 33
GFR SERPLBLD CREATININE-BSD FMLA CKD-EPI: >60 ML/MIN/1.73/M2
GLOBULIN SER-MCNC: 3.6 GM/DL (ref 2.4–3.5)
GLUCOSE SERPL-MCNC: 110 MG/DL (ref 82–115)
POTASSIUM SERPL-SCNC: 4.5 MMOL/L (ref 3.5–5.1)
PROT SERPL-MCNC: 6.7 GM/DL (ref 5.8–7.6)
SODIUM SERPL-SCNC: 135 MMOL/L (ref 136–145)

## 2024-07-25 PROCEDURE — 80053 COMPREHEN METABOLIC PANEL: CPT | Performed by: STUDENT IN AN ORGANIZED HEALTH CARE EDUCATION/TRAINING PROGRAM

## 2024-07-29 ENCOUNTER — LAB REQUISITION (OUTPATIENT)
Dept: LAB | Facility: HOSPITAL | Age: 89
End: 2024-07-29
Payer: MEDICARE

## 2024-07-29 ENCOUNTER — DOCUMENTATION ONLY (OUTPATIENT)
Dept: INFECTIOUS DISEASES | Facility: HOSPITAL | Age: 89
End: 2024-07-29
Payer: MEDICARE

## 2024-07-29 DIAGNOSIS — A41.9 SEPSIS, UNSPECIFIED ORGANISM: ICD-10-CM

## 2024-07-29 LAB
ALBUMIN SERPL-MCNC: 3.2 G/DL (ref 3.4–4.8)
ALBUMIN/GLOB SERPL: 0.9 RATIO (ref 1.1–2)
ALP SERPL-CCNC: 127 UNIT/L (ref 40–150)
ALT SERPL-CCNC: 23 UNIT/L (ref 0–55)
ANION GAP SERPL CALC-SCNC: 9 MEQ/L
AST SERPL-CCNC: 16 UNIT/L (ref 5–34)
BASOPHILS # BLD AUTO: 0.06 X10(3)/MCL
BASOPHILS NFR BLD AUTO: 0.7 %
BILIRUB SERPL-MCNC: 0.7 MG/DL
BUN SERPL-MCNC: 28 MG/DL (ref 9.8–20.1)
CALCIUM SERPL-MCNC: 9.4 MG/DL (ref 8.4–10.2)
CHLORIDE SERPL-SCNC: 104 MMOL/L (ref 98–107)
CK SERPL-CCNC: 35 U/L (ref 29–168)
CO2 SERPL-SCNC: 25 MMOL/L (ref 23–31)
CREAT SERPL-MCNC: 0.86 MG/DL (ref 0.55–1.02)
CREAT/UREA NIT SERPL: 33
CRP SERPL-MCNC: 3.3 MG/L
EOSINOPHIL # BLD AUTO: 0.99 X10(3)/MCL (ref 0–0.9)
EOSINOPHIL NFR BLD AUTO: 11.8 %
ERYTHROCYTE [DISTWIDTH] IN BLOOD BY AUTOMATED COUNT: 14.5 % (ref 11.5–17)
ERYTHROCYTE [SEDIMENTATION RATE] IN BLOOD: 32 MM/HR (ref 0–20)
GFR SERPLBLD CREATININE-BSD FMLA CKD-EPI: >60 ML/MIN/1.73/M2
GLOBULIN SER-MCNC: 3.6 GM/DL (ref 2.4–3.5)
GLUCOSE SERPL-MCNC: 94 MG/DL (ref 82–115)
HCT VFR BLD AUTO: 30.5 % (ref 37–47)
HGB BLD-MCNC: 9.9 G/DL (ref 12–16)
IMM GRANULOCYTES # BLD AUTO: 0.03 X10(3)/MCL (ref 0–0.04)
IMM GRANULOCYTES NFR BLD AUTO: 0.4 %
LYMPHOCYTES # BLD AUTO: 1.94 X10(3)/MCL (ref 0.6–4.6)
LYMPHOCYTES NFR BLD AUTO: 23.2 %
MCH RBC QN AUTO: 29.3 PG (ref 27–31)
MCHC RBC AUTO-ENTMCNC: 32.5 G/DL (ref 33–36)
MCV RBC AUTO: 90.2 FL (ref 80–94)
MONOCYTES # BLD AUTO: 0.69 X10(3)/MCL (ref 0.1–1.3)
MONOCYTES NFR BLD AUTO: 8.2 %
NEUTROPHILS # BLD AUTO: 4.66 X10(3)/MCL (ref 2.1–9.2)
NEUTROPHILS NFR BLD AUTO: 55.7 %
PLATELET # BLD AUTO: 438 X10(3)/MCL (ref 130–400)
PMV BLD AUTO: 9.6 FL (ref 7.4–10.4)
POTASSIUM SERPL-SCNC: 4.8 MMOL/L (ref 3.5–5.1)
PROT SERPL-MCNC: 6.8 GM/DL (ref 5.8–7.6)
RBC # BLD AUTO: 3.38 X10(6)/MCL (ref 4.2–5.4)
SODIUM SERPL-SCNC: 138 MMOL/L (ref 136–145)
WBC # BLD AUTO: 8.37 X10(3)/MCL (ref 4.5–11.5)

## 2024-07-29 PROCEDURE — 85652 RBC SED RATE AUTOMATED: CPT

## 2024-07-29 PROCEDURE — 80053 COMPREHEN METABOLIC PANEL: CPT

## 2024-07-29 PROCEDURE — 82550 ASSAY OF CK (CPK): CPT

## 2024-07-29 PROCEDURE — 85025 COMPLETE CBC W/AUTO DIFF WBC: CPT

## 2024-07-29 PROCEDURE — 86140 C-REACTIVE PROTEIN: CPT

## 2024-07-29 NOTE — PROGRESS NOTES
[facility]  Infectious Diseases OPAT Lab Review Note      Medication: Cefepime 2 grams IV q 12 hours (renal dose) to complete 14 days     Infusion Company: [facility]    Outpatient start date: 7/22/2024  Outpatient stop date: 8/3/2024      Labs reviewed:     Lab Results   Component Value Date    WBC 8.37 07/29/2024    HGB 9.9 (L) 07/29/2024    HCT 30.5 (L) 07/29/2024    MCV 90.2 07/29/2024     (H) 07/29/2024      CMP  Sodium   Date Value Ref Range Status   07/29/2024 138 136 - 145 mmol/L Final     Potassium   Date Value Ref Range Status   07/29/2024 4.8 3.5 - 5.1 mmol/L Final     Chloride   Date Value Ref Range Status   07/29/2024 104 98 - 107 mmol/L Final     CO2   Date Value Ref Range Status   07/29/2024 25 23 - 31 mmol/L Final     Blood Urea Nitrogen   Date Value Ref Range Status   07/29/2024 28.0 (H) 9.8 - 20.1 mg/dL Final     Creatinine   Date Value Ref Range Status   07/29/2024 0.86 0.55 - 1.02 mg/dL Final     Calcium   Date Value Ref Range Status   07/29/2024 9.4 8.4 - 10.2 mg/dL Final     Albumin   Date Value Ref Range Status   07/29/2024 3.2 (L) 3.4 - 4.8 g/dL Final     Bilirubin Total   Date Value Ref Range Status   07/29/2024 0.7 <=1.5 mg/dL Final     ALP   Date Value Ref Range Status   07/29/2024 127 40 - 150 unit/L Final     AST   Date Value Ref Range Status   07/29/2024 16 5 - 34 unit/L Final     ALT   Date Value Ref Range Status   07/29/2024 23 0 - 55 unit/L Final     eGFR   Date Value Ref Range Status   07/29/2024 >60 mL/min/1.73/m2 Final        Lab Results   Component Value Date    SEDRATE 32 (H) 07/29/2024      Lab Results   Component Value Date    CRP 3.30 07/29/2024          Assessment / Plan:   Polymicrobial bacteremia (Pseudomonas aeruginosa and Providencia stuartii)       No leukocytosis. Inflammatory markers decreasing  Continue current treatment  Weekly labs to monitor        Follow up ID appointment: none        RAJESH Cope  [facility]  Infectious Diseases    *Portions of this note dictated using EMR integrated voice recognition software, and may be subject to voice recognition errors not corrected at proofreading. Please contact writer for clarification if needed. *

## 2024-08-01 ENCOUNTER — LAB REQUISITION (OUTPATIENT)
Dept: LAB | Facility: HOSPITAL | Age: 89
End: 2024-08-01
Payer: MEDICARE

## 2024-08-01 DIAGNOSIS — A41.9 SEPSIS, UNSPECIFIED ORGANISM: ICD-10-CM

## 2024-08-01 LAB
ALBUMIN SERPL-MCNC: 3.2 G/DL (ref 3.4–4.8)
ALBUMIN/GLOB SERPL: 0.9 RATIO (ref 1.1–2)
ALP SERPL-CCNC: 116 UNIT/L (ref 40–150)
ALT SERPL-CCNC: 16 UNIT/L (ref 0–55)
ANION GAP SERPL CALC-SCNC: 7 MEQ/L
AST SERPL-CCNC: 16 UNIT/L (ref 5–34)
BILIRUB SERPL-MCNC: 0.7 MG/DL
BUN SERPL-MCNC: 35.9 MG/DL (ref 9.8–20.1)
CALCIUM SERPL-MCNC: 9.9 MG/DL (ref 8.4–10.2)
CHLORIDE SERPL-SCNC: 105 MMOL/L (ref 98–107)
CO2 SERPL-SCNC: 26 MMOL/L (ref 23–31)
CREAT SERPL-MCNC: 0.9 MG/DL (ref 0.55–1.02)
CREAT/UREA NIT SERPL: 40
GFR SERPLBLD CREATININE-BSD FMLA CKD-EPI: >60 ML/MIN/1.73/M2
GLOBULIN SER-MCNC: 3.6 GM/DL (ref 2.4–3.5)
GLUCOSE SERPL-MCNC: 92 MG/DL (ref 82–115)
POTASSIUM SERPL-SCNC: 4.9 MMOL/L (ref 3.5–5.1)
PROT SERPL-MCNC: 6.8 GM/DL (ref 5.8–7.6)
SODIUM SERPL-SCNC: 138 MMOL/L (ref 136–145)

## 2024-08-01 PROCEDURE — 80053 COMPREHEN METABOLIC PANEL: CPT | Performed by: STUDENT IN AN ORGANIZED HEALTH CARE EDUCATION/TRAINING PROGRAM

## 2024-08-02 ENCOUNTER — DOCUMENTATION ONLY (OUTPATIENT)
Dept: INFECTIOUS DISEASES | Facility: HOSPITAL | Age: 89
End: 2024-08-02
Payer: MEDICARE

## 2024-08-02 NOTE — PROGRESS NOTES
[facility]  Infectious Diseases OPAT Lab Review Note      Medication: Cefepime 2 grams IV q 12 hours (renal dose) to complete 14 days     Infusion Company: [facility]    Outpatient start date: 7/22/2024  Outpatient stop date: 8/3/2024      Labs reviewed:     Lab Results   Component Value Date    WBC 8.37 07/29/2024    HGB 9.9 (L) 07/29/2024    HCT 30.5 (L) 07/29/2024    MCV 90.2 07/29/2024     (H) 07/29/2024      CMP  Sodium   Date Value Ref Range Status   08/01/2024 138 136 - 145 mmol/L Final     Potassium   Date Value Ref Range Status   08/01/2024 4.9 3.5 - 5.1 mmol/L Final     Chloride   Date Value Ref Range Status   08/01/2024 105 98 - 107 mmol/L Final     CO2   Date Value Ref Range Status   08/01/2024 26 23 - 31 mmol/L Final     Blood Urea Nitrogen   Date Value Ref Range Status   08/01/2024 35.9 (H) 9.8 - 20.1 mg/dL Final     Creatinine   Date Value Ref Range Status   08/01/2024 0.90 0.55 - 1.02 mg/dL Final     Calcium   Date Value Ref Range Status   08/01/2024 9.9 8.4 - 10.2 mg/dL Final     Albumin   Date Value Ref Range Status   08/01/2024 3.2 (L) 3.4 - 4.8 g/dL Final     Bilirubin Total   Date Value Ref Range Status   08/01/2024 0.7 <=1.5 mg/dL Final     ALP   Date Value Ref Range Status   08/01/2024 116 40 - 150 unit/L Final     AST   Date Value Ref Range Status   08/01/2024 16 5 - 34 unit/L Final     ALT   Date Value Ref Range Status   08/01/2024 16 0 - 55 unit/L Final     eGFR   Date Value Ref Range Status   08/01/2024 >60 mL/min/1.73/m2 Final        Lab Results   Component Value Date    SEDRATE 32 (H) 07/29/2024      Lab Results   Component Value Date    CRP 3.30 07/29/2024          Assessment / Plan:   Polymicrobial bacteremia (Pseudomonas aeruginosa and Providencia stuartii)       No CBC done. CMP within normal limits  Continue current treatment  Weekly labs to monitor        Follow up ID appointment: none        RAJESH Cope  [facility] Infectious  Diseases    *Portions of this note dictated using EMR integrated voice recognition software, and may be subject to voice recognition errors not corrected at proofreading. Please contact writer for clarification if needed. *

## 2024-08-08 ENCOUNTER — OFFICE VISIT (OUTPATIENT)
Dept: FAMILY MEDICINE | Facility: CLINIC | Age: 89
End: 2024-08-08
Payer: MEDICARE

## 2024-08-08 VITALS
HEIGHT: 64 IN | WEIGHT: 193.81 LBS | DIASTOLIC BLOOD PRESSURE: 65 MMHG | BODY MASS INDEX: 33.09 KG/M2 | SYSTOLIC BLOOD PRESSURE: 123 MMHG | OXYGEN SATURATION: 96 % | HEART RATE: 61 BPM | TEMPERATURE: 98 F | RESPIRATION RATE: 20 BRPM

## 2024-08-08 DIAGNOSIS — R78.81 BACTEREMIA: Primary | ICD-10-CM

## 2024-08-08 PROCEDURE — 99214 OFFICE O/P EST MOD 30 MIN: CPT | Mod: PBBFAC

## 2024-09-23 ENCOUNTER — EXTERNAL HOME HEALTH (OUTPATIENT)
Dept: HOME HEALTH SERVICES | Facility: HOSPITAL | Age: 89
End: 2024-09-23
Payer: MEDICARE

## 2024-09-30 NOTE — TELEPHONE ENCOUNTER
Please schedule f/u and ask pt if she needs any refills, and propose, appears she was hospitalized /discharged on 7/23/24. LOV with me on 1/31/2024. Does she have a new primary care provider possibly?

## 2024-10-01 NOTE — TELEPHONE ENCOUNTER
Spoke with patient daughter Deneen Roberto who informed me that patient is in need of med refill,states that patient does not have another PCP has appt scheduled for 11/21/24 with PCP PADMAJA Ribeiro was just recently discharge from [facility].Daughter is inquiring if blood work is needed prior to appt states HH was drawing blood every week prior to discharging her will try to request records,will contact daughter in the AM to see what meds are in need of refill and propose will also attempt to get an earlier appt

## 2024-10-02 RX ORDER — DICLOFENAC SODIUM 50 MG/1
50 TABLET, DELAYED RELEASE ORAL 2 TIMES DAILY PRN
Qty: 45 TABLET | Refills: 0 | Status: SHIPPED | OUTPATIENT
Start: 2024-10-02

## 2024-10-02 RX ORDER — BENAZEPRIL HYDROCHLORIDE 40 MG/1
40 TABLET ORAL DAILY
Qty: 90 TABLET | Refills: 0 | Status: SHIPPED | OUTPATIENT
Start: 2024-10-02 | End: 2025-03-31

## 2024-10-02 RX ORDER — TRIAMTERENE AND HYDROCHLOROTHIAZIDE 37.5; 25 MG/1; MG/1
1 CAPSULE ORAL DAILY
Qty: 90 CAPSULE | Refills: 0 | Status: SHIPPED | OUTPATIENT
Start: 2024-10-02 | End: 2025-03-31

## 2024-10-02 RX ORDER — COLCHICINE 0.6 MG/1
0.6 TABLET ORAL DAILY PRN
Qty: 10 TABLET | Refills: 0 | Status: SHIPPED | OUTPATIENT
Start: 2024-10-02 | End: 2025-10-02

## 2024-10-02 RX ORDER — FAMOTIDINE 20 MG/1
20 TABLET, FILM COATED ORAL NIGHTLY PRN
Qty: 30 TABLET | Refills: 2 | Status: SHIPPED | OUTPATIENT
Start: 2024-10-02

## 2024-10-02 NOTE — TELEPHONE ENCOUNTER
Pharmacy requesting refill for pt's triamterene-hydrochlorothiazide 37.5-25 mg (DYAZIDE) 37.5-25 mg per capsule , famotidine (PEPCID) 20 MG tablet , diclofenac (VOLTAREN) 50 MG EC tablet , benazepriL (LOTENSIN) 40 MG tablet     LOV:1/31/24    NOV: 11/21/24

## 2024-10-02 NOTE — TELEPHONE ENCOUNTER
Yes let me know which meds she needs refilled so I can address?  She already did the labs I had ordered for routine visit but if I need additional labs or urine, I will order at visit/after seeing her. phanx

## 2024-10-02 NOTE — TELEPHONE ENCOUNTER
Meds proposed by Katia daughter is requesting Colchicine in the event she has a flare up before she sees you I proposed it

## 2024-10-15 ENCOUNTER — PATIENT MESSAGE (OUTPATIENT)
Dept: INTERNAL MEDICINE | Facility: CLINIC | Age: 89
End: 2024-10-15
Payer: MEDICARE

## 2024-10-15 RX ORDER — BENAZEPRIL HYDROCHLORIDE 40 MG/1
40 TABLET ORAL DAILY
Qty: 90 TABLET | Refills: 0 | OUTPATIENT
Start: 2024-10-15 | End: 2025-04-13

## 2024-10-28 PROBLEM — A41.9 SEPSIS: Status: RESOLVED | Noted: 2024-07-23 | Resolved: 2024-10-28

## 2024-11-21 ENCOUNTER — TELEPHONE (OUTPATIENT)
Dept: INTERNAL MEDICINE | Facility: CLINIC | Age: 89
End: 2024-11-21

## 2024-11-21 ENCOUNTER — LAB VISIT (OUTPATIENT)
Dept: LAB | Facility: HOSPITAL | Age: 89
End: 2024-11-21
Attending: NURSE PRACTITIONER
Payer: MEDICARE

## 2024-11-21 ENCOUNTER — OFFICE VISIT (OUTPATIENT)
Dept: INTERNAL MEDICINE | Facility: CLINIC | Age: 89
End: 2024-11-21
Payer: MEDICARE

## 2024-11-21 VITALS
HEIGHT: 64 IN | DIASTOLIC BLOOD PRESSURE: 65 MMHG | TEMPERATURE: 98 F | HEART RATE: 60 BPM | SYSTOLIC BLOOD PRESSURE: 133 MMHG | OXYGEN SATURATION: 100 % | BODY MASS INDEX: 33.58 KG/M2 | RESPIRATION RATE: 18 BRPM | WEIGHT: 196.69 LBS

## 2024-11-21 DIAGNOSIS — I10 HYPERTENSION, UNSPECIFIED TYPE: ICD-10-CM

## 2024-11-21 DIAGNOSIS — E78.5 HYPERLIPIDEMIA, UNSPECIFIED HYPERLIPIDEMIA TYPE: ICD-10-CM

## 2024-11-21 DIAGNOSIS — N28.89 RENAL MASS, LEFT: ICD-10-CM

## 2024-11-21 DIAGNOSIS — R21 RASH: ICD-10-CM

## 2024-11-21 DIAGNOSIS — R73.9 HYPERGLYCEMIA: ICD-10-CM

## 2024-11-21 DIAGNOSIS — R78.81 BACTEREMIA: ICD-10-CM

## 2024-11-21 DIAGNOSIS — E55.9 VITAMIN D DEFICIENCY: ICD-10-CM

## 2024-11-21 DIAGNOSIS — M10.9 GOUT, UNSPECIFIED CAUSE, UNSPECIFIED CHRONICITY, UNSPECIFIED SITE: ICD-10-CM

## 2024-11-21 DIAGNOSIS — I10 HYPERTENSION, UNSPECIFIED TYPE: Primary | ICD-10-CM

## 2024-11-21 LAB
25(OH)D3+25(OH)D2 SERPL-MCNC: 35 NG/ML (ref 30–80)
ALBUMIN SERPL-MCNC: 3.9 G/DL (ref 3.4–4.8)
ALBUMIN/GLOB SERPL: 1.1 RATIO (ref 1.1–2)
ALP SERPL-CCNC: 70 UNIT/L (ref 40–150)
ALT SERPL-CCNC: 9 UNIT/L (ref 0–55)
ANION GAP SERPL CALC-SCNC: 6 MEQ/L
AST SERPL-CCNC: 17 UNIT/L (ref 5–34)
BACTERIA #/AREA URNS AUTO: ABNORMAL /HPF
BASOPHILS # BLD AUTO: 0.07 X10(3)/MCL
BASOPHILS NFR BLD AUTO: 1 %
BILIRUB SERPL-MCNC: 0.9 MG/DL
BILIRUB UR QL STRIP.AUTO: NEGATIVE
BUN SERPL-MCNC: 28.3 MG/DL (ref 9.8–20.1)
CALCIUM SERPL-MCNC: 9.5 MG/DL (ref 8.4–10.2)
CHLORIDE SERPL-SCNC: 105 MMOL/L (ref 98–107)
CHOLEST SERPL-MCNC: 190 MG/DL
CHOLEST/HDLC SERPL: 4 {RATIO} (ref 0–5)
CLARITY UR: ABNORMAL
CO2 SERPL-SCNC: 31 MMOL/L (ref 23–31)
COLOR UR AUTO: ABNORMAL
CREAT SERPL-MCNC: 1.05 MG/DL (ref 0.55–1.02)
CREAT/UREA NIT SERPL: 27
EOSINOPHIL # BLD AUTO: 0.73 X10(3)/MCL (ref 0–0.9)
EOSINOPHIL NFR BLD AUTO: 10 %
ERYTHROCYTE [DISTWIDTH] IN BLOOD BY AUTOMATED COUNT: 14 % (ref 11.5–17)
EST. AVERAGE GLUCOSE BLD GHB EST-MCNC: 114 MG/DL
GFR SERPLBLD CREATININE-BSD FMLA CKD-EPI: 52 ML/MIN/1.73/M2
GLOBULIN SER-MCNC: 3.7 GM/DL (ref 2.4–3.5)
GLUCOSE SERPL-MCNC: 95 MG/DL (ref 82–115)
GLUCOSE UR QL STRIP: NORMAL
HBA1C MFR BLD: 5.6 %
HCT VFR BLD AUTO: 35.9 % (ref 37–47)
HDLC SERPL-MCNC: 46 MG/DL (ref 35–60)
HGB BLD-MCNC: 11.2 G/DL (ref 12–16)
HGB UR QL STRIP: ABNORMAL
HYALINE CASTS #/AREA URNS LPF: ABNORMAL /LPF
IMM GRANULOCYTES # BLD AUTO: 0.02 X10(3)/MCL (ref 0–0.04)
IMM GRANULOCYTES NFR BLD AUTO: 0.3 %
KETONES UR QL STRIP: NEGATIVE
LDLC SERPL CALC-MCNC: 110 MG/DL (ref 50–140)
LEUKOCYTE ESTERASE UR QL STRIP: NEGATIVE
LYMPHOCYTES # BLD AUTO: 2.13 X10(3)/MCL (ref 0.6–4.6)
LYMPHOCYTES NFR BLD AUTO: 29.3 %
MCH RBC QN AUTO: 28.7 PG (ref 27–31)
MCHC RBC AUTO-ENTMCNC: 31.2 G/DL (ref 33–36)
MCV RBC AUTO: 92.1 FL (ref 80–94)
MONOCYTES # BLD AUTO: 0.63 X10(3)/MCL (ref 0.1–1.3)
MONOCYTES NFR BLD AUTO: 8.7 %
MUCOUS THREADS URNS QL MICRO: ABNORMAL /LPF
NEUTROPHILS # BLD AUTO: 3.69 X10(3)/MCL (ref 2.1–9.2)
NEUTROPHILS NFR BLD AUTO: 50.7 %
NITRITE UR QL STRIP: NEGATIVE
NRBC BLD AUTO-RTO: 0 %
PH UR STRIP: 7.5 [PH]
PLATELET # BLD AUTO: 352 X10(3)/MCL (ref 130–400)
PMV BLD AUTO: 9.9 FL (ref 7.4–10.4)
POTASSIUM SERPL-SCNC: 3.8 MMOL/L (ref 3.5–5.1)
PROT SERPL-MCNC: 7.6 GM/DL (ref 5.8–7.6)
PROT UR QL STRIP: NEGATIVE
RBC # BLD AUTO: 3.9 X10(6)/MCL (ref 4.2–5.4)
RBC #/AREA URNS AUTO: ABNORMAL /HPF
SODIUM SERPL-SCNC: 142 MMOL/L (ref 136–145)
SP GR UR STRIP.AUTO: 1.01 (ref 1–1.03)
SQUAMOUS #/AREA URNS LPF: ABNORMAL /HPF
TRIGL SERPL-MCNC: 169 MG/DL (ref 37–140)
URATE SERPL-MCNC: 9 MG/DL (ref 2.6–6)
UROBILINOGEN UR STRIP-ACNC: NORMAL
VLDLC SERPL CALC-MCNC: 34 MG/DL
WBC # BLD AUTO: 7.27 X10(3)/MCL (ref 4.5–11.5)
WBC #/AREA URNS AUTO: ABNORMAL /HPF

## 2024-11-21 PROCEDURE — 81015 MICROSCOPIC EXAM OF URINE: CPT

## 2024-11-21 PROCEDURE — 84550 ASSAY OF BLOOD/URIC ACID: CPT

## 2024-11-21 PROCEDURE — 86039 ANTINUCLEAR ANTIBODIES (ANA): CPT

## 2024-11-21 PROCEDURE — 83036 HEMOGLOBIN GLYCOSYLATED A1C: CPT

## 2024-11-21 PROCEDURE — 36415 COLL VENOUS BLD VENIPUNCTURE: CPT

## 2024-11-21 PROCEDURE — 1126F AMNT PAIN NOTED NONE PRSNT: CPT | Mod: CPTII,,, | Performed by: NURSE PRACTITIONER

## 2024-11-21 PROCEDURE — 99214 OFFICE O/P EST MOD 30 MIN: CPT | Mod: PBBFAC | Performed by: NURSE PRACTITIONER

## 2024-11-21 PROCEDURE — 99214 OFFICE O/P EST MOD 30 MIN: CPT | Mod: S$PBB,,, | Performed by: NURSE PRACTITIONER

## 2024-11-21 PROCEDURE — 85025 COMPLETE CBC W/AUTO DIFF WBC: CPT

## 2024-11-21 PROCEDURE — 1159F MED LIST DOCD IN RCRD: CPT | Mod: CPTII,,, | Performed by: NURSE PRACTITIONER

## 2024-11-21 PROCEDURE — 1160F RVW MEDS BY RX/DR IN RCRD: CPT | Mod: CPTII,,, | Performed by: NURSE PRACTITIONER

## 2024-11-21 PROCEDURE — 80061 LIPID PANEL: CPT

## 2024-11-21 PROCEDURE — 82306 VITAMIN D 25 HYDROXY: CPT

## 2024-11-21 PROCEDURE — 1101F PT FALLS ASSESS-DOCD LE1/YR: CPT | Mod: CPTII,,, | Performed by: NURSE PRACTITIONER

## 2024-11-21 PROCEDURE — 3288F FALL RISK ASSESSMENT DOCD: CPT | Mod: CPTII,,, | Performed by: NURSE PRACTITIONER

## 2024-11-21 PROCEDURE — 80053 COMPREHEN METABOLIC PANEL: CPT

## 2024-11-21 RX ORDER — CEFEPIME HYDROCHLORIDE 2 G/1
INJECTION, POWDER, FOR SOLUTION INTRAVENOUS
COMMUNITY
Start: 2024-07-24 | End: 2024-11-21

## 2024-11-21 RX ORDER — BENAZEPRIL HYDROCHLORIDE 40 MG/1
40 TABLET ORAL DAILY
Qty: 90 TABLET | Refills: 1 | Status: SHIPPED | OUTPATIENT
Start: 2024-11-21 | End: 2025-05-20

## 2024-11-21 RX ORDER — AMLODIPINE BESYLATE 10 MG/1
10 TABLET ORAL DAILY
Qty: 90 TABLET | Refills: 1 | Status: SHIPPED | OUTPATIENT
Start: 2024-11-21 | End: 2025-05-20

## 2024-11-21 RX ORDER — TRIAMCINOLONE ACETONIDE 1 MG/G
OINTMENT TOPICAL 2 TIMES DAILY PRN
Qty: 30 G | Refills: 1 | Status: SHIPPED | OUTPATIENT
Start: 2024-11-21

## 2024-11-21 RX ORDER — ALLOPURINOL 300 MG/1
300 TABLET ORAL DAILY
Qty: 90 TABLET | Refills: 1 | Status: SHIPPED | OUTPATIENT
Start: 2024-11-21

## 2024-11-21 RX ORDER — ALLOPURINOL 100 MG/1
200 TABLET ORAL DAILY
Qty: 60 TABLET | Refills: 6 | Status: SHIPPED | OUTPATIENT
Start: 2024-11-21 | End: 2024-11-21

## 2024-11-21 RX ORDER — TRIAMTERENE AND HYDROCHLOROTHIAZIDE 37.5; 25 MG/1; MG/1
1 CAPSULE ORAL DAILY
Qty: 90 CAPSULE | Refills: 1 | Status: SHIPPED | OUTPATIENT
Start: 2024-11-21 | End: 2025-05-20

## 2024-11-21 RX ORDER — SIMVASTATIN 40 MG/1
40 TABLET, FILM COATED ORAL NIGHTLY
Qty: 90 TABLET | Refills: 1 | Status: SHIPPED | OUTPATIENT
Start: 2024-11-21

## 2024-11-21 RX ORDER — DICLOFENAC SODIUM 50 MG/1
50 TABLET, DELAYED RELEASE ORAL 2 TIMES DAILY PRN
Qty: 45 TABLET | Refills: 0 | Status: SHIPPED | OUTPATIENT
Start: 2024-11-21

## 2024-11-21 NOTE — PROGRESS NOTES
[facility]  NHUNG Albarado     Patient Name: Leda Gallardo   : 1935  MRN:[id number]     Chief Complaint     Chief Complaint   Patient presents with    Follow-up    Labs Only     F/u for lab review   C/o rash to lower back         History of Present Illness     89 year old AAF, presents in clinic with granddaughter for routine f/u. Will complete labs today.  Discharged on 2024 after being admitted for bacteremia. Completed 14 day OPAT therapy on 2024. Pseudomonas Aeruginosa & Providencia Stuartii bacteremia were of unknown etiology (possibly left renal cyst).  She is feeling well, denies fever or chills or body aches. Denies cough, SOB, CP, wheezing, edema. Does report an itchy rash to david low back x 2 wks. She is putting neosporin without improvement.     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 Nonsmoker. On diclofenac prn for chronic david foot/toe /ankle pain. Not taking diclofenac currently. Pain to left great toe only with walking. Denies chest pain, shortness of breath at rest, cough, fever, headache, dizziness, weakness, abdominal pain, nausea, vomiting, diarrhea, constipation, edema, dysuria, depression, anxiety.     Following [facility] for bosniak IIF or III lesion in L superior pole.  Asymptomatic. The Urologist recommended refer to [facility] for surgical eval.  She continues to refuse urology referral to [facility].  Surveillance with CT/MRI regardless every 6-12 months.      Family history of primary malignant neoplasm of breast: Mother and Sister  MMG 2019 BIRADS 1; scheduled 2021;no showed and declines MMG in future     ECHO  EF 65%  ECHO  EF 60-65%  2021:coronary angiogram  Coronary stenosis:  discussed the option of CABG versus high risk PCI versus medical management.  Started on Imdur 20 mg p.o. daily; no longer taking. Missed f/u cardio apt and advised to reschedule.        Mild aortic  "stenosis/mild aortic regurgitation per TTE 8.12.20  LVEF -60-65% (TTE 8.12.20)     Abnormal nuclear stress test  Chest pain/DONAHUE  Lexiscan stress test -Apical defect, medium size, moderate intensity, partially reversible (1.27.21)  LVEF-60-65%  (TTE 8.12.20)     CT abd with contrast 5/2021;Enhancing mass again seen involving the posterior aspect of the  superior pole of the left kidney. While this could represent a  neoplasm, it has not changed in size or appearance when compared to  the exam from 08/30/2019.                          Review of Systems     Review of Systems   Constitutional: Negative.    HENT: Negative.     Eyes: Negative.    Respiratory: Negative.     Cardiovascular: Negative.    Gastrointestinal: Negative.    Endocrine: Negative.    Genitourinary: Negative.    Musculoskeletal: Negative.    Integumentary:  Positive for rash.        back   Allergic/Immunologic: Negative.    Neurological: Negative.    Hematological: Negative.    Psychiatric/Behavioral: Negative.     All other systems reviewed and are negative.       Physical Examination     Visit Vitals  /65   Pulse 60   Temp 97.5 °F (36.4 °C) (Oral)   Resp 18   Ht 5' 4" (1.626 m)   Wt 89.2 kg (196 lb 11.2 oz)   SpO2 100%   BMI 33.76 kg/m²        BP Readings from Last 6 Encounters:   11/21/24 133/65   08/08/24 123/65   07/23/24 130/64   03/22/24 (!) 156/69   02/25/24 (!) 168/75   01/31/24 129/60   ]    Wt Readings from Last 6 Encounters:   11/21/24 89.2 kg (196 lb 11.2 oz)   08/08/24 87.9 kg (193 lb 12.8 oz)   07/17/24 89.4 kg (197 lb)   03/22/24 93 kg (205 lb 0.4 oz)   02/25/24 93 kg (205 lb)   01/31/24 90.7 kg (200 lb)   ]    BMI Readings from Last 3 Encounters:   08/08/24 33.27 kg/m²   07/17/24 33.81 kg/m²   03/22/24 35.19 kg/m²         Physical Exam  Vitals and nursing note reviewed.   Constitutional:       Appearance: Normal appearance.   HENT:      Head: Normocephalic and atraumatic.      Right Ear: Tympanic membrane, ear canal and external " "ear normal.      Left Ear: Tympanic membrane, ear canal and external ear normal.      Nose: Nose normal.      Mouth/Throat:      Mouth: Mucous membranes are moist.      Pharynx: Oropharynx is clear.   Eyes:      Extraocular Movements: Extraocular movements intact.      Conjunctiva/sclera: Conjunctivae normal.      Pupils: Pupils are equal, round, and reactive to light.   Cardiovascular:      Rate and Rhythm: Normal rate and regular rhythm.      Pulses: Normal pulses.      Heart sounds: Normal heart sounds.   Pulmonary:      Effort: Pulmonary effort is normal.      Breath sounds: Normal breath sounds.   Abdominal:      General: Abdomen is flat. Bowel sounds are normal.      Palpations: Abdomen is soft.   Musculoskeletal:         General: Normal range of motion.      Cervical back: Normal range of motion and neck supple.   Skin:     General: Skin is warm and dry.      Capillary Refill: Capillary refill takes less than 2 seconds.      Findings: Erythema and rash present.      Comments: Patches of red/purple rash noted to david low back; 2-3 patches noted approx 1cm    Neurological:      General: No focal deficit present.      Mental Status: She is alert and oriented to person, place, and time. Mental status is at baseline.   Psychiatric:         Mood and Affect: Mood normal.         Behavior: Behavior normal.         Thought Content: Thought content normal.         Judgment: Judgment normal.          Labs / Imaging     Chemistry:  Lab Results   Component Value Date     08/01/2024    K 4.9 08/01/2024    BUN 35.9 (H) 08/01/2024    CREATININE 0.90 08/01/2024    EGFRNORACEVR >60 08/01/2024    GLUCOSE 92 08/01/2024    CALCIUM 9.9 08/01/2024    ALKPHOS 116 08/01/2024    LABPROT 6.8 08/01/2024    ALBUMIN 3.2 (L) 08/01/2024    BILIDIR 0.4 05/06/2022    IBILI 0.50 05/06/2022    AST 16 08/01/2024    ALT 16 08/01/2024    MG 2.30 07/17/2024    [id number] 39 07/15/2024        No results found for: "HGBA1C", "MICROALBCREA" "     Hematology:  Lab Results   Component Value Date    WBC 8.37 07/29/2024    RBC 3.38 (L) 07/29/2024    HGB 9.9 (L) 07/29/2024    HCT 30.5 (L) 07/29/2024    MCV 90.2 07/29/2024    MCH 29.3 07/29/2024    MCHC 32.5 (L) 07/29/2024    RDW 14.5 07/29/2024     (H) 07/29/2024    MPV 9.6 07/29/2024        Lipid Panel:  Lab Results   Component Value Date    CHOL 173 07/15/2024    HDL 45 07/15/2024    .00 07/15/2024    TRIG 100 07/15/2024    TOTALCHOLEST 4 07/15/2024        Urine:  Lab Results   Component Value Date    APPEARANCEUA Clear 07/17/2024    SGUA 1.015 07/17/2024    PROTEINUA Trace (A) 07/17/2024    KETONESUA Negative 07/17/2024    LEUKOCYTESUR Negative 07/17/2024    RBCUA 0-5 07/17/2024    WBCUA 0-5 07/17/2024    BACTERIA None Seen 07/17/2024    SQEPUA Trace (A) 07/17/2024    HYALINECASTS None Seen 07/17/2024          Assessment       ICD-10-CM ICD-9-CM   1. Hypertension, unspecified type  I10 401.9   2. Gout, unspecified cause, unspecified chronicity, unspecified site  M10.9 274.9   3. Hyperlipidemia, unspecified hyperlipidemia type  E78.5 272.4   4. Renal mass, left  N28.89 593.9   5. Bacteremia  R78.81 790.7   6. Rash  R21 782.1   7. Vitamin D deficiency  E55.9 268.9   8. Hyperglycemia  R73.9 790.29   9. BMI 33.0-33.9,adult  Z68.33 V85.33        Plan     1. Hypertension, unspecified type  BP and HR stable. Med refills. DASH diet: Eat more fruits, vegetables, and low fat dairy foods.  (Less than 2 grams of sodium per day).  Maintain healthy weight with goal BMI <30.   Exercise 30 minutes per day 5 days per week.  Home medications refilled and continued.   Home BP monitoring encouraged with BP parameters given.    - Urinalysis, Reflex to Urine Culture; Future  - CBC Auto Differential; Future  - Comprehensive Metabolic Panel; Future  - Lipid Panel; Future  - Hemoglobin A1C; Future    2. Gout, unspecified cause, unspecified chronicity, unspecified site  RX allopurinol increased to 300 mg daily.  Uric  acid 9H  Stay well hydrated by increasing water intake throughout the day.  Stressed importance of exercise and weight loss to maintain BMI <30.  Avoid alcohol, sodas, organ/glandular meats (liver, kidney, sweetbreads). Limit seafood and red meat intake.  Eat a balanced diet of fruits, vegetables, complex carbohydrates, and lean sources of protein (boneless/skinless chicken breasts, salmon, lentils, low fat dairy).  Discussed possible benefit of OTC Vitamin C supplementation.    - Uric Acid; Future    3. Hyperlipidemia, unspecified hyperlipidemia type  Lab Results   Component Value Date    .00 07/15/2024    CHOL 173 07/15/2024    HDL 45 07/15/2024    TRIG 100 07/15/2024     Repeat FLP  Cont RX daily; refills given. Take Omega 3 daily.   Stressed importance of dietary modifications. Follow a low cholesterol, low saturated fat diet with less that 200mg of cholesterol a day.  Avoid fried foods and high saturated fats (high saturated fats less than 7% of calories).  Add Flax Seed/Fish Oil supplements to diet. Increase dietary fiber.  Regular exercise can reduce LDL and raise HDL. Stressed importance of physical activity 5 times per week for 30 minutes per day.    - Urinalysis, Reflex to Urine Culture; Future  - CBC Auto Differential; Future  - Comprehensive Metabolic Panel; Future  - Lipid Panel; Future  - Hemoglobin A1C; Future    4. Renal mass, left  CT abd and pelvis 7/2024 while admitted unremarkable    5. Bacteremia  Labs, urine and CXR today  - X-Ray Chest PA And Lateral; Future    6. Rash  RX triamcinolone prn, alternate with neosporin  Notify clinic if not improving  - RADHA IgG by IFA; Future    7. Vitamin D deficiency    - Vitamin D; Future    8. Hyperglycemia    - Hemoglobin A1C; Future    9. BMI 33.0-33.9,adult  Goal BMI <30.  Exercise 5 times a week for 30 minutes per day.  Avoid soda, simple sugars, excessive rice, potatoes or bread. Limit fast foods and fried foods.  Choose complex carbs in  moderation (example: green vegetables, beans, oatmeal). Eat plenty of fresh fruits and vegetables with lean meats daily.  Do not skip meals. Eat a balanced portion size.  Avoid fad diets. Consider permanent healthy life style changes.           Current Outpatient Medications   Medication Instructions    allopurinoL (ZYLOPRIM) 200 mg, Oral, Daily    allopurinoL 200 mg, Oral, Daily    amLODIPine (NORVASC) 10 mg, Oral, Daily    benazepriL (LOTENSIN) 40 mg, Oral, Daily    colchicine (COLCRYS) 0.6 mg, Oral, Daily PRN    diclofenac (VOLTAREN) 50 mg, Oral, 2 times daily PRN    diclofenac sodium (VOLTAREN) 2 g, Topical (Top), 3 times daily PRN    famotidine (PEPCID) 20 mg, Oral, Nightly PRN    magnesium hydroxide (MILK OF MAGNESIA ORAL) Take by mouth.    meclizine (ANTIVERT) 25 mg, Oral, 3 times daily PRN    simvastatin (ZOCOR) 40 mg, Oral, Nightly    triamcinolone acetonide 0.1% (KENALOG) 0.1 % ointment Topical (Top), 2 times daily PRN    triamterene-hydrochlorothiazide 37.5-25 mg (DYAZIDE) 37.5-25 mg per capsule 1 capsule, Oral, Daily       Orders Placed This Encounter   Procedures    X-Ray Chest PA And Lateral    Urinalysis, Reflex to Urine Culture    CBC Auto Differential    Comprehensive Metabolic Panel    Lipid Panel    Hemoglobin A1C    Vitamin D    Uric Acid    RADHA IgG by IFA         Future Appointments   Date Time Provider Department [place]   11/21/2024  9:20 AM LAB, [facility]   3/24/2025  8:20 AM Drea Ribeiro FNP [facility]          Follow up in about 4 months (around 3/21/2025) for fasting labs.    Labs thoroughly reviewed with patient. Medication refills addressed today.  RTC prn and 4 months, with labs 1 week prior to the apt.  COVID 19 precautions given to patient.  Patient voices understanding of all discharge instructions.      NHUNG Albarado

## 2024-11-22 ENCOUNTER — HOSPITAL ENCOUNTER (OUTPATIENT)
Dept: RADIOLOGY | Facility: HOSPITAL | Age: 89
Discharge: HOME | End: 2024-11-22
Attending: NURSE PRACTITIONER
Payer: MEDICARE

## 2024-11-22 DIAGNOSIS — R78.81 BACTEREMIA: ICD-10-CM

## 2024-11-22 LAB
ANA PAT SER IF-IMP: ABNORMAL
ANA SER QL HEP2 SUBST: ABNORMAL
ANA TITR SER HEP2 SUBST: ABNORMAL {TITER}

## 2024-11-22 PROCEDURE — 71046 X-RAY EXAM CHEST 2 VIEWS: CPT | Mod: TC

## 2024-11-25 DIAGNOSIS — M25.572 CHRONIC PAIN OF LEFT ANKLE: ICD-10-CM

## 2024-11-25 DIAGNOSIS — R21 RASH: ICD-10-CM

## 2024-11-25 DIAGNOSIS — G89.29 CHRONIC PAIN OF LEFT ANKLE: ICD-10-CM

## 2024-11-25 DIAGNOSIS — R76.8 POSITIVE ANA (ANTINUCLEAR ANTIBODY): ICD-10-CM

## 2024-11-26 ENCOUNTER — TELEPHONE (OUTPATIENT)
Dept: INTERNAL MEDICINE | Facility: CLINIC | Age: 89
End: 2024-11-26
Payer: MEDICARE

## 2024-11-26 NOTE — TELEPHONE ENCOUNTER
----- Message from NHUNG Castellanos sent at 11/25/2024  4:21 PM CST -----  Please inform pt and/or daughter that I have referred her to see a specialist, a Rheumatologist for further work up of positive RADHA, autoimmune disease testing. A positive RADHA test can indicate the presence of an autoimmune disease, such as systemic lupus erythematosus (SLE), or other inflammatory conditions.  However, a positive RADHA test doesn't necessarily mean you have an autoimmune disease. Many people with no disease have positive RADHA tests, especially women over 65.

## 2024-11-26 NOTE — TELEPHONE ENCOUNTER
----- Message from NHUNG Castellanos sent at 11/21/2024  5:20 PM CST -----  Please inform pt that there were no acute findings on her lab and urine test. Kidney functions were slightly decreased, and she should be encouraged to stay hydrated daily with water. Also, she did not complete her CXR, please ask her to come tomorrow or next week to complete.

## 2024-11-27 ENCOUNTER — TELEPHONE (OUTPATIENT)
Dept: INTERNAL MEDICINE | Facility: CLINIC | Age: 89
End: 2024-11-27
Payer: MEDICARE

## 2024-11-27 NOTE — TELEPHONE ENCOUNTER
Patient's daughter informed that there were no acute findings on her lab and urine test. Kidney functions were slightly decreased, and her mother should be encouraged to stay hydrated daily with water. Also informed of no acute findings on CXR,daughter stated seen CXR results on the portal.Acknowledged understanding

## 2024-11-27 NOTE — TELEPHONE ENCOUNTER
Patient's  daughter informed that PCP referred her to see a specialist, a Rheumatologist for further work up of positive RADHA, autoimmune disease testing. A positive RADHA test can indicate the presence of an autoimmune disease, such as systemic lupus erythematosus (SLE), or other inflammatory conditions.   However, a positive RADHA test doesn't necessarily mean you have an autoimmune disease. Many people with no disease have positive RADHA tests, especially women over 65.   Acknowledged understanding

## 2024-12-15 DIAGNOSIS — G89.29 CHRONIC PAIN OF LEFT ANKLE: Primary | ICD-10-CM

## 2024-12-15 DIAGNOSIS — M25.572 CHRONIC PAIN OF LEFT ANKLE: Primary | ICD-10-CM

## 2024-12-16 RX ORDER — DICLOFENAC SODIUM 50 MG/1
50 TABLET, DELAYED RELEASE ORAL 2 TIMES DAILY PRN
Qty: 45 TABLET | Refills: 0 | Status: SHIPPED | OUTPATIENT
Start: 2024-12-16

## 2025-01-02 RX ORDER — FAMOTIDINE 20 MG/1
TABLET, FILM COATED ORAL
Qty: 30 TABLET | Refills: 0 | Status: SHIPPED | OUTPATIENT
Start: 2025-01-02

## 2025-01-02 NOTE — TELEPHONE ENCOUNTER
Pharmacy requesting refill for pt's  famotidine (PEPCID) 20 MG tablet     LOV: 11/21/24    NOV:3/24/25

## 2025-01-30 RX ORDER — AMLODIPINE BESYLATE 10 MG/1
10 TABLET ORAL DAILY
Qty: 90 TABLET | Refills: 1 | Status: SHIPPED | OUTPATIENT
Start: 2025-01-30 | End: 2025-07-29

## 2025-01-30 RX ORDER — ALLOPURINOL 300 MG/1
300 TABLET ORAL DAILY
Qty: 90 TABLET | Refills: 1 | OUTPATIENT
Start: 2025-01-30

## 2025-01-30 NOTE — TELEPHONE ENCOUNTER
Called pt's pharmacy . Informed pt has refills for her amlodipine and allopurinol. Rx refill request too soon .

## 2025-03-24 ENCOUNTER — OFFICE VISIT (OUTPATIENT)
Dept: INTERNAL MEDICINE | Facility: CLINIC | Age: 89
End: 2025-03-24
Payer: MEDICARE

## 2025-03-24 VITALS
SYSTOLIC BLOOD PRESSURE: 133 MMHG | WEIGHT: 200 LBS | BODY MASS INDEX: 34.15 KG/M2 | RESPIRATION RATE: 16 BRPM | HEIGHT: 64 IN | OXYGEN SATURATION: 97 % | DIASTOLIC BLOOD PRESSURE: 62 MMHG | TEMPERATURE: 98 F | HEART RATE: 56 BPM

## 2025-03-24 DIAGNOSIS — E55.9 VITAMIN D DEFICIENCY: ICD-10-CM

## 2025-03-24 DIAGNOSIS — M10.9 GOUT, UNSPECIFIED CAUSE, UNSPECIFIED CHRONICITY, UNSPECIFIED SITE: ICD-10-CM

## 2025-03-24 DIAGNOSIS — R06.09 DOE (DYSPNEA ON EXERTION): ICD-10-CM

## 2025-03-24 DIAGNOSIS — N18.30 STAGE 3 CHRONIC KIDNEY DISEASE, UNSPECIFIED WHETHER STAGE 3A OR 3B CKD: ICD-10-CM

## 2025-03-24 DIAGNOSIS — E78.5 HYPERLIPIDEMIA, UNSPECIFIED HYPERLIPIDEMIA TYPE: ICD-10-CM

## 2025-03-24 DIAGNOSIS — I10 HYPERTENSION, UNSPECIFIED TYPE: ICD-10-CM

## 2025-03-24 PROCEDURE — 1160F RVW MEDS BY RX/DR IN RCRD: CPT | Mod: CPTII,,, | Performed by: NURSE PRACTITIONER

## 2025-03-24 PROCEDURE — 3288F FALL RISK ASSESSMENT DOCD: CPT | Mod: CPTII,,, | Performed by: NURSE PRACTITIONER

## 2025-03-24 PROCEDURE — 1126F AMNT PAIN NOTED NONE PRSNT: CPT | Mod: CPTII,,, | Performed by: NURSE PRACTITIONER

## 2025-03-24 PROCEDURE — 1159F MED LIST DOCD IN RCRD: CPT | Mod: CPTII,,, | Performed by: NURSE PRACTITIONER

## 2025-03-24 PROCEDURE — 1101F PT FALLS ASSESS-DOCD LE1/YR: CPT | Mod: CPTII,,, | Performed by: NURSE PRACTITIONER

## 2025-03-24 PROCEDURE — 99214 OFFICE O/P EST MOD 30 MIN: CPT | Mod: PBBFAC | Performed by: NURSE PRACTITIONER

## 2025-03-24 PROCEDURE — 99214 OFFICE O/P EST MOD 30 MIN: CPT | Mod: S$PBB,,, | Performed by: NURSE PRACTITIONER

## 2025-03-24 RX ORDER — AMLODIPINE BESYLATE 10 MG/1
10 TABLET ORAL DAILY
Qty: 90 TABLET | Refills: 1 | Status: SHIPPED | OUTPATIENT
Start: 2025-03-24 | End: 2025-09-20

## 2025-03-24 RX ORDER — ASPIRIN 81 MG/1
81 TABLET ORAL DAILY
COMMUNITY

## 2025-03-24 RX ORDER — FAMOTIDINE 20 MG/1
20 TABLET, FILM COATED ORAL DAILY PRN
Qty: 30 TABLET | Refills: 2 | Status: SHIPPED | OUTPATIENT
Start: 2025-03-24

## 2025-03-24 RX ORDER — ALLOPURINOL 200 MG/1
200 TABLET ORAL DAILY
Qty: 90 TABLET | Refills: 1 | Status: SHIPPED | OUTPATIENT
Start: 2025-03-24

## 2025-03-24 RX ORDER — SIMVASTATIN 40 MG/1
40 TABLET, FILM COATED ORAL NIGHTLY
Qty: 90 TABLET | Refills: 1 | Status: SHIPPED | OUTPATIENT
Start: 2025-03-24

## 2025-03-24 RX ORDER — ALLOPURINOL 100 MG/1
200 TABLET ORAL
COMMUNITY
Start: 2024-11-21 | End: 2025-03-24 | Stop reason: SDUPTHER

## 2025-03-24 RX ORDER — BENAZEPRIL HYDROCHLORIDE 40 MG/1
40 TABLET ORAL DAILY
Qty: 90 TABLET | Refills: 1 | Status: SHIPPED | OUTPATIENT
Start: 2025-03-24 | End: 2025-09-20

## 2025-03-24 RX ORDER — TRIAMTERENE AND HYDROCHLOROTHIAZIDE 37.5; 25 MG/1; MG/1
1 CAPSULE ORAL DAILY
Qty: 90 CAPSULE | Refills: 1 | Status: SHIPPED | OUTPATIENT
Start: 2025-03-24 | End: 2025-09-20

## 2025-03-24 NOTE — PROGRESS NOTES
[facility]  [facility]     Patient Name: Leda Gallardo   : 1935  MRN:[id number]     Chief Complaint     Chief Complaint   Patient presents with    Hypertension        History of Present Illness     89 year old AAF, presents in clinic alone for routine lab f/u. Labs completed today. No acute complaints voiced. BP normal, HR low at 56, previously noted to be in the 60s. O2 97 percent. Reports DONAHUE x2-3 yrs; States intermittent CP, but is relieved with pepcid. She is feeling well overall, denies fever or chills or body aches. Denies cough, SOB, CP, wheezing, edema. She is not taking allopurinol daily as recommended for gout, takes maybe once or twice a week. GFR decreased today at 45. States drinks about 2, 8oz cups of water daily.  Discharged on 2024 after being admitted for bacteremia. Completed 14 day OPAT therapy on 2024. Pseudomonas Aeruginosa & Providencia Stuartii bacteremia were of unknown etiology (possibly left renal cyst). Needs to reschedule with Urology clinic.       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 Nonsmoker. On diclofenac prn for chronic david foot/toe /ankle pain. Not taking diclofenac currently. Pain to left great toe only with walking. Denies chest pain, shortness of breath at rest, cough, fever, headache, dizziness, weakness, abdominal pain, nausea, vomiting, diarrhea, constipation, edema, dysuria, depression, anxiety.     Following [facility] UROLOGY for bosniak IIF or III lesion in L superior pole.  Asymptomatic. The Urologist recommended refer to [facility] for surgical eval.  She continues to refuse urology referral to [facility].  Surveillance with CT/MRI regardless every 6-12 months.      Family history of primary malignant neoplasm of breast: Mother and Sister  MMG 2019 BIRADS 1; scheduled 2021;no showed and declines MMG in future     ECHO  EF 65%  ECHO  EF 60-65%  ECHO 24 EF  "69%    6/11/2021:coronary angiogram  Coronary stenosis:  discussed the option of CABG versus high risk PCI versus medical management.  Started on Imdur 20 mg p.o. daily; no longer taking. Missed f/u cardio apt and advised to reschedule.        Mild aortic stenosis/mild aortic regurgitation per TTE 8.12.20  LVEF -60-65% (TTE 8.12.20)     Abnormal nuclear stress test  Chest pain/DONAHUE  Lexiscan stress test -Apical defect, medium size, moderate intensity, partially reversible (1.27.21)  LVEF-60-65%  (TTE 8.12.20)     CT abd with contrast 5/2021;Enhancing mass again seen involving the posterior aspect of the  superior pole of the left kidney. While this could represent a  neoplasm, it has not changed in size or appearance when compared to  the exam from 08/30/2019.                      Review of Systems     Review of Systems   Constitutional: Negative.    HENT: Negative.     Eyes: Negative.    Respiratory: Negative.          DONAHUE   Cardiovascular:  Positive for leg swelling.   Gastrointestinal: Negative.    Endocrine: Negative.    Genitourinary: Negative.    Musculoskeletal: Negative.    Integumentary:  Negative.   Allergic/Immunologic: Negative.    Neurological: Negative.    Hematological: Negative.    Psychiatric/Behavioral: Negative.     All other systems reviewed and are negative.       Physical Examination     Visit Vitals  /62 (BP Location: Left arm, Patient Position: Sitting)   Pulse (!) 56   Temp 97.7 °F (36.5 °C) (Oral)   Resp 16   Ht 5' 4" (1.626 m)   Wt 90.7 kg (200 lb)   SpO2 97%   BMI 34.33 kg/m²        BP Readings from Last 6 Encounters:   03/24/25 133/62   11/21/24 133/65   08/08/24 123/65   07/23/24 130/64   03/22/24 (!) 156/69   02/25/24 (!) 168/75   ]    Wt Readings from Last 6 Encounters:   03/24/25 90.7 kg (200 lb)   11/21/24 89.2 kg (196 lb 11.2 oz)   08/08/24 87.9 kg (193 lb 12.8 oz)   07/17/24 89.4 kg (197 lb)   03/22/24 93 kg (205 lb 0.4 oz)   02/25/24 93 kg (205 lb)   ]    BMI Readings from " Last 3 Encounters:   11/21/24 33.76 kg/m²   08/08/24 33.27 kg/m²   07/17/24 33.81 kg/m²         Physical Exam  Vitals and nursing note reviewed.   Constitutional:       Appearance: Normal appearance. She is obese.   HENT:      Head: Normocephalic and atraumatic.      Right Ear: Tympanic membrane, ear canal and external ear normal.      Left Ear: Tympanic membrane, ear canal and external ear normal.      Nose: Nose normal.      Mouth/Throat:      Mouth: Mucous membranes are moist.      Pharynx: Oropharynx is clear.   Eyes:      Extraocular Movements: Extraocular movements intact.      Conjunctiva/sclera: Conjunctivae normal.      Pupils: Pupils are equal, round, and reactive to light.   Cardiovascular:      Rate and Rhythm: Normal rate and regular rhythm.      Pulses: Normal pulses.      Heart sounds: Normal heart sounds.   Pulmonary:      Effort: Pulmonary effort is normal.      Breath sounds: Normal breath sounds.   Abdominal:      General: Abdomen is flat. Bowel sounds are normal.      Palpations: Abdomen is soft.   Musculoskeletal:         General: Swelling present. Normal range of motion.      Cervical back: Normal range of motion and neck supple.      Comments: +1 david lower ext edema   Skin:     General: Skin is warm and dry.      Capillary Refill: Capillary refill takes less than 2 seconds.   Neurological:      General: No focal deficit present.      Mental Status: She is alert and oriented to person, place, and time. Mental status is at baseline.   Psychiatric:         Mood and Affect: Mood normal.         Behavior: Behavior normal.         Thought Content: Thought content normal.         Judgment: Judgment normal.          Labs / Imaging     Chemistry:  Lab Results   Component Value Date     03/24/2025    K 3.9 03/24/2025    BUN 33.0 (H) 03/24/2025    CREATININE 1.18 (H) 03/24/2025    EGFRNORACEVR 45 03/24/2025    GLUCOSE 99 03/24/2025    CALCIUM 9.0 03/24/2025    ALKPHOS 67 03/24/2025    LABPROT 7.3  03/24/2025    ALBUMIN 3.6 03/24/2025    BILIDIR 0.4 05/06/2022    IBILI 0.50 05/06/2022    AST 16 03/24/2025    ALT 12 03/24/2025    MG 2.30 07/17/2024    [id number] 35 11/21/2024        Lab Results   Component Value Date    HGBA1C 5.6 11/21/2024        Hematology:  Lab Results   Component Value Date    WBC 8.75 03/24/2025    RBC 3.47 (L) 03/24/2025    HGB 10.0 (L) 03/24/2025    HCT 31.5 (L) 03/24/2025    MCV 90.8 03/24/2025    MCH 28.8 03/24/2025    MCHC 31.7 (L) 03/24/2025    RDW 14.1 03/24/2025     03/24/2025    MPV 9.7 03/24/2025        Lipid Panel:  Lab Results   Component Value Date    CHOL 180 03/24/2025    HDL 44 03/24/2025    .00 03/24/2025    TRIG 125 03/24/2025    TOTALCHOLEST 4 03/24/2025        Urine:  Lab Results   Component Value Date    APPEARANCEUA Clear 03/24/2025    SGUA 1.014 03/24/2025    PROTEINUA Negative 03/24/2025    KETONESUA Negative 03/24/2025    LEUKOCYTESUR Negative 03/24/2025    RBCUA 0-5 03/24/2025    WBCUA 0-5 03/24/2025    BACTERIA Trace (A) 03/24/2025    SQEPUA Few (A) 03/24/2025    HYALINECASTS None Seen 03/24/2025          Assessment       ICD-10-CM ICD-9-CM   1. Hypertension, unspecified type  I10 401.9   2. Hyperlipidemia, unspecified hyperlipidemia type  E78.5 272.4   3. Vitamin D deficiency  E55.9 268.9   4. DONAHUE (dyspnea on exertion)  R06.09 786.09   5. Stage 3 chronic kidney disease, unspecified whether stage 3a or 3b CKD  N18.30 585.3   6. Gout, unspecified cause, unspecified chronicity, unspecified site  M10.9 274.9   7. BMI 34.0-34.9,adult  Z68.34 V85.34        Plan     1. Hypertension, unspecified type  BP and HR stable. Med refills. DASH diet: Eat more fruits, vegetables, and low fat dairy foods.  (Less than 2 grams of sodium per day).  Maintain healthy weight with goal BMI <30.   Exercise 30 minutes per day 5 days per week.  Home medications refilled and continued.   Home BP monitoring encouraged with BP parameters given.      2. Hyperlipidemia,  unspecified hyperlipidemia type  Lab Results   Component Value Date    .00 03/24/2025    CHOL 180 03/24/2025    HDL 44 03/24/2025    TRIG 125 03/24/2025       Cont RX daily; refills given. Take OTC Fish oil/Dos Rios 3/DHA EPA daily.   Stressed importance of dietary modifications. Follow a low cholesterol, low saturated fat diet with less that 200mg of cholesterol a day.  Avoid fried foods and high saturated fats (high saturated fats less than 7% of calories).  Add Flax Seed/Fish Oil supplements to diet. Increase dietary fiber.  Regular exercise can reduce LDL and raise HDL. Stressed importance of physical activity 5 times per week for 30 minutes per day.      3. Vitamin D deficiency  Continue OTC Vitamin D3 2000 IU daily.     4. DONAHUE (dyspnea on exertion)  HR 56  ECHO reviewed.  - Ambulatory referral/consult to Cardiology; Future    5. Stage 3 chronic kidney disease, unspecified whether stage 3a or 3b CKD  Lab Results   Component Value Date    EGFRNORACEVR 45 03/24/2025    EGFRNORACEVR 52 11/21/2024     Lab Results   Component Value Date    BUN 33.0 (H) 03/24/2025     Lab Results   Component Value Date    CREATININE 1.18 (H) 03/24/2025     Reschedule with Urology  Follow renoprotective measures including Renal Diet (reduce intake of nuts, peanut butter, milk, cheese, dried beans, peas) and Low Sodium Diet (less than 2 grams per day).  Avoid NSAIDs (Aleve, Mobic, Celebrex, Ibuprofen, Advil, Toradol and Diclofenac). May take Tylenol as needed for headache/pain.  Control DM with goal A1C <7. BP goal <130/80. LDL goal < 100.  Stay well hydrated. Avoid alcohol and soda. Limit tea and coffee.  Smoking Cessation recommended.   - Urinalysis, Reflex to Urine Culture; Future  - Comprehensive Metabolic Panel; Future  - CBC Auto Differential; Future    6. Gout, unspecified cause, unspecified chronicity, unspecified site  Lab Results   Component Value Date    LABURIC 9.0 (H) 11/21/2024       RX allopurinol 200 daily, (she is  taking prn only)  Instructions given to granddaughter over the phone  Stay well hydrated by increasing water intake throughout the day.  Stressed importance of exercise and weight loss to maintain BMI <30.  Avoid alcohol, sodas, organ/glandular meats (liver, kidney, sweetbreads). Limit seafood and red meat intake.  Eat a balanced diet of fruits, vegetables, complex carbohydrates, and lean sources of protein (boneless/skinless chicken breasts, salmon, lentils, low fat dairy).  Discussed possible benefit of OTC Vitamin C supplementation.    - Uric Acid; Future    7. BMI 34.0-34.9,adult  Goal BMI <30.  Exercise 5 times a week for 30 minutes per day.  Avoid soda, simple sugars, excessive rice, potatoes or bread. Limit fast foods and fried foods.  Choose complex carbs in moderation (example: green vegetables, beans, oatmeal). Eat plenty of fresh fruits and vegetables with lean meats daily.  Do not skip meals. Eat a balanced portion size.  Avoid fad diets. Consider permanent healthy life style changes.           Current Outpatient Medications   Medication Instructions    allopurinoL 200 mg, Oral, Daily    amLODIPine (NORVASC) 10 mg, Oral, Daily    aspirin (ECOTRIN) 81 mg, Daily    benazepriL (LOTENSIN) 40 mg, Oral, Daily    colchicine (COLCRYS) 0.6 mg, Oral, Daily PRN    diclofenac (VOLTAREN) 50 mg, Oral, 2 times daily PRN    diclofenac sodium (VOLTAREN) 2 g, Topical (Top), 3 times daily PRN    famotidine (PEPCID) 20 mg, Oral, Daily PRN    magnesium hydroxide (MILK OF MAGNESIA ORAL) Take by mouth.    meclizine (ANTIVERT) 25 mg, Oral, 3 times daily PRN    simvastatin (ZOCOR) 40 mg, Oral, Nightly    triamcinolone acetonide 0.1% (KENALOG) 0.1 % ointment Topical (Top), 2 times daily PRN    triamterene-hydrochlorothiazide 37.5-25 mg (DYAZIDE) 37.5-25 mg per capsule 1 capsule, Oral, Daily       Orders Placed This Encounter   Procedures    Urinalysis, Reflex to Urine Culture    Comprehensive Metabolic Panel    CBC Auto  Differential    Uric Acid    Ambulatory referral/consult to Cardiology         Future Appointments   Date Time Provider Department Center   4/10/2025  8:00 AM Berenice Shultz FNP [facility]    7/2/2025  8:20 AM Drea Ribeiro FNP [facility]         Follow up in about 3 months (around 6/24/2025) for lab review.    Labs thoroughly reviewed with patient. Medication refills addressed today.  RTC prn and 3 months, with labs 1 week prior to the apt.  COVID 19 precautions given to patient.  Patient voices understanding of all discharge instructions.      [facility] Albarado

## 2025-04-09 NOTE — PROGRESS NOTES
"  Patient ID: [id number]     Chief Complaint: Positive RADHA (Patient is here for abnormal labs she is having generalized aches is her complaints. She also was dx with gout - she has occasional flares she is currently taking allopurinoL daily and colchicine as ordered. )      Referred By: Drea Ribeiro     HPI:     Leda Gallardo is a 89 y.o. female here today for a new patient visit +RADHA.      Presents today for evaluation of +RADHA, labs with PCP November 2024 revealed RADHA 1:320 speckled pattern.  No further autoimmune workup labs completed at this time. At this time she did have a rash and labs were run- she was given a topical and resolved. She does report occ pain to her shoulders- comes and goes, feels like a "little pinch" then resolves- denies injury that she can recall, denies any other joints bothering her. She denies red/warm/swollen joints. Will have some swelling in her ankles at the end of the day and resolves when she wakes up the following am (she is also on Norvasc). Denies rashes with the exception of initial rash noted in November which resolved- never returned and no issues prior to this. Denies morning stiffness. She was dx with Gout/elevated Uric acid and taking Allopurinol as directed with PCP- daughter reports was taken prn but recent adjustments made and reports has been taking as directed.     7/23/2024 Admitted for bacteremia. Completed 14 day OPAT therapy on 8/4/2024. Pseudomonas Aeruginosa & Providencia Stuartii bacteremia were of unknown etiology (possibly left renal cyst). Also had a Pseudomonas bacteremia in 2022.     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  She continues to refuse urology referral to [facility].  Surveillance with CT/MRI regardless every 6-12 months.     Cardiology: Coronary stenosis:  discussed the option of CABG versus high risk PCI versus medical management.  Started on Imdur 20 mg p.o. daily; no " longer taking. Missed f/u cardio apt and rescheduled- has apt June 2025.    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   Family history of autoimmune disease: None  Pregnancies: 11 Miscarriages: 1, stillbirth (at the age of 21 years old)  Smoking:  Life long nonsmoker     -------------------------------------    Essential (primary) hypertension    Gout, unspecified    Heart disease        Past Surgical History:   Procedure Laterality Date    HYSTERECTOMY      LEFT HEART CATHETERIZATION  06/11/2021       Review of patient's allergies indicates:  No Known Allergies    Current Outpatient Medications   Medication Instructions    allopurinoL 200 mg, Oral, Daily    amLODIPine (NORVASC) 10 mg, Oral, Daily    aspirin (ECOTRIN) 81 mg, Daily    benazepriL (LOTENSIN) 40 mg, Oral, Daily    colchicine (COLCRYS) 0.6 mg, Oral, Daily PRN    diclofenac (VOLTAREN) 50 mg, Oral, 2 times daily PRN    diclofenac sodium (VOLTAREN) 2 g, Topical (Top), 3 times daily PRN    famotidine (PEPCID) 20 mg, Oral, Daily PRN    magnesium hydroxide (MILK OF MAGNESIA ORAL) Take by mouth.    simvastatin (ZOCOR) 40 mg, Oral, Nightly    triamcinolone acetonide 0.1% (KENALOG) 0.1 % ointment Topical (Top), 2 times daily PRN    triamterene-hydrochlorothiazide 37.5-25 mg (DYAZIDE) 37.5-25 mg per capsule 1 capsule, Oral, Daily       Social History[1]     Family History   Problem Relation Name Age of Onset    Breast cancer Mother      Breast cancer Sister          Immunization History   Administered Date(s) Administered    COVID-19, MRNA, LN-S, PF (Pfizer) (Purple Cap) 04/07/2021, 04/28/2021    Influenza (FLUAD) - Quadrivalent - Adjuvanted - PF *Preferred* (65+) 01/23/2023, 01/31/2024    Influenza -  Quadrivalent - High Dose - PF (65 years and older) 01/27/2022    Influenza - Trivalent - Fluzone High Dose - PF (65 years and older) 09/30/2019    Pneumococcal Polysaccharide - 23 Valent 06/04/2018       Patient Care Team:  Drea Ribeiro FNP as PCP - General (Nurse Practitioner)     Subjective:     ROS    Constitutional:  Denies chills. Denies fever. Denies night sweats. Denies weight loss. Denies sleep disturbance.   Ophthalmology: Denies blurred vision. Denies diminished visual acuity. Denies dry eyes. Denies eye pain. Denies Itching and redness.   ENT: Admits decreased hearing. Denies oral ulcers. Denies epistaxis. Denies swelling of ears or throat. Admits dry mouth- admits to not drinking water like she should. Denies swollen glands. Denies hair loss.   Endocrine: Denies diabetes. Denies thyroid Problems.   Respiratory: Denies cough. Denies shortness of breath. Admits shortness of breath with exertion- following with Cardiology. Denies hemoptysis.   Cardiovascular: Denies chest pain at rest. Denies chest pain with exertion. Denies Irregular heartbeat. Denies palpitations. Denies edema. Denies orthopnea.   Gastrointestinal: Denies abdominal pain. Denies diarrhea. Denies nausea. Denies vomiting. Denies hematemesis or hematochezia. Denies change in appetite. Denies heartburn.  Genitourinary: Denies dysuria. Denies urinary frequency. Denies urinary urgency. Denies blood in urine.  Musculoskeletal: See HPI for details  Integumentary: Denies rash. Denies Itching. Denies dry skin. Denies photosensitivity.   Peripheral Vascular: Denies Ulcers of hands and/or feet. Denies Cold extremities.   Neurologic: Denies dizziness. Denies headache. Denies difficulty speaking. Denies loss of strength. Denies numbness or tingling.   Psychiatric: Denies depression. Denies anxiety. Denies suicidal/homicidal ideations.      Objective:     Visit Vitals  /68 (BP Location: Left arm, Patient Position: Sitting)   Pulse 81   Temp  "97.6 °F (36.4 °C) (Oral)   Resp 18   Ht 5' 4" (1.626 m)   Wt 90.3 kg (199 lb)   SpO2 97%   BMI 34.16 kg/m²       Physical Exam    General Appearance: alert, pleasant, in no acute distress.  Skin: Skin color, texture, turgor normal. No rashes or lesions.  Eyes:  extraocular movement intact (EOMI), pupils equal, round, reactive to light and accommodation, conjunctiva clear.  ENT: No oral or nasal ulcers.   Neck:  Neck supple. No adenopathy.   Lungs: CTA bilaterally without crackles, rhonchi, or wheezes.   Heart: RRR -systolic murmur noted.  + edema- nonpitting. 2+ DP pulse.  Abdomen: Soft, non-tender, no masses, rebound or guarding.  Neuro: Alert, oriented, CN II-XII GI, sensory and motor innervation intact.  Musculoskeletal: No active synovitis noted to bilateral MCPs/PIPs/DIPs, FROM noted to bilateral wrists, elbows and shoulders with no pain, FROM noted to bilateral knees, no pain- mild crepitus bilaterally, no effusions noted, no pain to bilateral ankles or MTPs- bilateral pes plans noted, questionable plantar wart to plantar aspect of left foot, discolored toe nails bilaterally, - SLE/Bashir bilaterally, good ROM to both spine and hips. No soft tissue tender points noted on exam.  Psych: Alert, oriented, normal eye contact.       Labs Reviewed:   11/21/2024 RADHA 1:320 speckled pattern    03/24/2025 CBC hemoglobin 10, hematocrit 31.5, eosinophils 1.22 (0-0.9), CMP creatinine 1.18, BUN 33, GFR 45, TSH 3.63 , urine no protein,+1 blood noted    Rheumatology:  Lab Results   Component Value Date    ANAHS Positive 1:320 (A) 11/21/2024    ANATIT 1:320 11/21/2024    ANAPAT Speckled 11/21/2024    SEDRATE 32 (H) 07/29/2024    LABURIC 9.0 (H) 11/21/2024        Chemistry:  Lab Results   Component Value Date     03/24/2025    K 3.9 03/24/2025    BUN 33.0 (H) 03/24/2025    CREATININE 1.18 (H) 03/24/2025    EGFRNORACEVR 45 03/24/2025    GLUCOSE 99 03/24/2025    CALCIUM 9.0 03/24/2025    ALKPHOS 67 03/24/2025    LABPROT 7.3 " "03/24/2025    ALBUMIN 3.6 03/24/2025    BILIDIR 0.4 05/06/2022    IBILI 0.50 05/06/2022    AST 16 03/24/2025    ALT 12 03/24/2025    MG 2.30 07/17/2024    [id number] 35 11/21/2024        Hematology:  Lab Results   Component Value Date    WBC 8.75 03/24/2025    HGB 10.0 (L) 03/24/2025    HCT 31.5 (L) 03/24/2025     03/24/2025        Urine:  Lab Results   Component Value Date    APPEARANCEUA Clear 03/24/2025    SGUA 1.014 03/24/2025    PROTEINUA Negative 03/24/2025    KETONESUA Negative 03/24/2025    LEUKOCYTESUR Negative 03/24/2025    RBCUA 0-5 03/24/2025    WBCUA 0-5 03/24/2025    BACTERIA Trace (A) 03/24/2025    SQEPUA Few (A) 03/24/2025    HYALINECASTS None Seen 03/24/2025        No results found for: "PROTEINURINE", "CREATRANDUR", "UPROTCREA"     Imaging:   CT abd with contrast 5/2021;Enhancing mass again seen involving the posterior aspect of the  superior pole of the left kidney. While this could represent a neoplasm, it has not changed in size or appearance when compared to the exam from 08/30/2019.    07/17/2024 CT abdomen/pelvis with and without contrast: No acute process.    07/17/2024 ECHO: LVEF 69%, PASP 37 mm Hg    07/18/2024 chest x-ray: Impression:  Cardiomegaly and mild congestive changes.    11/22/2024 chest x-ray: Impression: No acute chest disease is identified.      Assessment:       ICD-10-CM ICD-9-CM   1. Positive RADHA (antinuclear antibody)  R76.8 795.79   2. Hypertension, unspecified type  I10 401.9   3. Renal mass  N28.89 593.9   4. Gout, unspecified cause, unspecified chronicity, unspecified site  M10.9 274.9        Plan:     1. Positive RADHA (antinuclear antibody) (Primary)  Very pleasant 89 year old female who presents today for evaluation of +RADHA with her daughter Deneen. Labs with PCP in November 2024 revealed RADHA 1:320 speckled pattern, no further autoimmune work up completed at this time. She denies overall joint pain, occ shoulder pain but reports as a "pinch" then resolves, " denies injury, she denies red/warm/swollen joints, denies morning stiffness. She did have a rash to her lower back in November 2024 when labs completed but was given a topical and reports resolved, otherwise no issues/history with rashes. She denies fevers/oral/nasal ulcers/LAD or hair loss. Today on exam, no active synovitis noted.   - Positive RADHA: Reviewed outside records and records in Epic in detail. On work-up, patient was found to have a positive anti-nuclear antibody.  A positive RADHA can be seen in a variety of different diseases and syndrome.  The anti-nuclear antibody is a screening test for auto-immune disease.  Depending on the level or titer, it may be seen in autoimmune diseases such as SLE, sjogren's, scleroderma but could also be seen in auto-immune hepatitis, hypothyroidism and various other disease.   ANAs can be seen in the presence of infection, drugs (hydralazine, procainamide, INH, quinidine, dilantin, chlorpromazine, methyldopa), hepatic disease, pulmonary disease, and neoplasia (lymphoma, leukemia, melanoma, solid tumors), hematologic disease (ITP, AIHA), and endocrine disorders (Grave's, DM). It can also be seen in healthy adults.    A positive RADHA does not give one a conclusive diagnosis of SLE as it may be a false positive and may not be significant as well.  Further workup for auto-immune disease can be considered based on clinical symptoms and other laboratory findings.    Recommend the following lab tests:  -ds DNA ab  -complement levels (C3/C4)  -anti-smith, RNP  -anti-SSA/SSB  -anti-SCL70 ab  -check UA, urine protein/creatinine to evaluate for renal involvement  - Positive RADHA (antinuclear antibody): RADHA positive 1:320, speckled. Currently does not have any signs or symptoms of lupus. Likely false positive RADHA. I will check EKTA's, complements, UA and UPC to complete work up. Will call with results, if EKTA's are abnormal I will see her  back. If EKTA's are negative, no need to follow up  with me.   - Labs today as mentioned, offered bilateral shoulder xrays today but reports does not bother her that often and defers    2. Hypertension/DONAHUE/Heart Murmur   - Currently stable, followed by PCP, enc low Na+ diet, reports SOB occ, not daily, denies CP  - 07/17/2024 ECHO: LVEF 69%, PASP 37 mm Hg- mild pulmonary HTN noted- has upcoming f/u with Cardiology in June 2025- continue to follow up with PCP until apt with Cardio  - 11/22/2024 chest x-ray: Impression: No acute chest disease is identified.    3. Renal mass  - Followed by Urology     4. Gout, unspecified cause, unspecified chronicity, unspecified site  - Uric Acid November 2024 noted 9- dose has been increased- now taking Allopurinol 300 mg- daughter reports had not been taking as directed- dose adjustments with PCP last month- admits has been taking as directed- continue to follow up with PCP- no inflammatory joint pain noted on exam    5. Callus   - Callus noted to plantar aspect of left foot- daughter reports has had removed previously- possible plantar wart as small black dots also noted in callus- daughter states she will call to schedule f/u with previous foot doctor for follow up     Follow up in about 6 months (around 10/10/2025) for NP Follow Up. In addition to their scheduled follow up, the patient has also been instructed to follow up on as needed basis.     Total time spent with patient and documentation is 60 minutes. All questions were answered to patient's satisfaction and patient verbalized understanding. This includes face to face time and non-face to face time preparing to see the patient (eg, review of tests), obtaining and/or reviewing separately obtained history, documenting clinical information in the electronic or other health record, independently interpreting results and communicating results to the patient/family/caregiver, or care coordinator.       Orders Placed This Encounter   Procedures    Urinalysis     Standing Status:    Future     Expected Date:   4/10/2025     Expiration Date:   10/10/2025     Collection Type:   Urine, Clean Catch    Protein/Creatinine Ratio, Urine     Standing Status:   Future     Expected Date:   4/10/2025     Expiration Date:   10/10/2025     Specimen Source:   Urine    C-Reactive Protein     Standing Status:   Future     Expected Date:   4/10/2025     Expiration Date:   10/10/2025    Sedimentation rate     Standing Status:   Future     Expected Date:   4/10/2025     Expiration Date:   10/10/2025    Comprehensive Metabolic Panel     Standing Status:   Future     Expected Date:   4/10/2025     Expiration Date:   10/10/2025    CBC Auto Differential     Standing Status:   Future     Expected Date:   4/10/2025     Expiration Date:   10/10/2025    C4 Complement     Standing Status:   Future     Expected Date:   4/10/2025     Expiration Date:   10/10/2025    C3 Complement     Standing Status:   Future     Expected Date:   4/10/2025     Expiration Date:   10/10/2025    Anti-Scleroderma Antibody     Standing Status:   Future     Expected Date:   4/10/2025     Expiration Date:   6/9/2026    Anti-Centromere Antibody     Standing Status:   Future     Expected Date:   4/10/2025     Expiration Date:   6/9/2026    Thyroid Peroxidase Antibody     Standing Status:   Future     Expected Date:   4/10/2025     Expiration Date:   6/9/2026    Anti-Thyroglobulin Antibody     Standing Status:   Future     Expected Date:   4/10/2025     Expiration Date:   6/9/2026    Miscellaneous Test, Sendout RADHA by IFA, send to [facility]     [id number]     Standing Status:   Future     Expected Date:   4/10/2025     Expiration Date:   6/10/2026     What is the name of the test you wish to order? (Note: Please provide the reference lab test code if possible. If you have any questions, call the Lab.):   RADHA by IFA, send to [facility]    Miscellaneous Test, Sendout dsDNA by Becky, send to [facility].     [id number]     Standing Status:   Future     Expected Date:    4/10/2025     Expiration Date:   6/10/2026     What is the name of the test you wish to order? (Note: Please provide the reference lab test code if possible. If you have any questions, call the Lab.):   dsDNA by Crithedia, send to [facility].    Miscellaneous Test, Sendout EKTA panel, send to [facility], Sm/RNP (or U1 RNP), SSA-52 (Ro52), SSA-60 (Ro60), SSB antibodies. [id number]     Standing Status:   Future     Expected Date:   4/10/2025     Expiration Date:   6/10/2026     What is the name of the test you wish to order? (Note: Please provide the reference lab test code if possible. If you have any questions, call the Lab.):   [facility]TA panel, [facility], Sendout Anti CCP, please send to [facility] for quantitative result.     Standing Status:   Future     Expected Date:   4/10/2025     Expiration Date:   6/10/2026     What is the name of the test you wish to order? (Note: Please provide the reference lab test code if possible. If you have any questions, call the Lab.):   Anti CCP, please send to [facility] for quantitative result.    Rheumatoid Factor IgA     Standing Status:   Future     Expected Date:   4/10/2025     Expiration Date:   7/9/2026    Rheumatoid Factor IgM     Standing Status:   Future     Expected Date:   4/10/2025     Expiration Date:   7/9/2026    Rheumatoid Quantitative     Standing Status:   Future     Expected Date:   4/10/2025     Expiration Date:   7/9/2026        NHUNG Baxter                   [1]   Social History  Socioeconomic History    Marital status:     Number of children: 10   Occupational History    Occupation: Retired   Tobacco Use    Smoking status: Never    Smokeless tobacco: Never   Substance and Sexual Activity    Alcohol use: Never    Drug use: Never   Social History Narrative    ** Merged History Encounter **          Social Drivers of Health     Financial Resource Strain: Low Risk  (3/24/2025)    Overall Financial Resource Strain (CARDIA)      Difficulty of Paying Living Expenses: Not very hard   Food Insecurity: No Food Insecurity (3/24/2025)    Hunger Vital Sign     Worried About Running Out of Food in the Last Year: Never true     Ran Out of Food in the Last Year: Never true   Transportation Needs: No Transportation Needs (3/24/2025)    PRAPARE - Transportation     Lack of Transportation (Medical): No     Lack of Transportation (Non-Medical): No   Physical Activity: Inactive (11/20/2024)    Exercise Vital Sign     Days of Exercise per Week: 2 days     Minutes of Exercise per Session: 0 min   Stress: No Stress Concern Present (3/24/2025)    [employer] - Occupational Stress Questionnaire     Feeling of Stress : Not at all   Housing Stability: Low Risk  (3/24/2025)    Housing Stability Vital Sign     Unable to Pay for Housing in the Last Year: No     Homeless in the Last Year: No

## 2025-04-10 ENCOUNTER — OFFICE VISIT (OUTPATIENT)
Dept: RHEUMATOLOGY | Facility: CLINIC | Age: 89
End: 2025-04-10
Payer: MEDICARE

## 2025-04-10 ENCOUNTER — LAB VISIT (OUTPATIENT)
Dept: LAB | Facility: HOSPITAL | Age: 89
End: 2025-04-10
Attending: NURSE PRACTITIONER
Payer: MEDICARE

## 2025-04-10 VITALS
RESPIRATION RATE: 18 BRPM | OXYGEN SATURATION: 97 % | TEMPERATURE: 98 F | WEIGHT: 199 LBS | HEIGHT: 64 IN | SYSTOLIC BLOOD PRESSURE: 116 MMHG | DIASTOLIC BLOOD PRESSURE: 68 MMHG | HEART RATE: 81 BPM | BODY MASS INDEX: 33.97 KG/M2

## 2025-04-10 DIAGNOSIS — R76.8 POSITIVE ANA (ANTINUCLEAR ANTIBODY): Primary | ICD-10-CM

## 2025-04-10 DIAGNOSIS — L84 CALLUS OF FOOT: ICD-10-CM

## 2025-04-10 DIAGNOSIS — R76.8 POSITIVE ANA (ANTINUCLEAR ANTIBODY): ICD-10-CM

## 2025-04-10 DIAGNOSIS — I10 HYPERTENSION, UNSPECIFIED TYPE: ICD-10-CM

## 2025-04-10 DIAGNOSIS — N28.89 RENAL MASS: ICD-10-CM

## 2025-04-10 DIAGNOSIS — M10.9 GOUT, UNSPECIFIED CAUSE, UNSPECIFIED CHRONICITY, UNSPECIFIED SITE: ICD-10-CM

## 2025-04-10 DIAGNOSIS — R01.1 HEART MURMUR: ICD-10-CM

## 2025-04-10 LAB
ALBUMIN SERPL-MCNC: 3.8 G/DL (ref 3.4–4.8)
ALBUMIN/GLOB SERPL: 0.9 RATIO (ref 1.1–2)
ALP SERPL-CCNC: 71 UNIT/L (ref 40–150)
ALT SERPL-CCNC: 11 UNIT/L (ref 0–55)
ANION GAP SERPL CALC-SCNC: 6 MEQ/L
AST SERPL-CCNC: 17 UNIT/L (ref 11–45)
BACTERIA #/AREA URNS AUTO: ABNORMAL /HPF
BASOPHILS # BLD AUTO: 0.06 X10(3)/MCL
BASOPHILS NFR BLD AUTO: 0.7 %
BILIRUB SERPL-MCNC: 0.8 MG/DL
BILIRUB UR QL STRIP.AUTO: NEGATIVE
BUN SERPL-MCNC: 24.5 MG/DL (ref 9.8–20.1)
C3 SERPL-MCNC: 120 MG/DL (ref 80–173)
C4 SERPL-MCNC: 33 MG/DL (ref 13–46)
CALCIUM SERPL-MCNC: 9.8 MG/DL (ref 8.4–10.2)
CHLORIDE SERPL-SCNC: 107 MMOL/L (ref 98–107)
CLARITY UR: CLEAR
CO2 SERPL-SCNC: 29 MMOL/L (ref 23–31)
COLOR UR AUTO: ABNORMAL
CREAT SERPL-MCNC: 1.17 MG/DL (ref 0.55–1.02)
CREAT UR-MCNC: 171.6 MG/DL (ref 45–106)
CREAT/UREA NIT SERPL: 21
CRP SERPL-MCNC: 5 MG/L
EOSINOPHIL # BLD AUTO: 1.03 X10(3)/MCL (ref 0–0.9)
EOSINOPHIL NFR BLD AUTO: 12.7 %
ERYTHROCYTE [DISTWIDTH] IN BLOOD BY AUTOMATED COUNT: 14 % (ref 11.5–17)
ERYTHROCYTE [SEDIMENTATION RATE] IN BLOOD: 28 MM/HR (ref 0–20)
GFR SERPLBLD CREATININE-BSD FMLA CKD-EPI: 45 ML/MIN/1.73/M2
GLOBULIN SER-MCNC: 4.1 GM/DL (ref 2.4–3.5)
GLUCOSE SERPL-MCNC: 92 MG/DL (ref 82–115)
GLUCOSE UR QL STRIP: NORMAL
HCT VFR BLD AUTO: 33 % (ref 37–47)
HGB BLD-MCNC: 10.2 G/DL (ref 12–16)
HGB UR QL STRIP: ABNORMAL
HYALINE CASTS #/AREA URNS LPF: ABNORMAL /LPF
IMM GRANULOCYTES # BLD AUTO: 0.03 X10(3)/MCL (ref 0–0.04)
IMM GRANULOCYTES NFR BLD AUTO: 0.4 %
KETONES UR QL STRIP: NEGATIVE
LEUKOCYTE ESTERASE UR QL STRIP: NEGATIVE
LYMPHOCYTES # BLD AUTO: 2.18 X10(3)/MCL (ref 0.6–4.6)
LYMPHOCYTES NFR BLD AUTO: 26.9 %
MCH RBC QN AUTO: 28.1 PG (ref 27–31)
MCHC RBC AUTO-ENTMCNC: 30.9 G/DL (ref 33–36)
MCV RBC AUTO: 90.9 FL (ref 80–94)
MONOCYTES # BLD AUTO: 0.58 X10(3)/MCL (ref 0.1–1.3)
MONOCYTES NFR BLD AUTO: 7.2 %
MUCOUS THREADS URNS QL MICRO: ABNORMAL /LPF
NEUTROPHILS # BLD AUTO: 4.22 X10(3)/MCL (ref 2.1–9.2)
NEUTROPHILS NFR BLD AUTO: 52.1 %
NITRITE UR QL STRIP: NEGATIVE
NRBC BLD AUTO-RTO: 0 %
PH UR STRIP: 7 [PH]
PLATELET # BLD AUTO: 376 X10(3)/MCL (ref 130–400)
PMV BLD AUTO: 9.4 FL (ref 7.4–10.4)
POTASSIUM SERPL-SCNC: 3.6 MMOL/L (ref 3.5–5.1)
PROT SERPL-MCNC: 7.9 GM/DL (ref 5.8–7.6)
PROT UR QL STRIP: NEGATIVE
PROT UR STRIP-MCNC: 13.5 MG/DL
RBC # BLD AUTO: 3.63 X10(6)/MCL (ref 4.2–5.4)
RBC #/AREA URNS AUTO: ABNORMAL /HPF
RHEUMATOID FACT SERPL-ACNC: <13 IU/ML
SODIUM SERPL-SCNC: 142 MMOL/L (ref 136–145)
SP GR UR STRIP.AUTO: 1.01 (ref 1–1.03)
SQUAMOUS #/AREA URNS LPF: ABNORMAL /HPF
URINE PROTEIN/CREATININE RATIO (OLG): 0.1
UROBILINOGEN UR STRIP-ACNC: NORMAL
WBC # BLD AUTO: 8.1 X10(3)/MCL (ref 4.5–11.5)
WBC #/AREA URNS AUTO: ABNORMAL /HPF

## 2025-04-10 PROCEDURE — 36415 COLL VENOUS BLD VENIPUNCTURE: CPT

## 2025-04-10 PROCEDURE — 86376 MICROSOMAL ANTIBODY EACH: CPT

## 2025-04-10 PROCEDURE — 86200 CCP ANTIBODY: CPT

## 2025-04-10 PROCEDURE — 99215 OFFICE O/P EST HI 40 MIN: CPT | Mod: PBBFAC | Performed by: NURSE PRACTITIONER

## 2025-04-10 PROCEDURE — 1160F RVW MEDS BY RX/DR IN RCRD: CPT | Mod: CPTII,,, | Performed by: NURSE PRACTITIONER

## 2025-04-10 PROCEDURE — 85025 COMPLETE CBC W/AUTO DIFF WBC: CPT

## 2025-04-10 PROCEDURE — 86235 NUCLEAR ANTIGEN ANTIBODY: CPT

## 2025-04-10 PROCEDURE — 1159F MED LIST DOCD IN RCRD: CPT | Mod: CPTII,,, | Performed by: NURSE PRACTITIONER

## 2025-04-10 PROCEDURE — 86800 THYROGLOBULIN ANTIBODY: CPT

## 2025-04-10 PROCEDURE — 99205 OFFICE O/P NEW HI 60 MIN: CPT | Mod: S$PBB,,, | Performed by: NURSE PRACTITIONER

## 2025-04-10 PROCEDURE — 86431 RHEUMATOID FACTOR QUANT: CPT | Mod: 59

## 2025-04-10 PROCEDURE — 1101F PT FALLS ASSESS-DOCD LE1/YR: CPT | Mod: CPTII,,, | Performed by: NURSE PRACTITIONER

## 2025-04-10 PROCEDURE — 85652 RBC SED RATE AUTOMATED: CPT

## 2025-04-10 PROCEDURE — 84156 ASSAY OF PROTEIN URINE: CPT

## 2025-04-10 PROCEDURE — 81015 MICROSCOPIC EXAM OF URINE: CPT

## 2025-04-10 PROCEDURE — 86160 COMPLEMENT ANTIGEN: CPT | Mod: 59

## 2025-04-10 PROCEDURE — 3288F FALL RISK ASSESSMENT DOCD: CPT | Mod: CPTII,,, | Performed by: NURSE PRACTITIONER

## 2025-04-10 PROCEDURE — 80053 COMPREHEN METABOLIC PANEL: CPT

## 2025-04-10 PROCEDURE — 86431 RHEUMATOID FACTOR QUANT: CPT

## 2025-04-10 PROCEDURE — 86160 COMPLEMENT ANTIGEN: CPT

## 2025-04-10 PROCEDURE — 83516 IMMUNOASSAY NONANTIBODY: CPT

## 2025-04-10 PROCEDURE — 86140 C-REACTIVE PROTEIN: CPT

## 2025-04-10 PROCEDURE — 1125F AMNT PAIN NOTED PAIN PRSNT: CPT | Mod: CPTII,,, | Performed by: NURSE PRACTITIONER

## 2025-04-11 LAB
CENTROMERE IGG SER-ACNC: <0.2 U
MAYO GENERIC ORDERABLE RESULT: NORMAL
THYROGLOB AB SERPL IA-ACNC: <1.8 IU/ML
THYROPEROXIDASE AB SERPL-ACNC: 88.6 IU/ML

## 2025-04-12 LAB — ENA SCL70 IGG SER IA-ACNC: 5 AU/ML

## 2025-04-14 LAB
RHEUMATOID FACTOR IGA QUANTITATIVE (OLG): 4 IU/ML
RHEUMATOID FACTOR IGM QUANTITATIVE (OLG): 1 IU/ML

## 2025-04-17 ENCOUNTER — RESULTS FOLLOW-UP (OUTPATIENT)
Dept: RHEUMATOLOGY | Facility: CLINIC | Age: 89
End: 2025-04-17
Payer: MEDICARE

## 2025-04-17 LAB
BEAKER SEE SCANNED REPORT: NORMAL

## 2025-05-08 NOTE — TELEPHONE ENCOUNTER
Communicated results from Provider Berenice. Patient dgramon Deneen verbalized full understanding with no questions voiced upon inquiry.

## 2025-06-03 ENCOUNTER — OFFICE VISIT (OUTPATIENT)
Dept: CARDIOLOGY | Facility: CLINIC | Age: 89
End: 2025-06-03
Payer: MEDICARE

## 2025-06-03 VITALS
TEMPERATURE: 98 F | RESPIRATION RATE: 20 BRPM | SYSTOLIC BLOOD PRESSURE: 142 MMHG | WEIGHT: 198.81 LBS | DIASTOLIC BLOOD PRESSURE: 60 MMHG | HEIGHT: 64 IN | HEART RATE: 65 BPM | BODY MASS INDEX: 33.94 KG/M2 | OXYGEN SATURATION: 100 %

## 2025-06-03 DIAGNOSIS — G47.19 EXCESSIVE DAYTIME SLEEPINESS: ICD-10-CM

## 2025-06-03 DIAGNOSIS — R60.0 LOWER EXTREMITY EDEMA: ICD-10-CM

## 2025-06-03 DIAGNOSIS — I10 HYPERTENSION, UNSPECIFIED TYPE: ICD-10-CM

## 2025-06-03 DIAGNOSIS — R06.09 DOE (DYSPNEA ON EXERTION): ICD-10-CM

## 2025-06-03 DIAGNOSIS — R35.1 NOCTURIA: ICD-10-CM

## 2025-06-03 DIAGNOSIS — E78.5 HYPERLIPIDEMIA, UNSPECIFIED HYPERLIPIDEMIA TYPE: Primary | ICD-10-CM

## 2025-06-03 PROCEDURE — 99215 OFFICE O/P EST HI 40 MIN: CPT | Mod: PBBFAC | Performed by: INTERNAL MEDICINE

## 2025-06-03 PROCEDURE — 93005 ELECTROCARDIOGRAM TRACING: CPT

## 2025-06-03 RX ORDER — FUROSEMIDE 40 MG/1
40 TABLET ORAL DAILY
Qty: 30 TABLET | Refills: 11 | Status: SHIPPED | OUTPATIENT
Start: 2025-06-03 | End: 2026-06-03

## 2025-06-03 RX ORDER — ASPIRIN 325 MG
325 TABLET, DELAYED RELEASE (ENTERIC COATED) ORAL NIGHTLY
COMMUNITY

## 2025-06-04 LAB
OHS QRS DURATION: 98 MS
OHS QTC CALCULATION: 382 MS

## 2025-07-30 RX ORDER — TRIAMTERENE AND HYDROCHLOROTHIAZIDE 37.5; 25 MG/1; MG/1
1 CAPSULE ORAL DAILY
Qty: 90 CAPSULE | Refills: 1 | Status: SHIPPED | OUTPATIENT
Start: 2025-07-30 | End: 2026-01-26

## 2025-07-30 NOTE — TELEPHONE ENCOUNTER
Pt K levels are stable. Pt has been controlled/stable on these meds for over a year, and has routine lab work done. Will continue meds.